# Patient Record
Sex: FEMALE | Race: WHITE | NOT HISPANIC OR LATINO | Employment: UNEMPLOYED | URBAN - METROPOLITAN AREA
[De-identification: names, ages, dates, MRNs, and addresses within clinical notes are randomized per-mention and may not be internally consistent; named-entity substitution may affect disease eponyms.]

---

## 2019-07-12 ENCOUNTER — OFFICE VISIT (OUTPATIENT)
Dept: ORTHOPEDIC SURGERY | Facility: CLINIC | Age: 54
End: 2019-07-12

## 2019-07-12 VITALS — WEIGHT: 187.83 LBS | HEART RATE: 86 BPM | BODY MASS INDEX: 34.57 KG/M2 | OXYGEN SATURATION: 96 % | HEIGHT: 62 IN

## 2019-07-12 DIAGNOSIS — M76.62 TENDONITIS, ACHILLES, LEFT: ICD-10-CM

## 2019-07-12 DIAGNOSIS — M76.61 RIGHT ACHILLES TENDINITIS: Primary | ICD-10-CM

## 2019-07-12 PROCEDURE — 99203 OFFICE O/P NEW LOW 30 MIN: CPT | Performed by: PHYSICIAN ASSISTANT

## 2019-07-12 NOTE — PROGRESS NOTES
[facility] Note    Subjective     Patient: Asmita Sousa  : 1965    Primary Care Provider: Opal Bowie MD    Requesting Provider: As above    Pain of the Left Foot and Pain of the Right Foot      History    Chief Complaint: biLateral Achilles pain    History of Present Illness: This is a very pleasant 53-year-old female presents today with bilateral Achilles tendon pain since May 2019.  The left is more painful than the right.  She denies any trauma or injury.  She rates the pain to be 4/10 in the left and 3/10 on the right.  She denies any radiating pain swelling warmth or erythema.  She is treated with shoe inserts, rest, icing without improve pain.  Pain is worse walking downstairs.  She is here for further evaluation and treatment recommendations.    No current outpatient medications on file prior to visit.     No current facility-administered medications on file prior to visit.       No Known Allergies   History reviewed. No pertinent past medical history.  Past Surgical History:   Procedure Laterality Date   • FOOT SURGERY      Bilateral Foot Spur Remove      Family History   Problem Relation Age of Onset   • Hypertension Mother    • Cancer Mother       Social History     Socioeconomic History   • Marital status:      Spouse name: Not on file   • Number of children: Not on file   • Years of education: Not on file   • Highest education level: Not on file   Tobacco Use   • Smoking status: Never Smoker   • Smokeless tobacco: Never Used   Substance and Sexual Activity   • Alcohol use: Yes     Comment: OCC   • Drug use: No   • Sexual activity: Defer        Review of Systems   Constitutional: Positive for activity change.   HENT: Negative.    Eyes: Negative.    Respiratory: Negative.    Cardiovascular: Negative.    Gastrointestinal: Negative.    Endocrine: Negative.    Genitourinary: Negative.    Musculoskeletal: Positive for back pain and gait problem.   Skin:  "Negative.    Allergic/Immunologic: Negative.    Hematological: Negative.    Psychiatric/Behavioral: Negative.        The following portions of the patient's history were reviewed and updated as appropriate: allergies, current medications, past family history, past medical history, past social history, past surgical history and problem list.      Objective      Physical Exam  Pulse 86   Ht 158 cm (62.21\")   Wt 85.2 kg (187 lb 13.3 oz)   SpO2 96%   Breastfeeding? No   BMI 34.13 kg/m²     Body mass index is 34.13 kg/m².    GENERAL: Body habitus: obese    Lower extremity edema: Left: trace; Right: trace    Varicose veins:  Left: none; Right: none    Gait: antalgic     Mental Status:  awake and alert; oriented to person, place, and time    Voice:  clear  SKIN:  Normal    Hair Growth:  Right:normal; Left:  normal  HEENT: Head: Normocephalic, atraumatic,  without obvious abnormality.  eye: normal external eye, no icterus   PULM:  Repiratory effort normal    Ortho Exam  V:  Dorsalis Pedis:  Right: 2+; Left:2+    Posterior Tibial: Right:2+; Left:2+    Capillary Refill:  Brisk  MSK:      Tibia:  Right:  non tender; Left:  non tender      Ankle:  Right: tender Over the insertion of the Achilles tendon, ROM  normal and motor function  normal; Left:  tender Over the insertion of the Achilles tendon, ROM  normal and motor function  normal      Foot:  Right:  non tender; Left:  non tender      NEURO: Heel Walking:  Right:  normal; Left:  normal    Toe Walking:  Right:  normal; Left:  normal     [place]-Wero 5.07 monofilament test: not evaluated    Lower extremity sensation: intact     Calf Atrophy:none    Motor Function: all 5/5          Medical Decision Making    Data Review:   none    Assessment:  1. Right Achilles tendinitis    2. Tendonitis, Achilles, left        Plan:  Insertional Achilles tendonitis, retrocalcaneal bursitis.  I have explained the problem to the patient in detail.  It is generally thought to be an " overuse syndrome or due to chronic aging change.  I explained that it is NOT due to a “spur”.  The osteophyte (“spur”) often visible on x-ray is not the source of the problem: Many, many people have the same x-ray finding and do not have Achilles pain.  The problem causing the pain is the chronic tendinitis, inflammation, wear and tear of the tendon where it goes into the bone.  I explained it is very common in overweight people over 40.  I explained that if you do an MRI on everyone with this problem we will see tears and inflammation in the tendon, and likely some edema in the bone, this does not change treatment.    The bump on the heel NOT go away, the goal of the treatment is to have pain resolve.    Literature shows it is best treated with a 12 week course of physical therapy and night splinting.  I have shown the patient the 2 stretches to do at home (in addition to what PT shows them), and recommend they do them 5 reps, 6-8 times per day.  I explained that injections and orthotics will not help.  I wrote a prescription for physical therapy, and we explained where to obtain a night splint..    Follow-up in 4 months if not improved.    If not improved, I explained the next step is a short leg walking cast for 6 weeks.    Surgery is a last resort as it is only helpful in about 60% of the time in improving the pain from this problem.    Patient is going to begin physical therapy and night splinting return for casting if needed        Mira Goode PA-C  07/16/19  10:56 AM

## 2019-07-30 ENCOUNTER — TREATMENT (OUTPATIENT)
Dept: PHYSICAL THERAPY | Facility: CLINIC | Age: 54
End: 2019-07-30

## 2019-07-30 DIAGNOSIS — M76.62 ACHILLES TENDINITIS OF BOTH LOWER EXTREMITIES: Primary | ICD-10-CM

## 2019-07-30 DIAGNOSIS — M76.61 ACHILLES TENDINITIS OF BOTH LOWER EXTREMITIES: Primary | ICD-10-CM

## 2019-07-30 PROCEDURE — 97110 THERAPEUTIC EXERCISES: CPT | Performed by: PHYSICAL THERAPIST

## 2019-07-30 PROCEDURE — 97161 PT EVAL LOW COMPLEX 20 MIN: CPT | Performed by: PHYSICAL THERAPIST

## 2019-07-30 NOTE — PROGRESS NOTES
Physical Therapy Initial Evaluation and Plan of Care      Patient: Asmita Sousa   : 1965  Diagnosis/ICD-10 Code:  Achilles tendinitis of both lower extremities [M76.61, M76.62]  Referring practitioner: EARL Higuera*    Subjective Evaluation    History of Present Illness  Mechanism of injury: Patient states on May 12th she started running and felt a ripping feeling on the back of left heel and had several days where she could hardly walk. Right one started hurting as well but left one is much worse.     Difficulty walking down steps and running. Bothers her when first getting up after sitting for a while. Feels like she is limping.     Has been wearing a night splint which has helped.     PMH: bone spurs removed >10 years ago bilaterally     Pain  Current pain ratin  At best pain ratin  At worst pain ratin  Location: bilateral posterior calcaneus   Quality: burning and throbbing  Aggravating factors: ambulation, standing and stairs    Patient Goals  Patient goals for therapy: decreased edema, decreased pain, improved balance, increased motion, return to sport/leisure activities, independence with ADLs/IADLs and increased strength  Patient goal: exercise including spin, running, step aerobics            Objective       Palpation   Left   No palpable tenderness to the soleus.   Tenderness of the lateral gastrocnemius.     Right   No palpable tenderness to the soleus. Tenderness of the lateral gastrocnemius.     Tenderness   Left Ankle/Foot   Tenderness in the Achilles insertion. No tenderness in the proximal Achilles.     Right Ankle/Foot   Tenderness in the Achilles insertion. No tenderness in the proximal Achilles.     Neurological Testing     Sensation     Ankle/Foot   Left Ankle/Foot   Intact: light touch  Diminished: proprioception    Right Ankle/Foot   Intact: light touch     Active Range of Motion   Left Ankle/Foot   Dorsiflexion (ke): 13 degrees   Plantar flexion:  WFL  Inversion: 42 degrees   Eversion: 20 degrees     Right Ankle/Foot   Dorsiflexion (ke): 11 degrees   Plantar flexion: WFL  Inversion: 30 degrees   Eversion: 15 degrees     Joint Play   Left Ankle/Foot  Joints within functional limits are the subtalar joint, midfoot and forefoot.     Right Ankle/Foot  Joints within functional limits are the subtalar joint, midfoot and forefoot.     Strength/Myotome Testing     Left Hip   Normal muscle strength    Right Hip   Normal muscle strength    Left Knee   Normal strength    Right Knee   Normal strength    Left Ankle/Foot   Dorsiflexion: 4  Plantar flexion: 4  Inversion: 4  Eversion: 4    Right Ankle/Foot   Dorsiflexion: 4  Plantar flexion: 4  Inversion: 4  Eversion: 4    Tests   Left Ankle/Foot   Positive for navicular drop.   Negative for calcaneal squeeze.     Right Ankle/Foot   Negative for calcaneal squeeze.     General Comments     Ankle/Foot Comments   No pain with toe walking, mild discomfort heel walking. No pain with repeated calf raise.          Assessment & Plan     Assessment  Impairments: abnormal coordination, abnormal gait, abnormal muscle firing, abnormal muscle tone, abnormal or restricted ROM, activity intolerance, impaired balance, impaired physical strength, lacks appropriate home exercise program and pain with function  Assessment details: Patient is a 53 year old female presenting with bilateral calcaneal pain beginning in May of this year. Signs and symptoms are consistent with chronic ankle instability, possible retrocalcaneal bursitis. Cannot rule out achilles tendonitis. Patient presents with achilles insertion pain without tendon pain, navicular drop notable on left, and general instability and ankle laxity. Patient is appropriate for physical therapy to address the above issues.   Prognosis: good  Prognosis details: Short Term Goals (3 weeks):  1. Patient will be independent with home exercise program.  2. Patient will demonstrate improved ankle  strength by 50%.    Long Term Goals (8 weeks):  1. Patient will be able to run/jog at least 20 minutes with ankle pain no greater than 2/10.  2. Patient will be bale to descend full flight of steps with ankle pain no greater than 2/10.   3. Patient will be able to return to full activity with ankle pain no greater than 2/10.    Functional Limitations: walking, uncomfortable because of pain and standing  Plan  Therapy options: will be seen for skilled physical therapy services  Planned modality interventions: ultrasound, traction, thermotherapy (hydrocollator packs), TENS, iontophoresis, high voltage pulsed current (spasm management), high voltage pulsed current (pain management), cryotherapy, contrast bath immersion and fluidotherapy  Planned therapy interventions: therapeutic activities, stretching, strengthening, spinal/joint mobilization, soft tissue mobilization, postural training, neuromuscular re-education, motor coordination training, manual therapy, abdominal trunk stabilization, ADL retraining, balance/weight-bearing training, body mechanics training, fine motor coordination training, flexibility, functional ROM exercises, gait training, home exercise program, IADL retraining and joint mobilization  Frequency: 2-3x/week.  Duration in visits: 16  Treatment plan discussed with: patient        Manual Therapy:         mins  51982;  Therapeutic Exercise:    41     mins  03345;     Neuromuscular Marcel:        mins  69188;    Therapeutic Activity:          mins  69493;     Gait Training:           mins  21195;     Ultrasound:          mins  56926;    Electrical Stimulation:         mins  51431 ( );  Dry Needling          mins self-pay    Timed Treatment:   41   mins   Total Treatment:     58   mins    PT SIGNATURE: Katt Alas, LOCO   DATE TREATMENT INITIATED: 7/31/2019    Initial Certification  Certification Period: 10/29/2019  I certify that the therapy services are furnished while this patient is under my  care.  The services outlined above are required by this patient, and will be reviewed every 90 days.     PHYSICIAN: Mira Goode PA-C      DATE:     Please sign and return via fax to [phone].. Thank you, [facility].

## 2019-08-01 ENCOUNTER — TREATMENT (OUTPATIENT)
Dept: PHYSICAL THERAPY | Facility: CLINIC | Age: 54
End: 2019-08-01

## 2019-08-01 DIAGNOSIS — M76.62 ACHILLES TENDINITIS OF BOTH LOWER EXTREMITIES: Primary | ICD-10-CM

## 2019-08-01 DIAGNOSIS — M76.61 ACHILLES TENDINITIS OF BOTH LOWER EXTREMITIES: Primary | ICD-10-CM

## 2019-08-01 PROCEDURE — 97035 APP MDLTY 1+ULTRASOUND EA 15: CPT | Performed by: PHYSICAL THERAPIST

## 2019-08-01 PROCEDURE — 97110 THERAPEUTIC EXERCISES: CPT | Performed by: PHYSICAL THERAPIST

## 2019-08-02 NOTE — PROGRESS NOTES
Subjective   Asmita Sousa reports: Soreness in lateral calcaneus following exercises which improved next day.     Objective   See Exercise, Manual, and Modality Logs for complete treatment.       Assessment/Plan  Patient tolerated exercises well. Trial with US over retrocalcaneal bursa area bilaterally.   Progress per Plan of Care           Manual Therapy:         mins  40414;  Therapeutic Exercise:    43     mins  50844;     Neuromuscular Marcel:        mins  03987;    Therapeutic Activity:          mins  57542;     Gait Training:           mins  05028;     Ultrasound:     15     mins  18264;   Iontophoresis          mins  98157   Electrical Stimulation:         mins  58547 ( );  Dry Needling          mins self-pay  Fluidotherapy          mins 15096    Timed Treatment:   58   mins   Total Treatment:     58   mins    Katt Alas PT  Physical Therapist

## 2019-08-05 ENCOUNTER — TREATMENT (OUTPATIENT)
Dept: PHYSICAL THERAPY | Facility: CLINIC | Age: 54
End: 2019-08-05

## 2019-08-05 PROCEDURE — 97530 THERAPEUTIC ACTIVITIES: CPT | Performed by: PHYSICAL THERAPIST

## 2019-08-05 PROCEDURE — 97110 THERAPEUTIC EXERCISES: CPT | Performed by: PHYSICAL THERAPIST

## 2019-08-05 PROCEDURE — 97035 APP MDLTY 1+ULTRASOUND EA 15: CPT | Performed by: PHYSICAL THERAPIST

## 2019-08-05 NOTE — PROGRESS NOTES
Subjective   Asmita Sousa reports: Hasn't been as stiff in the morning as usual. Has not been doing spin class. Yoga and water aerobics felt fine. Would like to get to a point where she can run 1 min: walk 2 min for 30 mins     Objective   Mild tenderness on left posterior calcaneus with lunges.     See Exercise, Manual, and Modality Logs for complete treatment.       Assessment/Plan  Will continue to work on stability and gastroc/solus strengthening as tolerated.   Progress per Plan of Care           Manual Therapy:         mins  55410;  Therapeutic Exercise:    35     mins  35710;     Neuromuscular Marcel:        mins  48331;    Therapeutic Activity:     15     mins  82000;     Gait Training:           mins  28059;     Ultrasound:     15     mins  17106;   Iontophoresis          mins  52155   Electrical Stimulation:         mins  23950 ( );  Dry Needling          mins self-pay  Fluidotherapy          mins 79995    Timed Treatment:   65   mins   Total Treatment:     65   mins    Katt Alas, PT  Physical Therapist

## 2019-08-07 ENCOUNTER — TREATMENT (OUTPATIENT)
Dept: PHYSICAL THERAPY | Facility: CLINIC | Age: 54
End: 2019-08-07

## 2019-08-07 DIAGNOSIS — M76.61 ACHILLES TENDINITIS OF BOTH LOWER EXTREMITIES: Primary | ICD-10-CM

## 2019-08-07 DIAGNOSIS — M76.62 ACHILLES TENDINITIS OF BOTH LOWER EXTREMITIES: Primary | ICD-10-CM

## 2019-08-07 PROCEDURE — 97530 THERAPEUTIC ACTIVITIES: CPT | Performed by: PHYSICAL THERAPIST

## 2019-08-07 PROCEDURE — 97110 THERAPEUTIC EXERCISES: CPT | Performed by: PHYSICAL THERAPIST

## 2019-08-07 PROCEDURE — 97035 APP MDLTY 1+ULTRASOUND EA 15: CPT | Performed by: PHYSICAL THERAPIST

## 2019-08-07 NOTE — PROGRESS NOTES
Subjective   Asmita Sousa reports: Soreness in posterior ankle and calf.    Objective   See Exercise, Manual, and Modality Logs for complete treatment.       Assessment/Plan  Patient tolerated exercises well. Will continue working on ankle stability and decreasing irritation of achilles tendon.   Progress per Plan of Care           Manual Therapy:         mins  89434;  Therapeutic Exercise:    34     mins  35052;     Neuromuscular Marcel:        mins  44441;    Therapeutic Activity:     15     mins  91128;     Gait Training:           mins  69808;     Ultrasound:     15     mins  88328;   Iontophoresis          mins  56462   Electrical Stimulation:         mins  28458 ( );  Dry Needling          mins self-pay  Fluidotherapy          mins 92339    Timed Treatment:   64   mins   Total Treatment:     64   mins    Katt Alas PT  Physical Therapist

## 2019-08-15 ENCOUNTER — TREATMENT (OUTPATIENT)
Dept: PHYSICAL THERAPY | Facility: CLINIC | Age: 54
End: 2019-08-15

## 2019-08-15 DIAGNOSIS — M76.61 ACHILLES TENDINITIS OF BOTH LOWER EXTREMITIES: Primary | ICD-10-CM

## 2019-08-15 DIAGNOSIS — M76.62 ACHILLES TENDINITIS OF BOTH LOWER EXTREMITIES: Primary | ICD-10-CM

## 2019-08-15 PROCEDURE — 97530 THERAPEUTIC ACTIVITIES: CPT | Performed by: PHYSICAL THERAPIST

## 2019-08-15 PROCEDURE — 97110 THERAPEUTIC EXERCISES: CPT | Performed by: PHYSICAL THERAPIST

## 2019-08-15 PROCEDURE — 97140 MANUAL THERAPY 1/> REGIONS: CPT | Performed by: PHYSICAL THERAPIST

## 2019-08-15 NOTE — PROGRESS NOTES
Subjective   Asmita Merry reports: One spot on lateral left heel. Has returned to spin with no increase in discomfort. Has not had any increase in discomfort in heel with yoga poses.     Objective   Tenderness in lateral calcaneus distal to achilles insertion     See Exercise, Manual, and Modality Logs for complete treatment.       Assessment/Plan  Trial with contrast bath. Will continue ankle and calf strengthening and conditioning.   Progress per Plan of Care           Manual Therapy:    8     mins  27864;  Therapeutic Exercise:    35     mins  08924;     Neuromuscular Marcel:        mins  14760;    Therapeutic Activity:     13     mins  67454;     Gait Training:           mins  87461;     Ultrasound:          mins  60310;   Iontophoresis          mins  13741   Electrical Stimulation:         mins  54562 ( );  Dry Needling          mins self-pay  Fluidotherapy          mins 56439  Contrast bath   10 mins     Timed Treatment:   66   mins   Total Treatment:     66   mins    Katt Alas, PT  Physical Therapist

## 2019-08-19 ENCOUNTER — TREATMENT (OUTPATIENT)
Dept: PHYSICAL THERAPY | Facility: CLINIC | Age: 54
End: 2019-08-19

## 2019-08-19 DIAGNOSIS — M76.62 ACHILLES TENDINITIS OF BOTH LOWER EXTREMITIES: Primary | ICD-10-CM

## 2019-08-19 DIAGNOSIS — M76.61 ACHILLES TENDINITIS OF BOTH LOWER EXTREMITIES: Primary | ICD-10-CM

## 2019-08-19 PROCEDURE — 97116 GAIT TRAINING THERAPY: CPT | Performed by: PHYSICAL THERAPIST

## 2019-08-19 PROCEDURE — 97110 THERAPEUTIC EXERCISES: CPT | Performed by: PHYSICAL THERAPIST

## 2019-08-19 PROCEDURE — 97530 THERAPEUTIC ACTIVITIES: CPT | Performed by: PHYSICAL THERAPIST

## 2019-08-19 PROCEDURE — 97034 APP MDLTY 1+CNTRST BTH EA 15: CPT | Performed by: PHYSICAL THERAPIST

## 2019-08-19 NOTE — PROGRESS NOTES
Subjective   Asmita Sousa reports: Has had no discomfort in heel with any activity over the weekend. Felt like the contrast bath was very helpful     Objective    Normal running gait pattern.     Able to hop SL on either foot with good control. Mildly decreased control on left foot vs right.     Able to tolerate walking lunges without discomfort in heels.     See Exercise, Manual, and Modality Logs for complete treatment.       Assessment/Plan  Patient had mild discomfort in left heel with high level activity with reduced with rest. Will assess later this week for response to increased activity. Patient responded well to contrast bath and advised to perform this modality as needed following increased activity.   Anticipate DC next Visit           Manual Therapy:         mins  63245;  Therapeutic Exercise:    23     mins  98190;     Neuromuscular Marcel:        mins  27388;    Therapeutic Activity:     15     mins  33512;     Gait Training:      15     mins  60405;     Ultrasound:          mins  75604;   Iontophoresis          mins  62845   Electrical Stimulation:         mins  23442 ( );  Dry Needling          mins self-pay  Fluidotherapy         mins 38074  Contrast bath   10 mins       Timed Treatment:  63  mins   Total Treatment:     63   mins    Katt Alas PT  Physical Therapist

## 2019-08-22 ENCOUNTER — TREATMENT (OUTPATIENT)
Dept: PHYSICAL THERAPY | Facility: CLINIC | Age: 54
End: 2019-08-22

## 2019-08-22 DIAGNOSIS — M76.62 ACHILLES TENDINITIS OF BOTH LOWER EXTREMITIES: Primary | ICD-10-CM

## 2019-08-22 DIAGNOSIS — M76.61 ACHILLES TENDINITIS OF BOTH LOWER EXTREMITIES: Primary | ICD-10-CM

## 2019-08-22 PROCEDURE — 97110 THERAPEUTIC EXERCISES: CPT | Performed by: PHYSICAL THERAPIST

## 2019-08-22 PROCEDURE — 97116 GAIT TRAINING THERAPY: CPT | Performed by: PHYSICAL THERAPIST

## 2019-08-22 PROCEDURE — 97530 THERAPEUTIC ACTIVITIES: CPT | Performed by: PHYSICAL THERAPIST

## 2019-08-22 NOTE — PROGRESS NOTES
Subjective   Asmita Sousa reports: Had increased pain evening and day after run/walk on treadmill. No pain today.     Objective   Gross bilateral ankle strength: WFL    Gross bilateral ankle mobility: WFL    SLS on unstable surface >30 sec bilateral    Mild discomfort in left lateral calcaneous with walking lunges.     See Exercise, Manual, and Modality Logs for complete treatment.       Assessment/Plan  Patient demonstrates improved ankle strength and able to perform most activities without increased pain. Did have increased pain with running. Patient is interested in continuing with HEP at this time. Discussed progressive walking program. Patient educated on contrast bath and taping techniques as needed.   Other  Hold therapy for 2-3 weeks. Patient will continue with HEP and return if progress not made or regression.          Manual Therapy:         mins  85235;  Therapeutic Exercise:    25     mins  47250;     Neuromuscular Marcel:        mins  50885;    Therapeutic Activity:     14     mins  55926;     Gait Trainin     mins  98174;     Ultrasound:          mins  22347;   Iontophoresis          mins  70104   Electrical Stimulation:         mins  51048 ( );  Dry Needling          mins self-pay  Fluidotherapy          mins 87616  Contrast bath   10 mins     Timed Treatment:   62   mins   Total Treatment:     62   mins    Katt Alas, LOCO  Physical Therapist

## 2019-09-09 ENCOUNTER — OFFICE VISIT (OUTPATIENT)
Dept: FAMILY MEDICINE CLINIC | Facility: CLINIC | Age: 54
End: 2019-09-09

## 2019-09-09 VITALS
TEMPERATURE: 98.2 F | WEIGHT: 192 LBS | RESPIRATION RATE: 18 BRPM | HEART RATE: 59 BPM | BODY MASS INDEX: 35.33 KG/M2 | HEIGHT: 62 IN | OXYGEN SATURATION: 97 % | SYSTOLIC BLOOD PRESSURE: 124 MMHG | DIASTOLIC BLOOD PRESSURE: 92 MMHG

## 2019-09-09 DIAGNOSIS — E66.9 OBESITY (BMI 30.0-34.9): ICD-10-CM

## 2019-09-09 DIAGNOSIS — M79.10 MYALGIA: ICD-10-CM

## 2019-09-09 DIAGNOSIS — Z00.00 HEALTH CARE MAINTENANCE: Primary | ICD-10-CM

## 2019-09-09 LAB
25(OH)D3 SERPL-MCNC: 27.1 NG/ML (ref 30–100)
ALBUMIN SERPL-MCNC: 5 G/DL (ref 3.5–5.2)
ALBUMIN/GLOB SERPL: 1.9 G/DL
ALP SERPL-CCNC: 66 U/L (ref 39–117)
ALT SERPL W P-5'-P-CCNC: 30 U/L (ref 1–33)
ANION GAP SERPL CALCULATED.3IONS-SCNC: 14.1 MMOL/L (ref 5–15)
AST SERPL-CCNC: 30 U/L (ref 1–32)
BASOPHILS # BLD AUTO: 0.04 10*3/MM3 (ref 0–0.2)
BASOPHILS NFR BLD AUTO: 0.8 % (ref 0–1.5)
BILIRUB SERPL-MCNC: 0.5 MG/DL (ref 0.2–1.2)
BUN BLD-MCNC: 10 MG/DL (ref 6–20)
BUN/CREAT SERPL: 17.2 (ref 7–25)
CALCIUM SPEC-SCNC: 9.7 MG/DL (ref 8.6–10.5)
CHLORIDE SERPL-SCNC: 102 MMOL/L (ref 98–107)
CHOLEST SERPL-MCNC: 231 MG/DL (ref 0–200)
CHROMATIN AB SERPL-ACNC: <10 IU/ML (ref 0–14)
CK SERPL-CCNC: 299 U/L (ref 20–180)
CO2 SERPL-SCNC: 22.9 MMOL/L (ref 22–29)
CREAT BLD-MCNC: 0.58 MG/DL (ref 0.57–1)
CRP SERPL-MCNC: 0.26 MG/DL (ref 0–0.5)
DEPRECATED RDW RBC AUTO: 45.6 FL (ref 37–54)
EOSINOPHIL # BLD AUTO: 0.12 10*3/MM3 (ref 0–0.4)
EOSINOPHIL NFR BLD AUTO: 2.4 % (ref 0.3–6.2)
ERYTHROCYTE [DISTWIDTH] IN BLOOD BY AUTOMATED COUNT: 14.1 % (ref 12.3–15.4)
ERYTHROCYTE [SEDIMENTATION RATE] IN BLOOD: 5 MM/HR (ref 0–30)
GFR SERPL CREATININE-BSD FRML MDRD: 109 ML/MIN/1.73
GLOBULIN UR ELPH-MCNC: 2.6 GM/DL
GLUCOSE BLD-MCNC: 88 MG/DL (ref 65–99)
HCT VFR BLD AUTO: 45.8 % (ref 34–46.6)
HDLC SERPL-MCNC: 58 MG/DL (ref 40–60)
HGB BLD-MCNC: 14.5 G/DL (ref 12–15.9)
IMM GRANULOCYTES # BLD AUTO: 0.03 10*3/MM3 (ref 0–0.05)
IMM GRANULOCYTES NFR BLD AUTO: 0.6 % (ref 0–0.5)
IRON 24H UR-MRATE: 83 MCG/DL (ref 37–145)
LDLC SERPL CALC-MCNC: 142 MG/DL (ref 0–100)
LDLC/HDLC SERPL: 2.46 {RATIO}
LYMPHOCYTES # BLD AUTO: 1.67 10*3/MM3 (ref 0.7–3.1)
LYMPHOCYTES NFR BLD AUTO: 34.1 % (ref 19.6–45.3)
MCH RBC QN AUTO: 28.3 PG (ref 26.6–33)
MCHC RBC AUTO-ENTMCNC: 31.7 G/DL (ref 31.5–35.7)
MCV RBC AUTO: 89.5 FL (ref 79–97)
MONOCYTES # BLD AUTO: 0.31 10*3/MM3 (ref 0.1–0.9)
MONOCYTES NFR BLD AUTO: 6.3 % (ref 5–12)
NEUTROPHILS # BLD AUTO: 2.73 10*3/MM3 (ref 1.7–7)
NEUTROPHILS NFR BLD AUTO: 55.8 % (ref 42.7–76)
NRBC BLD AUTO-RTO: 0 /100 WBC (ref 0–0.2)
PLATELET # BLD AUTO: 275 10*3/MM3 (ref 140–450)
PMV BLD AUTO: 10.8 FL (ref 6–12)
POTASSIUM BLD-SCNC: 3.9 MMOL/L (ref 3.5–5.2)
PROT SERPL-MCNC: 7.6 G/DL (ref 6–8.5)
RBC # BLD AUTO: 5.12 10*6/MM3 (ref 3.77–5.28)
SODIUM BLD-SCNC: 139 MMOL/L (ref 136–145)
TRIGL SERPL-MCNC: 153 MG/DL (ref 0–150)
TSH SERPL DL<=0.05 MIU/L-ACNC: 1.75 UIU/ML (ref 0.27–4.2)
VIT B12 BLD-MCNC: <150 PG/ML (ref 211–946)
VLDLC SERPL-MCNC: 30.6 MG/DL (ref 5–40)
WBC NRBC COR # BLD: 4.9 10*3/MM3 (ref 3.4–10.8)

## 2019-09-09 PROCEDURE — 80061 LIPID PANEL: CPT | Performed by: FAMILY MEDICINE

## 2019-09-09 PROCEDURE — 86038 ANTINUCLEAR ANTIBODIES: CPT | Performed by: FAMILY MEDICINE

## 2019-09-09 PROCEDURE — 86140 C-REACTIVE PROTEIN: CPT | Performed by: FAMILY MEDICINE

## 2019-09-09 PROCEDURE — 84443 ASSAY THYROID STIM HORMONE: CPT | Performed by: FAMILY MEDICINE

## 2019-09-09 PROCEDURE — 80053 COMPREHEN METABOLIC PANEL: CPT | Performed by: FAMILY MEDICINE

## 2019-09-09 PROCEDURE — 83525 ASSAY OF INSULIN: CPT | Performed by: FAMILY MEDICINE

## 2019-09-09 PROCEDURE — 99386 PREV VISIT NEW AGE 40-64: CPT | Performed by: FAMILY MEDICINE

## 2019-09-09 PROCEDURE — 99212 OFFICE O/P EST SF 10 MIN: CPT | Performed by: FAMILY MEDICINE

## 2019-09-09 PROCEDURE — 82607 VITAMIN B-12: CPT | Performed by: FAMILY MEDICINE

## 2019-09-09 PROCEDURE — 83540 ASSAY OF IRON: CPT | Performed by: FAMILY MEDICINE

## 2019-09-09 PROCEDURE — 86431 RHEUMATOID FACTOR QUANT: CPT | Performed by: FAMILY MEDICINE

## 2019-09-09 PROCEDURE — 85652 RBC SED RATE AUTOMATED: CPT | Performed by: FAMILY MEDICINE

## 2019-09-09 PROCEDURE — 82306 VITAMIN D 25 HYDROXY: CPT | Performed by: FAMILY MEDICINE

## 2019-09-09 PROCEDURE — 85025 COMPLETE CBC W/AUTO DIFF WBC: CPT | Performed by: FAMILY MEDICINE

## 2019-09-09 PROCEDURE — 82550 ASSAY OF CK (CPK): CPT | Performed by: FAMILY MEDICINE

## 2019-09-09 NOTE — PROGRESS NOTES
"Subjective   Asmita Sousa is a 53 y.o. female.     History of Present Illness     The following portions of the patient's history were reviewed and updated as appropriate: {history reviewed:20406::\"allergies\",\"current medications\",\"past family history\",\"past medical history\",\"past social history\",\"past surgical history\",\"problem list\"}.  Vitals:    09/09/19 1000   BP: 124/92   Pulse: 59   Resp: 18   Temp: 98.2 °F (36.8 °C)   SpO2: 97%     Body mass index is 35.12 kg/m².  Review of Systems    Objective   Physical Exam        Assessment/Plan   {Assess/PlanSmartLinks:25825}           "

## 2019-09-09 NOTE — PROGRESS NOTES
"Elian Sousa is a 53 y.o. female and is here for a comprehensive physical exam. The patient reports multiple concerns.   Establish care- used to be seen at [facility]; last labs 3-4 months ago  1. Pt concerned about weight. Exercises regularly.  2. Has sleep issues and not sleeping well.   3. Has had increased muscle pain worse with exercise.    Last health maintenance visit was 1 year . Overall they feel their health is good . Lives with spouse  Occupation is unemployed. Patient's diet is in general, a \"healthy\" diet  . Has been on a vegan diet. Exercises regularly regularly 5 times weekly cardio at [facility]. Tobacco use Never used. Alcohol use is social drinker . Illicit drug no history of illicit drug use    Screening Tests  Vision Impairment. Uses Glasses/Contacts   Hearingnormal  Dental: Brushes does teeth twice a day . Dental exam every six months?yes  Mammogram up to date yes  Pap smear yes  Colonoscopy no  Dexa Scan yes    Do you take any herbs or supplements that were not prescribed by a doctor? no  Are you taking calcium supplements? no  Are you taking aspirin daily? no  Family history of ovarian cancer? no  Family history of breast cancer? Yes mother and grandmother  FH of endometrial cancer? no  FH of cervical cancer? no  FH of colon cancer? no       History:  LMP: No LMP recorded. Patient is postmenopausal.  Menopause at 51 years  Last pap date: 2019  Abnormal pap? no  : 2  Para: 2    Immunization History  Tdap? no  HPV? not applicable  Pneumonia? not applicable  Shingles? not applicable    The following portions of the patient's history were reviewed and updated as appropriate: allergies, current medications, past family history, past medical history, past social history, past surgical history and problem list.    Past Medical History:   Diagnosis Date   • Irritable bowel syndrome        Family History   Problem Relation Age of Onset   • Hypertension Mother    • Cancer Mother  "       Past Surgical History:   Procedure Laterality Date   • FOOT SURGERY      Bilateral Foot Spur Remove        Social History     Socioeconomic History   • Marital status:      Spouse name: Not on file   • Number of children: Not on file   • Years of education: Not on file   • Highest education level: Not on file   Tobacco Use   • Smoking status: Never Smoker   • Smokeless tobacco: Never Used   Substance and Sexual Activity   • Alcohol use: Yes     Comment: OCC   • Drug use: No   • Sexual activity: Defer       Review of Systems  Do you have pain that bothers you in your daily life? yes  Review of Systems   Constitutional: Negative.  Negative for activity change, fatigue, fever and unexpected weight change.   HENT: Negative.  Negative for congestion, sneezing and sore throat.    Eyes: Negative.  Negative for visual disturbance.   Respiratory: Negative.  Negative for cough, chest tightness, shortness of breath and wheezing.    Cardiovascular: Negative.  Negative for chest pain, palpitations and leg swelling.   Gastrointestinal: Negative.  Negative for abdominal distention, abdominal pain, blood in stool, constipation, diarrhea and nausea.   Endocrine: Negative.  Negative for cold intolerance and heat intolerance.   Genitourinary: Negative.  Negative for dysuria, frequency and urgency.   Musculoskeletal: Positive for myalgias. Negative for arthralgias.   Skin: Negative.  Negative for rash.   Allergic/Immunologic: Negative.    Neurological: Negative.  Negative for dizziness, syncope and numbness.   Hematological: Negative.  Negative for adenopathy.   Psychiatric/Behavioral: Positive for sleep disturbance. Negative for suicidal ideas. The patient is not nervous/anxious.        Objective   Physical Exam   Constitutional: She is oriented to person, place, and time. She appears well-developed and well-nourished. No distress.   HENT:   Right Ear: External ear normal.   Left Ear: External ear normal.   Nose: Nose  normal.   Mouth/Throat: Oropharynx is clear and moist.   Eyes: Conjunctivae and EOM are normal. Pupils are equal, round, and reactive to light.   Neck: Normal range of motion. Neck supple. No thyromegaly present.   Cardiovascular: Normal rate, regular rhythm and normal heart sounds.   No murmur heard.  Pulmonary/Chest: Effort normal and breath sounds normal. No respiratory distress. She has no wheezes.   Abdominal: Soft. Bowel sounds are normal. She exhibits no distension and no mass. There is no tenderness. There is no rebound and no guarding. No hernia.   Musculoskeletal: Normal range of motion. She exhibits no edema or tenderness.   Lymphadenopathy:     She has no cervical adenopathy.   Neurological: She is alert and oriented to person, place, and time. She has normal reflexes.   Skin: Skin is warm and dry. No rash noted. She is not diaphoretic. No erythema. No pallor.   Psychiatric: She has a normal mood and affect. Her behavior is normal. Judgment and thought content normal.   Nursing note and vitals reviewed.       Diagnoses and all orders for this visit:    Health care maintenance  -     Ambulatory Referral For Screening Colonoscopy  -     CBC & Differential  -     Comprehensive Metabolic Panel  -     Lipid Panel  -     Iron  -     TSH  -     C-reactive Protein  -     Vitamin D 25 Hydroxy  -     Vitamin B12  -     Insulin, Total  -     CBC Auto Differential    Obesity (BMI 30.0-34.9)    Myalgia  -     CK  -     SIRENA  -     Rheumatoid Factor  -     Sedimentation Rate    Other orders  -     Prasterone (INTRAROSA) 6.5 MG insert; Intrarosa 6.5 mg vaginal insert   Insert 1 vaginal insert every day by vaginal route for 28 days.         Patient Counseling:   --BMI: Discussed that it is not acceptable and appropriate                 Plan.  --Nutrition: Stressed importance of moderation in sodium/caffeine intake, saturated fat and cholesterol, caloric balance, sufficient intake of fresh fruits, vegetables, fiber,  calcium, iron, and 1 mg of folate supplement per day (for females capable of pregnancy).  ---Exercise: Stressed the importance of regular exercise.   --Substance Abuse: Discussed cessation/primary prevention of tobacco, alcohol, or other drug use; driving or other dangerous activities under the influence; availability of treatment for abuse.    --Sexuality: Discussed sexually transmitted diseases, partner selection, use of condoms, avoidance of unintended pregnancy  and contraceptive alternatives.   --Injury prevention: Discussed safety belts, safety helmets, smoke detector, smoking near bedding or upholstery.   --Dental health: Discussed importance of regular tooth brushing, flossing, and dental visits.  --Immunizations reviewed.  --Discussed benefits of mammogram  --Discussed benefits of screening colonoscopy.  --After hours service discussed with patient    Discussed the nature of the disease including, risks, complications, implications, management, safe and proper use of medications. Encouraged therapeutic lifestyle changes including healthy diet with plenty of fruits and vegetables, daily exercise, medication compliance, and keeping scheduled follow up appointments with me and any other providers. Encouraged patient to have appointment for complete physical, fasting labs, appropriate screenings, and immunizations on an annual basis.  Discussed with patient counseling on nutrition. Discussed in detail recommendations regarding decreasing animal fat and dairy and increasing fruits and vegetables. Discussed cutting refined sugars and white breads. Recommend beans, legumes, whole grains, nuts and seeds. Recommend cutting all processed foods. Given handouts from physicians committee on responsible medicine.        Follow up as needed for acute illness

## 2019-09-10 ENCOUNTER — TELEPHONE (OUTPATIENT)
Dept: FAMILY MEDICINE CLINIC | Facility: CLINIC | Age: 54
End: 2019-09-10

## 2019-09-10 LAB
ANA SER QL: NEGATIVE
INSULIN SERPL-ACNC: 17.9 UIU/ML (ref 2.6–24.9)

## 2019-09-10 NOTE — TELEPHONE ENCOUNTER
Contacted pt and advised per richar pt needs to start b12 injections monthly. Pt voiced understanding.

## 2019-09-12 ENCOUNTER — CLINICAL SUPPORT (OUTPATIENT)
Dept: FAMILY MEDICINE CLINIC | Facility: CLINIC | Age: 54
End: 2019-09-12

## 2019-09-12 DIAGNOSIS — E53.8 B12 DEFICIENCY: Primary | ICD-10-CM

## 2019-09-12 PROCEDURE — 96372 THER/PROPH/DIAG INJ SC/IM: CPT | Performed by: FAMILY MEDICINE

## 2019-09-12 RX ORDER — CYANOCOBALAMIN 1000 UG/ML
1000 INJECTION, SOLUTION INTRAMUSCULAR; SUBCUTANEOUS
Status: SHIPPED | OUTPATIENT
Start: 2019-09-12

## 2019-09-12 RX ADMIN — CYANOCOBALAMIN 1000 MCG: 1000 INJECTION, SOLUTION INTRAMUSCULAR; SUBCUTANEOUS at 11:09

## 2019-10-14 ENCOUNTER — CLINICAL SUPPORT (OUTPATIENT)
Dept: FAMILY MEDICINE CLINIC | Facility: CLINIC | Age: 54
End: 2019-10-14

## 2019-10-14 DIAGNOSIS — E53.8 VITAMIN B 12 DEFICIENCY: ICD-10-CM

## 2019-10-14 PROCEDURE — 96372 THER/PROPH/DIAG INJ SC/IM: CPT | Performed by: FAMILY MEDICINE

## 2019-10-14 RX ADMIN — CYANOCOBALAMIN 1000 MCG: 1000 INJECTION, SOLUTION INTRAMUSCULAR; SUBCUTANEOUS at 11:28

## 2019-10-16 ENCOUNTER — OFFICE VISIT (OUTPATIENT)
Dept: FAMILY MEDICINE CLINIC | Facility: CLINIC | Age: 54
End: 2019-10-16

## 2019-10-16 VITALS
WEIGHT: 187.8 LBS | DIASTOLIC BLOOD PRESSURE: 80 MMHG | SYSTOLIC BLOOD PRESSURE: 116 MMHG | TEMPERATURE: 96.8 F | HEART RATE: 94 BPM | BODY MASS INDEX: 34.56 KG/M2 | RESPIRATION RATE: 20 BRPM | HEIGHT: 62 IN | OXYGEN SATURATION: 97 %

## 2019-10-16 DIAGNOSIS — J01.00 ACUTE NON-RECURRENT MAXILLARY SINUSITIS: Primary | ICD-10-CM

## 2019-10-16 DIAGNOSIS — H65.193 ACUTE EFFUSION OF BOTH MIDDLE EARS: ICD-10-CM

## 2019-10-16 PROCEDURE — 99213 OFFICE O/P EST LOW 20 MIN: CPT | Performed by: NURSE PRACTITIONER

## 2019-10-16 PROCEDURE — 96372 THER/PROPH/DIAG INJ SC/IM: CPT | Performed by: NURSE PRACTITIONER

## 2019-10-16 RX ORDER — CEFDINIR 300 MG/1
300 CAPSULE ORAL 2 TIMES DAILY
Qty: 20 CAPSULE | Refills: 0 | Status: SHIPPED | OUTPATIENT
Start: 2019-10-16 | End: 2019-10-26

## 2019-10-16 RX ORDER — METHYLPREDNISOLONE 4 MG/1
TABLET ORAL
Qty: 1 EACH | Refills: 0 | Status: SHIPPED | OUTPATIENT
Start: 2019-10-16 | End: 2019-11-13

## 2019-10-16 RX ORDER — CEFDINIR 300 MG/1
300 CAPSULE ORAL 2 TIMES DAILY
Qty: 20 CAPSULE | Refills: 0 | Status: SHIPPED | OUTPATIENT
Start: 2019-10-16 | End: 2019-10-16 | Stop reason: SDUPTHER

## 2019-10-16 RX ORDER — METHYLPREDNISOLONE 4 MG/1
TABLET ORAL
Qty: 1 EACH | Refills: 0 | Status: SHIPPED | OUTPATIENT
Start: 2019-10-16 | End: 2019-10-16 | Stop reason: SDUPTHER

## 2019-10-16 RX ORDER — DEXAMETHASONE SODIUM PHOSPHATE 4 MG/ML
4 INJECTION, SOLUTION INTRA-ARTICULAR; INTRALESIONAL; INTRAMUSCULAR; INTRAVENOUS; SOFT TISSUE ONCE
Status: COMPLETED | OUTPATIENT
Start: 2019-10-16 | End: 2019-10-16

## 2019-10-16 RX ADMIN — DEXAMETHASONE SODIUM PHOSPHATE 4 MG: 4 INJECTION, SOLUTION INTRA-ARTICULAR; INTRALESIONAL; INTRAMUSCULAR; INTRAVENOUS; SOFT TISSUE at 16:01

## 2019-11-13 ENCOUNTER — OFFICE VISIT (OUTPATIENT)
Dept: FAMILY MEDICINE CLINIC | Facility: CLINIC | Age: 54
End: 2019-11-13

## 2019-11-13 VITALS
DIASTOLIC BLOOD PRESSURE: 78 MMHG | TEMPERATURE: 98.5 F | BODY MASS INDEX: 34.37 KG/M2 | HEART RATE: 65 BPM | RESPIRATION RATE: 18 BRPM | SYSTOLIC BLOOD PRESSURE: 118 MMHG | OXYGEN SATURATION: 98 % | HEIGHT: 62 IN | WEIGHT: 186.8 LBS

## 2019-11-13 DIAGNOSIS — E66.9 OBESITY (BMI 30-39.9): Primary | ICD-10-CM

## 2019-11-13 DIAGNOSIS — E53.8 B12 DEFICIENCY: ICD-10-CM

## 2019-11-13 PROCEDURE — 99213 OFFICE O/P EST LOW 20 MIN: CPT | Performed by: FAMILY MEDICINE

## 2019-11-13 PROCEDURE — 90471 IMMUNIZATION ADMIN: CPT | Performed by: FAMILY MEDICINE

## 2019-11-13 PROCEDURE — 96372 THER/PROPH/DIAG INJ SC/IM: CPT | Performed by: FAMILY MEDICINE

## 2019-11-13 PROCEDURE — 90674 CCIIV4 VAC NO PRSV 0.5 ML IM: CPT | Performed by: FAMILY MEDICINE

## 2019-11-13 RX ORDER — CYANOCOBALAMIN 1000 UG/ML
1000 INJECTION, SOLUTION INTRAMUSCULAR; SUBCUTANEOUS
Status: SHIPPED | OUTPATIENT
Start: 2019-11-13

## 2019-11-13 RX ADMIN — CYANOCOBALAMIN 1000 MCG: 1000 INJECTION, SOLUTION INTRAMUSCULAR; SUBCUTANEOUS at 12:27

## 2019-11-13 NOTE — PROGRESS NOTES
Subjective   Asmita Sousa is a 53 y.o. female.     History of Present Illness   Follow up labs. Continues to lose weight and exercise. Has been plant based for 1 year. Concerned that her weight loss is slow.  Due for a B12 injection today.  The following portions of the patient's history were reviewed and updated as appropriate: allergies, current medications, past family history, past medical history, past social history, past surgical history and problem list.  Vitals:    11/13/19 1140   BP: 118/78   Pulse: 65   Resp: 18   Temp: 98.5 °F (36.9 °C)   SpO2: 98%     Body mass index is 34.17 kg/m².  Review of Systems   Constitutional: Negative.  Negative for activity change, fatigue, fever, unexpected weight gain and unexpected weight loss.   HENT: Negative.  Negative for congestion, sneezing and sore throat.    Eyes: Negative.  Negative for blurred vision, double vision and visual disturbance.   Respiratory: Negative.  Negative for cough, chest tightness, shortness of breath and wheezing.    Cardiovascular: Negative.  Negative for chest pain, palpitations and leg swelling.   Gastrointestinal: Negative.  Negative for abdominal distention, abdominal pain, blood in stool, constipation, diarrhea and nausea.   Endocrine: Negative.  Negative for cold intolerance and heat intolerance.   Genitourinary: Negative.  Negative for dysuria, frequency, urgency and urinary incontinence.   Musculoskeletal: Negative.  Negative for arthralgias and myalgias.   Skin: Negative.  Negative for rash.   Allergic/Immunologic: Negative.    Neurological: Negative.  Negative for dizziness, syncope, numbness and memory problem.   Hematological: Negative.  Negative for adenopathy.   Psychiatric/Behavioral: Negative.  Negative for suicidal ideas and depressed mood. The patient is not nervous/anxious.    All other systems reviewed and are negative.      Objective   Physical Exam   Constitutional: She is oriented to person, place, and time. She  appears well-developed and well-nourished. No distress.   HENT:   Right Ear: External ear normal.   Left Ear: External ear normal.   Nose: Nose normal.   Mouth/Throat: Oropharynx is clear and moist.   Eyes: Conjunctivae and EOM are normal. Pupils are equal, round, and reactive to light.   Neck: Normal range of motion. Neck supple. No thyromegaly present.   Cardiovascular: Normal rate, regular rhythm and normal heart sounds.   No murmur heard.  Pulmonary/Chest: Effort normal and breath sounds normal. No respiratory distress. She has no wheezes.   Abdominal: Soft. Bowel sounds are normal. She exhibits no distension and no mass. There is no tenderness. There is no rebound and no guarding. No hernia.   Musculoskeletal: Normal range of motion. She exhibits no edema or tenderness.   Lymphadenopathy:     She has no cervical adenopathy.   Neurological: She is alert and oriented to person, place, and time. She has normal reflexes.   Skin: Skin is warm and dry. No rash noted. She is not diaphoretic. No erythema. No pallor.   Psychiatric: She has a normal mood and affect. Her behavior is normal. Judgment and thought content normal.   Nursing note and vitals reviewed.          Assessment/Plan   Asmita was seen today for weight check and ear fullness.    Diagnoses and all orders for this visit:    Obesity (BMI 30-39.9)    B12 deficiency  -     cyanocobalamin injection 1,000 mcg    Other orders  -     Flucelvax Quad=>4Years ([id number])

## 2019-12-13 ENCOUNTER — CLINICAL SUPPORT (OUTPATIENT)
Dept: FAMILY MEDICINE CLINIC | Facility: CLINIC | Age: 54
End: 2019-12-13

## 2019-12-13 PROCEDURE — 96372 THER/PROPH/DIAG INJ SC/IM: CPT | Performed by: FAMILY MEDICINE

## 2019-12-13 RX ADMIN — CYANOCOBALAMIN 1000 MCG: 1000 INJECTION, SOLUTION INTRAMUSCULAR; SUBCUTANEOUS at 14:21

## 2020-01-07 ENCOUNTER — TELEPHONE (OUTPATIENT)
Dept: FAMILY MEDICINE CLINIC | Facility: CLINIC | Age: 55
End: 2020-01-07

## 2020-01-07 NOTE — TELEPHONE ENCOUNTER
Patient is calling wanting to figure out when she last had her B-12 shot, so she doesn't come in to early. Please advise and call back  Patient can be reached

## 2020-01-07 NOTE — TELEPHONE ENCOUNTER
Contacted pt and advised she had her last b12 injection on 12/13/19 and is due again 28 days after this date so that would be 01/10/20. Pt voiced understanding.

## 2020-01-10 ENCOUNTER — CLINICAL SUPPORT (OUTPATIENT)
Dept: FAMILY MEDICINE CLINIC | Facility: CLINIC | Age: 55
End: 2020-01-10

## 2020-01-10 PROCEDURE — 96372 THER/PROPH/DIAG INJ SC/IM: CPT | Performed by: FAMILY MEDICINE

## 2020-01-10 RX ADMIN — CYANOCOBALAMIN 1000 MCG: 1000 INJECTION, SOLUTION INTRAMUSCULAR; SUBCUTANEOUS at 12:42

## 2020-01-19 ENCOUNTER — OFFICE VISIT (OUTPATIENT)
Dept: FAMILY MEDICINE CLINIC | Facility: CLINIC | Age: 55
End: 2020-01-19

## 2020-01-19 VITALS
SYSTOLIC BLOOD PRESSURE: 128 MMHG | RESPIRATION RATE: 18 BRPM | HEIGHT: 62 IN | TEMPERATURE: 97.2 F | HEART RATE: 82 BPM | OXYGEN SATURATION: 98 % | BODY MASS INDEX: 36.44 KG/M2 | DIASTOLIC BLOOD PRESSURE: 70 MMHG | WEIGHT: 198 LBS

## 2020-01-19 DIAGNOSIS — K57.92 DIVERTICULITIS: ICD-10-CM

## 2020-01-19 DIAGNOSIS — R10.32 LEFT LOWER QUADRANT PAIN: Primary | ICD-10-CM

## 2020-01-19 LAB
ALBUMIN SERPL-MCNC: 4.4 G/DL (ref 3.5–5.2)
ALBUMIN/GLOB SERPL: 1.8 G/DL
ALP SERPL-CCNC: 57 U/L (ref 39–117)
ALT SERPL W P-5'-P-CCNC: 21 U/L (ref 1–33)
AMYLASE SERPL-CCNC: 52 U/L (ref 28–100)
ANION GAP SERPL CALCULATED.3IONS-SCNC: 13.9 MMOL/L (ref 5–15)
AST SERPL-CCNC: 20 U/L (ref 1–32)
BILIRUB SERPL-MCNC: 0.2 MG/DL (ref 0.2–1.2)
BUN BLD-MCNC: 11 MG/DL (ref 6–20)
BUN/CREAT SERPL: 19.6 (ref 7–25)
CALCIUM SPEC-SCNC: 9.2 MG/DL (ref 8.6–10.5)
CHLORIDE SERPL-SCNC: 104 MMOL/L (ref 98–107)
CO2 SERPL-SCNC: 24.1 MMOL/L (ref 22–29)
CREAT BLD-MCNC: 0.56 MG/DL (ref 0.57–1)
GFR SERPL CREATININE-BSD FRML MDRD: 113 ML/MIN/1.73
GLOBULIN UR ELPH-MCNC: 2.4 GM/DL
GLUCOSE BLD-MCNC: 119 MG/DL (ref 65–99)
HCT VFR BLDA CALC: 42.4 % (ref 38–51)
HGB BLDA-MCNC: 13.6 G/DL (ref 12–17)
LIPASE SERPL-CCNC: 42 U/L (ref 13–60)
MCH, POC: 28.7
MCHC, POC: 32.1
MCV, POC: 89.4
PLATELET # BLD AUTO: 271 10*3/MM3
PMV BLD: 7.7 FL
POTASSIUM BLD-SCNC: 4.2 MMOL/L (ref 3.5–5.2)
PROT SERPL-MCNC: 6.8 G/DL (ref 6–8.5)
RBC, POC: 4.74
RDW, POC: 13.2
SODIUM BLD-SCNC: 142 MMOL/L (ref 136–145)
WBC # BLD: 6 10*3/UL

## 2020-01-19 PROCEDURE — 36415 COLL VENOUS BLD VENIPUNCTURE: CPT | Performed by: NURSE PRACTITIONER

## 2020-01-19 PROCEDURE — 82150 ASSAY OF AMYLASE: CPT | Performed by: NURSE PRACTITIONER

## 2020-01-19 PROCEDURE — 83690 ASSAY OF LIPASE: CPT | Performed by: NURSE PRACTITIONER

## 2020-01-19 PROCEDURE — 99213 OFFICE O/P EST LOW 20 MIN: CPT | Performed by: NURSE PRACTITIONER

## 2020-01-19 PROCEDURE — 80053 COMPREHEN METABOLIC PANEL: CPT | Performed by: NURSE PRACTITIONER

## 2020-01-19 PROCEDURE — 85027 COMPLETE CBC AUTOMATED: CPT | Performed by: NURSE PRACTITIONER

## 2020-01-19 RX ORDER — METRONIDAZOLE 500 MG/1
500 TABLET ORAL 3 TIMES DAILY
Qty: 21 TABLET | Refills: 0 | Status: SHIPPED | OUTPATIENT
Start: 2020-01-19 | End: 2020-01-23

## 2020-01-19 RX ORDER — SULFAMETHOXAZOLE AND TRIMETHOPRIM 800; 160 MG/1; MG/1
1 TABLET ORAL 2 TIMES DAILY
Qty: 14 TABLET | Refills: 0 | Status: SHIPPED | OUTPATIENT
Start: 2020-01-19 | End: 2020-01-23

## 2020-01-19 NOTE — PATIENT INSTRUCTIONS
Diverticulitis    Diverticulitis is infection or inflammation of small pouches (diverticula) in the colon that form due to a condition called diverticulosis. Diverticula can trap stool (feces) and bacteria, causing infection and inflammation.  Diverticulitis may cause severe stomach pain and diarrhea. It may lead to tissue damage in the colon that causes bleeding. The diverticula may also burst (rupture) and cause infected stool to enter other areas of the abdomen.  Complications of diverticulitis can include:  · Bleeding.  · Severe infection.  · Severe pain.  · Rupture (perforation) of the colon.  · Blockage (obstruction) of the colon.  What are the causes?  This condition is caused by stool becoming trapped in the diverticula, which allows bacteria to grow in the diverticula. This leads to inflammation and infection.  What increases the risk?  You are more likely to develop this condition if:  · You have diverticulosis. The risk for diverticulosis increases if:  ? You are overweight or obese.  ? You use tobacco products.  ? You do not get enough exercise.  · You eat a diet that does not include enough fiber. High-fiber foods include fruits, vegetables, beans, nuts, and whole grains.  What are the signs or symptoms?  Symptoms of this condition may include:  · Pain and tenderness in the abdomen. The pain is normally located on the left side of the abdomen, but it may occur in other areas.  · Fever and chills.  · Bloating.  · Cramping.  · Nausea.  · Vomiting.  · Changes in bowel routines.  · Blood in your stool.  How is this diagnosed?  This condition is diagnosed based on:  · Your medical history.  · A physical exam.  · Tests to make sure there is nothing else causing your condition. These tests may include:  ? Blood tests.  ? Urine tests.  ? Imaging tests of the abdomen, including X-rays, ultrasounds, MRIs, or CT scans.  How is this treated?  Most cases of this condition are mild and can be treated at home.  Treatment may include:  · Taking over-the-counter pain medicines.  · Following a clear liquid diet.  · Taking antibiotic medicines by mouth.  · Rest.  More severe cases may need to be treated at a hospital. Treatment may include:  · Not eating or drinking.  · Taking prescription pain medicine.  · Receiving antibiotic medicines through an IV tube.  · Receiving fluids and nutrition through an IV tube.  · Surgery.  When your condition is under control, your health care provider may recommend that you have a colonoscopy. This is an exam to look at the entire large intestine. During the exam, a lubricated, bendable tube is inserted into the anus and then passed into the rectum, colon, and other parts of the large intestine. A colonoscopy can show how severe your diverticula are and whether something else may be causing your symptoms.  Follow these instructions at home:  Medicines  · Take over-the-counter and prescription medicines only as told by your health care provider. These include fiber supplements, probiotics, and stool softeners.  · If you were prescribed an antibiotic medicine, take it as told by your health care provider. Do not stop taking the antibiotic even if you start to feel better.  · Do not drive or use heavy machinery while taking prescription pain medicine.  General instructions    · Follow a full liquid diet or another diet as directed by your health care provider. After your symptoms improve, your health care provider may tell you to change your diet. He or she may recommend that you eat a diet that contains at least 25 g (25 grams) of fiber daily. Fiber makes it easier to pass stool. Healthy sources of fiber include:  ? Berries. One cup contains 4-8 grams of fiber.  ? Beans or lentils. One half cup contains 5-8 grams of fiber.  ? Green vegetables. One cup contains 4 grams of fiber.  · Exercise for at least 30 minutes, 3 times each week. You should exercise hard enough to raise your heart rate and  break a sweat.  · Keep all follow-up visits as told by your health care provider. This is important. You may need a colonoscopy.  Contact a health care provider if:  · Your pain does not improve.  · You have a hard time drinking or eating food.  · Your bowel movements do not return to normal.  Get help right away if:  · Your pain gets worse.  · Your symptoms do not get better with treatment.  · Your symptoms suddenly get worse.  · You have a fever.  · You vomit more than one time.  · You have stools that are bloody, black, or tarry.  Summary  · Diverticulitis is infection or inflammation of small pouches (diverticula) in the colon that form due to a condition called diverticulosis. Diverticula can trap stool (feces) and bacteria, causing infection and inflammation.  · You are at higher risk for this condition if you have diverticulosis and you eat a diet that does not include enough fiber.  · Most cases of this condition are mild and can be treated at home. More severe cases may need to be treated at a hospital.  · When your condition is under control, your health care provider may recommend that you have an exam called a colonoscopy. This exam can show how severe your diverticula are and whether something else may be causing your symptoms.  This information is not intended to replace advice given to you by your health care provider. Make sure you discuss any questions you have with your health care provider.  Document Released: 09/27/2006 Document Revised: 01/20/2018 Document Reviewed: 01/20/2018  [employer] Patient Education © 2019 [employer]

## 2020-01-19 NOTE — PROGRESS NOTES
"Subjective   Jessika Sousa is a 54 y.o. female.   Chief Complaint   Patient presents with   • Abdominal Pain     Pt states that it is shifting to the hips. Pt states that this has been going on for 11 days. Pt states that she had UA done at her GYN office and was good.      Abdominal Pain   This is a new problem. The current episode started 1 to 4 weeks ago. The onset quality is gradual. The problem occurs intermittently. The most recent episode lasted 11 days. The problem has been gradually worsening. The pain is located in the LLQ. The pain is at a severity of 4/10. The pain is mild. The quality of the pain is sharp. The abdominal pain does not radiate. Associated symptoms include diarrhea. Pertinent negatives include no fever, flatus, frequency, headaches, myalgias, nausea or vomiting. Associated symptoms comments: Has IBS   . The pain is aggravated by movement. The pain is relieved by certain positions. Treatments tried: ibuprofen  Her past medical history is significant for irritable bowel syndrome. There is no history of abdominal surgery, colon cancer, Crohn's disease, gallstones, GERD, pancreatitis, PUD or ulcerative colitis.    has been eating a vegan diet for 1 year. Has almonds/nuts daily.Working out at the [employer] 5 days a week   She was seen at her GYN office [facility] Tuesday reports her urine was negative for a UTI.       The following portions of the patient's history were reviewed and updated as appropriate: allergies, current medications, past family history, past medical history, past social history, past surgical history and problem list.    Review of Systems   Constitutional: Negative for fever.   Gastrointestinal: Positive for abdominal pain and diarrhea. Negative for flatus, nausea and vomiting.        \" I have IBS so diarrhea is not unusually\"  Brown color   No vomiting    Genitourinary: Negative for frequency.   Musculoskeletal: Negative for myalgias.   Neurological: Negative for " "headaches.     Past Surgical History:   Procedure Laterality Date   • FOOT SURGERY      Bilateral Foot Spur Remove      Past Medical History:   Diagnosis Date   • Irritable bowel syndrome        Objective   No Known Allergies  Visit Vitals  /70   Pulse 82   Temp 97.2 °F (36.2 °C)   Resp 18   Ht 157.5 cm (62\")   Wt 89.8 kg (198 lb)   LMP  (LMP Unknown)   SpO2 98%   BMI 36.21 kg/m²       Physical Exam   Constitutional: She is oriented to person, place, and time. Vital signs are normal. She appears well-developed and well-nourished. She is cooperative. She does not appear ill.   HENT:   Head: Normocephalic.   Eyes: Pupils are equal, round, and reactive to light.   Neck: Normal range of motion.   Cardiovascular: Normal rate, regular rhythm, S1 normal, S2 normal and normal heart sounds.   Pulmonary/Chest: Effort normal and breath sounds normal.   Abdominal: Soft. Bowel sounds are normal. She exhibits no distension and no mass. There is no hepatosplenomegaly. There is tenderness in the left lower quadrant. There is no rigidity, no rebound, no guarding and no CVA tenderness. No hernia.   Neurological: She is alert and oriented to person, place, and time.   Skin: Skin is warm, dry and intact. Capillary refill takes less than 2 seconds.   Psychiatric: She has a normal mood and affect. Her behavior is normal.   Vitals reviewed.      Assessment/Plan   Jessika was seen today for abdominal pain.    Diagnoses and all orders for this visit:    Left lower quadrant pain  -     POCT CBC  -     Lipase  -     Amylase  -     Comprehensive Metabolic Panel  -     CT Abdomen Pelvis With & Without Contrast; Future  -     XR abdomen 3 vw; Future    Diverticulitis  -     sulfamethoxazole-trimethoprim (BACTRIM DS,SEPTRA DS) 800-160 MG per tablet; Take 1 tablet by mouth 2 (Two) Times a Day for 7 days.  -     metroNIDAZOLE (FLAGYL) 500 MG tablet; Take 1 tablet by mouth 3 (Three) Times a Day for 7 days.    POCT CBC: WBC normal limits "   Will start treatment for possible diverticulitis today.  XR of abdomen  CT of abdomen and pelvis once approved.  Labs    She does not want to go to the ER today.   She is agreeable to go if she worsens or develops a fever.     Follow up with Dr. Fairbanks next week.              Patient Instructions   Diverticulitis    Diverticulitis is infection or inflammation of small pouches (diverticula) in the colon that form due to a condition called diverticulosis. Diverticula can trap stool (feces) and bacteria, causing infection and inflammation.  Diverticulitis may cause severe stomach pain and diarrhea. It may lead to tissue damage in the colon that causes bleeding. The diverticula may also burst (rupture) and cause infected stool to enter other areas of the abdomen.  Complications of diverticulitis can include:  · Bleeding.  · Severe infection.  · Severe pain.  · Rupture (perforation) of the colon.  · Blockage (obstruction) of the colon.  What are the causes?  This condition is caused by stool becoming trapped in the diverticula, which allows bacteria to grow in the diverticula. This leads to inflammation and infection.  What increases the risk?  You are more likely to develop this condition if:  · You have diverticulosis. The risk for diverticulosis increases if:  ? You are overweight or obese.  ? You use tobacco products.  ? You do not get enough exercise.  · You eat a diet that does not include enough fiber. High-fiber foods include fruits, vegetables, beans, nuts, and whole grains.  What are the signs or symptoms?  Symptoms of this condition may include:  · Pain and tenderness in the abdomen. The pain is normally located on the left side of the abdomen, but it may occur in other areas.  · Fever and chills.  · Bloating.  · Cramping.  · Nausea.  · Vomiting.  · Changes in bowel routines.  · Blood in your stool.  How is this diagnosed?  This condition is diagnosed based on:  · Your medical history.  · A physical  exam.  · Tests to make sure there is nothing else causing your condition. These tests may include:  ? Blood tests.  ? Urine tests.  ? Imaging tests of the abdomen, including X-rays, ultrasounds, MRIs, or CT scans.  How is this treated?  Most cases of this condition are mild and can be treated at home. Treatment may include:  · Taking over-the-counter pain medicines.  · Following a clear liquid diet.  · Taking antibiotic medicines by mouth.  · Rest.  More severe cases may need to be treated at a hospital. Treatment may include:  · Not eating or drinking.  · Taking prescription pain medicine.  · Receiving antibiotic medicines through an IV tube.  · Receiving fluids and nutrition through an IV tube.  · Surgery.  When your condition is under control, your health care provider may recommend that you have a colonoscopy. This is an exam to look at the entire large intestine. During the exam, a lubricated, bendable tube is inserted into the anus and then passed into the rectum, colon, and other parts of the large intestine. A colonoscopy can show how severe your diverticula are and whether something else may be causing your symptoms.  Follow these instructions at home:  Medicines  · Take over-the-counter and prescription medicines only as told by your health care provider. These include fiber supplements, probiotics, and stool softeners.  · If you were prescribed an antibiotic medicine, take it as told by your health care provider. Do not stop taking the antibiotic even if you start to feel better.  · Do not drive or use heavy machinery while taking prescription pain medicine.  General instructions    · Follow a full liquid diet or another diet as directed by your health care provider. After your symptoms improve, your health care provider may tell you to change your diet. He or she may recommend that you eat a diet that contains at least 25 g (25 grams) of fiber daily. Fiber makes it easier to pass stool. Healthy sources  of fiber include:  ? Berries. One cup contains 4-8 grams of fiber.  ? Beans or lentils. One half cup contains 5-8 grams of fiber.  ? Green vegetables. One cup contains 4 grams of fiber.  · Exercise for at least 30 minutes, 3 times each week. You should exercise hard enough to raise your heart rate and break a sweat.  · Keep all follow-up visits as told by your health care provider. This is important. You may need a colonoscopy.  Contact a health care provider if:  · Your pain does not improve.  · You have a hard time drinking or eating food.  · Your bowel movements do not return to normal.  Get help right away if:  · Your pain gets worse.  · Your symptoms do not get better with treatment.  · Your symptoms suddenly get worse.  · You have a fever.  · You vomit more than one time.  · You have stools that are bloody, black, or tarry.  Summary  · Diverticulitis is infection or inflammation of small pouches (diverticula) in the colon that form due to a condition called diverticulosis. Diverticula can trap stool (feces) and bacteria, causing infection and inflammation.  · You are at higher risk for this condition if you have diverticulosis and you eat a diet that does not include enough fiber.  · Most cases of this condition are mild and can be treated at home. More severe cases may need to be treated at a hospital.  · When your condition is under control, your health care provider may recommend that you have an exam called a colonoscopy. This exam can show how severe your diverticula are and whether something else may be causing your symptoms.  This information is not intended to replace advice given to you by your health care provider. Make sure you discuss any questions you have with your health care provider.  Document Released: 09/27/2006 Document Revised: 01/20/2018 Document Reviewed: 01/20/2018  [employer] Patient Education © 2019 [employer]        PREETI Krishnan

## 2020-01-20 ENCOUNTER — HOSPITAL ENCOUNTER (EMERGENCY)
Facility: HOSPITAL | Age: 55
Discharge: HOME | End: 2020-01-20
Attending: EMERGENCY MEDICINE | Admitting: EMERGENCY MEDICINE

## 2020-01-20 ENCOUNTER — APPOINTMENT (OUTPATIENT)
Dept: CT IMAGING | Facility: HOSPITAL | Age: 55
End: 2020-01-20

## 2020-01-20 VITALS
HEART RATE: 80 BPM | RESPIRATION RATE: 17 BRPM | TEMPERATURE: 98.8 F | DIASTOLIC BLOOD PRESSURE: 84 MMHG | OXYGEN SATURATION: 92 % | BODY MASS INDEX: 36.44 KG/M2 | HEIGHT: 62 IN | WEIGHT: 198 LBS | SYSTOLIC BLOOD PRESSURE: 129 MMHG

## 2020-01-20 DIAGNOSIS — R10.32 LEFT LOWER QUADRANT ABDOMINAL PAIN: Primary | ICD-10-CM

## 2020-01-20 LAB
ALBUMIN SERPL-MCNC: 4.5 G/DL (ref 3.5–5.2)
ALBUMIN/GLOB SERPL: 1.5 G/DL
ALP SERPL-CCNC: 62 U/L (ref 39–117)
ALT SERPL W P-5'-P-CCNC: 29 U/L (ref 1–33)
ANION GAP SERPL CALCULATED.3IONS-SCNC: 14 MMOL/L (ref 5–15)
AST SERPL-CCNC: 29 U/L (ref 1–32)
BACTERIA UR QL AUTO: ABNORMAL /HPF
BASOPHILS # BLD AUTO: 0.03 10*3/MM3 (ref 0–0.2)
BASOPHILS NFR BLD AUTO: 0.5 % (ref 0–1.5)
BILIRUB SERPL-MCNC: 0.5 MG/DL (ref 0.2–1.2)
BILIRUB UR QL STRIP: NEGATIVE
BUN BLD-MCNC: 6 MG/DL (ref 6–20)
BUN/CREAT SERPL: 9.7 (ref 7–25)
CALCIUM SPEC-SCNC: 9.4 MG/DL (ref 8.6–10.5)
CHLORIDE SERPL-SCNC: 107 MMOL/L (ref 98–107)
CLARITY UR: CLEAR
CO2 SERPL-SCNC: 22 MMOL/L (ref 22–29)
COLOR UR: YELLOW
CREAT BLD-MCNC: 0.62 MG/DL (ref 0.57–1)
D-LACTATE SERPL-SCNC: 1.6 MMOL/L (ref 0.5–2)
DEPRECATED RDW RBC AUTO: 42.6 FL (ref 37–54)
EOSINOPHIL # BLD AUTO: 0.1 10*3/MM3 (ref 0–0.4)
EOSINOPHIL NFR BLD AUTO: 1.8 % (ref 0.3–6.2)
ERYTHROCYTE [DISTWIDTH] IN BLOOD BY AUTOMATED COUNT: 13.6 % (ref 12.3–15.4)
GFR SERPL CREATININE-BSD FRML MDRD: 100 ML/MIN/1.73
GLOBULIN UR ELPH-MCNC: 3 GM/DL
GLUCOSE BLD-MCNC: 89 MG/DL (ref 65–99)
GLUCOSE UR STRIP-MCNC: NEGATIVE MG/DL
HCT VFR BLD AUTO: 43.8 % (ref 34–46.6)
HGB BLD-MCNC: 14.4 G/DL (ref 12–15.9)
HGB UR QL STRIP.AUTO: NEGATIVE
HOLD SPECIMEN: NORMAL
HOLD SPECIMEN: NORMAL
HYALINE CASTS UR QL AUTO: ABNORMAL /LPF
IMM GRANULOCYTES # BLD AUTO: 0.02 10*3/MM3 (ref 0–0.05)
IMM GRANULOCYTES NFR BLD AUTO: 0.4 % (ref 0–0.5)
KETONES UR QL STRIP: NEGATIVE
LEUKOCYTE ESTERASE UR QL STRIP.AUTO: ABNORMAL
LIPASE SERPL-CCNC: 30 U/L (ref 13–60)
LYMPHOCYTES # BLD AUTO: 1.46 10*3/MM3 (ref 0.7–3.1)
LYMPHOCYTES NFR BLD AUTO: 25.7 % (ref 19.6–45.3)
MCH RBC QN AUTO: 28.5 PG (ref 26.6–33)
MCHC RBC AUTO-ENTMCNC: 32.9 G/DL (ref 31.5–35.7)
MCV RBC AUTO: 86.6 FL (ref 79–97)
MONOCYTES # BLD AUTO: 0.38 10*3/MM3 (ref 0.1–0.9)
MONOCYTES NFR BLD AUTO: 6.7 % (ref 5–12)
NEUTROPHILS # BLD AUTO: 3.69 10*3/MM3 (ref 1.7–7)
NEUTROPHILS NFR BLD AUTO: 64.9 % (ref 42.7–76)
NITRITE UR QL STRIP: NEGATIVE
NRBC BLD AUTO-RTO: 0 /100 WBC (ref 0–0.2)
PH UR STRIP.AUTO: 5.5 [PH] (ref 5–8)
PLATELET # BLD AUTO: 275 10*3/MM3 (ref 140–450)
PMV BLD AUTO: 9.7 FL (ref 6–12)
POTASSIUM BLD-SCNC: 3.9 MMOL/L (ref 3.5–5.2)
PROT SERPL-MCNC: 7.5 G/DL (ref 6–8.5)
PROT UR QL STRIP: NEGATIVE
RBC # BLD AUTO: 5.06 10*6/MM3 (ref 3.77–5.28)
RBC # UR: ABNORMAL /HPF
REF LAB TEST METHOD: ABNORMAL
SODIUM BLD-SCNC: 143 MMOL/L (ref 136–145)
SP GR UR STRIP: 1.01 (ref 1–1.03)
SQUAMOUS #/AREA URNS HPF: ABNORMAL /HPF
UROBILINOGEN UR QL STRIP: ABNORMAL
WBC NRBC COR # BLD: 5.68 10*3/MM3 (ref 3.4–10.8)
WBC UR QL AUTO: ABNORMAL /HPF
WHOLE BLOOD HOLD SPECIMEN: NORMAL
WHOLE BLOOD HOLD SPECIMEN: NORMAL

## 2020-01-20 PROCEDURE — 83605 ASSAY OF LACTIC ACID: CPT | Performed by: EMERGENCY MEDICINE

## 2020-01-20 PROCEDURE — 25010000002 HYDROMORPHONE PER 4 MG: Performed by: EMERGENCY MEDICINE

## 2020-01-20 PROCEDURE — 83690 ASSAY OF LIPASE: CPT | Performed by: EMERGENCY MEDICINE

## 2020-01-20 PROCEDURE — 81001 URINALYSIS AUTO W/SCOPE: CPT | Performed by: EMERGENCY MEDICINE

## 2020-01-20 PROCEDURE — 85025 COMPLETE CBC W/AUTO DIFF WBC: CPT | Performed by: EMERGENCY MEDICINE

## 2020-01-20 PROCEDURE — 25010000002 ONDANSETRON PER 1 MG: Performed by: EMERGENCY MEDICINE

## 2020-01-20 PROCEDURE — 99283 EMERGENCY DEPT VISIT LOW MDM: CPT

## 2020-01-20 PROCEDURE — 96375 TX/PRO/DX INJ NEW DRUG ADDON: CPT

## 2020-01-20 PROCEDURE — 96374 THER/PROPH/DIAG INJ IV PUSH: CPT

## 2020-01-20 PROCEDURE — 74177 CT ABD & PELVIS W/CONTRAST: CPT

## 2020-01-20 PROCEDURE — 25010000002 IOPAMIDOL 61 % SOLUTION: Performed by: EMERGENCY MEDICINE

## 2020-01-20 PROCEDURE — 80053 COMPREHEN METABOLIC PANEL: CPT | Performed by: EMERGENCY MEDICINE

## 2020-01-20 RX ORDER — ONDANSETRON 4 MG/1
4 TABLET, FILM COATED ORAL EVERY 8 HOURS PRN
Qty: 15 TABLET | Refills: 0 | Status: SHIPPED | OUTPATIENT
Start: 2020-01-20 | End: 2020-03-10

## 2020-01-20 RX ORDER — DICYCLOMINE HCL 20 MG
20 TABLET ORAL EVERY 8 HOURS PRN
Qty: 20 TABLET | Refills: 0 | Status: SHIPPED | OUTPATIENT
Start: 2020-01-20 | End: 2020-03-10

## 2020-01-20 RX ORDER — ONDANSETRON 2 MG/ML
4 INJECTION INTRAMUSCULAR; INTRAVENOUS ONCE
Status: COMPLETED | OUTPATIENT
Start: 2020-01-20 | End: 2020-01-20

## 2020-01-20 RX ORDER — SODIUM CHLORIDE 0.9 % (FLUSH) 0.9 %
10 SYRINGE (ML) INJECTION AS NEEDED
Status: DISCONTINUED | OUTPATIENT
Start: 2020-01-20 | End: 2020-01-20 | Stop reason: HOSPADM

## 2020-01-20 RX ORDER — HYDROMORPHONE HYDROCHLORIDE 1 MG/ML
0.5 INJECTION, SOLUTION INTRAMUSCULAR; INTRAVENOUS; SUBCUTANEOUS ONCE
Status: COMPLETED | OUTPATIENT
Start: 2020-01-20 | End: 2020-01-20

## 2020-01-20 RX ADMIN — HYDROMORPHONE HYDROCHLORIDE 0.5 MG: 1 INJECTION, SOLUTION INTRAMUSCULAR; INTRAVENOUS; SUBCUTANEOUS at 10:50

## 2020-01-20 RX ADMIN — ONDANSETRON 4 MG: 2 INJECTION INTRAMUSCULAR; INTRAVENOUS at 10:51

## 2020-01-20 RX ADMIN — SODIUM CHLORIDE 1000 ML: 9 INJECTION, SOLUTION INTRAVENOUS at 10:49

## 2020-01-20 RX ADMIN — IOPAMIDOL 80 ML: 612 INJECTION, SOLUTION INTRAVENOUS at 11:24

## 2020-01-20 NOTE — ED PROVIDER NOTES
Subjective   54-year-old female presents for evaluation of lower abdominal pain.  She states that for the past 12 days or so she has been experiencing pain in her lower abdomen.  The pain seems to be the worst in her left lower quadrant and is gradually worsening.  She denies any urinary symptoms.  No nausea or vomiting.  No fevers.  She is having normal bowel movements.  She went to urgent care regarding her symptoms and was advised to come to the emergency department if her symptoms did not improve.  Pain is currently 7 out of 10 in severity.  The pain is worse with palpation and movement.      History provided by:  Patient  Abdominal Pain   Pain location:  LLQ  Pain radiates to:  RLQ  Pain severity:  Moderate  Onset quality:  Sudden  Duration:  12 days  Timing:  Intermittent  Progression:  Worsening  Chronicity:  New  Associated symptoms: no dysuria, no nausea and no vomiting        Review of Systems   Gastrointestinal: Positive for abdominal pain. Negative for nausea and vomiting.   Genitourinary: Negative for dysuria, frequency and urgency.   Skin: Negative for rash.   All other systems reviewed and are negative.      Past Medical History:   Diagnosis Date   • Irritable bowel syndrome        No Known Allergies    Past Surgical History:   Procedure Laterality Date   • FOOT SURGERY      Bilateral Foot Spur Remove        Family History   Problem Relation Age of Onset   • Hypertension Mother    • Cancer Mother        Social History     Socioeconomic History   • Marital status:      Spouse name: Not on file   • Number of children: Not on file   • Years of education: Not on file   • Highest education level: Not on file   Tobacco Use   • Smoking status: Never Smoker   • Smokeless tobacco: Never Used   Substance and Sexual Activity   • Alcohol use: Yes     Comment: OCC   • Drug use: No   • Sexual activity: Defer         Objective   Physical Exam   Constitutional: She is oriented to person, place, and time. She  appears well-developed and well-nourished. No distress.   Nontoxic-appearing female   HENT:   Head: Normocephalic and atraumatic.   Mouth/Throat: Oropharynx is clear and moist.   Eyes: Pupils are equal, round, and reactive to light. EOM are normal.   Neck: Normal range of motion. Neck supple.   Cardiovascular: Normal rate and normal heart sounds. Exam reveals no gallop and no friction rub.   No murmur heard.  Pulmonary/Chest: Effort normal and breath sounds normal. No respiratory distress. She has no wheezes. She has no rales.   Abdominal: Soft. Normal appearance and bowel sounds are normal. She exhibits no distension and no mass. There is tenderness. There is guarding. There is no rebound.   Focal tenderness to left lower quadrant with voluntary guarding present, no pain out of proportion to exam   Genitourinary:   Genitourinary Comments: No CVA tenderness noted   Musculoskeletal: Normal range of motion.   Neurological: She is alert and oriented to person, place, and time.   Skin: Skin is warm and dry. No rash noted. She is not diaphoretic. No erythema.   Psychiatric: She has a normal mood and affect. Judgment and thought content normal.   Nursing note and vitals reviewed.      Procedures         ED Course  ED Course as of Jan 20 1255   Mon Jan 20, 2020   1142 54-year-old female presents complaining of gradually worsening lower abdominal pain over the past 12 days, most prominently in her left lower quadrant.  On arrival to the ED, patient nontoxic-appearing.  Exam remarkable for focal left lower quadrant tenderness.  No pain out of proportion to exam.  Labs unrevealing.  We will obtain imaging and will reassess following initial interventions.    [DD]   1245 Upon reevaluation, patient improved.  Reassured and counseled regarding symptomatic management.  Given the duration of her symptoms, she was referred to gastroenterology and will follow-up within the next week.  Scripts for Bentyl and Zofran as needed.  She  will follow-up as directed.  Agreeable with plan and given appropriate strict return precautions.    [DD]   1246 Dr. Leal is at bedside updating the patient.    [SK]   1250 We discussed the potential benign etiologies of her symptoms including but not limited to her IBS.    [DD]      ED Course User Index  [DD] Mac Leal MD  [SK] Anna Marie Ba     Recent Results (from the past 24 hour(s))   Lipase    Collection Time: 01/19/20  3:26 PM   Result Value Ref Range    Lipase 42 13 - 60 U/L   Amylase    Collection Time: 01/19/20  3:26 PM   Result Value Ref Range    Amylase 52 28 - 100 U/L   Comprehensive Metabolic Panel    Collection Time: 01/19/20  3:26 PM   Result Value Ref Range    Glucose 119 (H) 65 - 99 mg/dL    BUN 11 6 - 20 mg/dL    Creatinine 0.56 (L) 0.57 - 1.00 mg/dL    Sodium 142 136 - 145 mmol/L    Potassium 4.2 3.5 - 5.2 mmol/L    Chloride 104 98 - 107 mmol/L    CO2 24.1 22.0 - 29.0 mmol/L    Calcium 9.2 8.6 - 10.5 mg/dL    Total Protein 6.8 6.0 - 8.5 g/dL    Albumin 4.40 3.50 - 5.20 g/dL    ALT (SGPT) 21 1 - 33 U/L    AST (SGOT) 20 1 - 32 U/L    Alkaline Phosphatase 57 39 - 117 U/L    Total Bilirubin 0.2 0.2 - 1.2 mg/dL    eGFR Non African Amer 113 >60 mL/min/1.73    Globulin 2.4 gm/dL    A/G Ratio 1.8 g/dL    BUN/Creatinine Ratio 19.6 7.0 - 25.0    Anion Gap 13.9 5.0 - 15.0 mmol/L   POCT CBC    Collection Time: 01/19/20  3:48 PM   Result Value Ref Range    Hematocrit 42.4 38 - 51 %    Hemoglobin 13.6 12.0 - 17.0 g/dL    RBC 4.74     WBC 6.0     MCV 89.4     MCH 28.7     MCHC 32.1     RDW-CV 13.2     Platelets 271 10*3/mm3    MPV 7.7    Urinalysis With Microscopic If Indicated (No Culture) - Urine, Clean Catch    Collection Time: 01/20/20 10:20 AM   Result Value Ref Range    Color, UA Yellow Yellow, Straw    Appearance, UA Clear Clear    pH, UA 5.5 5.0 - 8.0    Specific Gravity, UA 1.008 1.001 - 1.030    Glucose, UA Negative Negative    Ketones, UA Negative Negative    Bilirubin, UA Negative  Negative    Blood, UA Negative Negative    Protein, UA Negative Negative    Leuk Esterase, UA Small (1+) (A) Negative    Nitrite, UA Negative Negative    Urobilinogen, UA 0.2 E.U./dL 0.2 - 1.0 E.U./dL   Urinalysis, Microscopic Only - Urine, Clean Catch    Collection Time: 01/20/20 10:20 AM   Result Value Ref Range    RBC, UA 0-2 None Seen, 0-2 /HPF    WBC, UA 3-5 (A) None Seen, 0-2 /HPF    Bacteria, UA None Seen None Seen, Trace /HPF    Squamous Epithelial Cells, UA 0-2 None Seen, 0-2 /HPF    Hyaline Casts, UA None Seen 0 - 6 /LPF    Methodology Automated Microscopy    Comprehensive Metabolic Panel    Collection Time: 01/20/20 10:41 AM   Result Value Ref Range    Glucose 89 65 - 99 mg/dL    BUN 6 6 - 20 mg/dL    Creatinine 0.62 0.57 - 1.00 mg/dL    Sodium 143 136 - 145 mmol/L    Potassium 3.9 3.5 - 5.2 mmol/L    Chloride 107 98 - 107 mmol/L    CO2 22.0 22.0 - 29.0 mmol/L    Calcium 9.4 8.6 - 10.5 mg/dL    Total Protein 7.5 6.0 - 8.5 g/dL    Albumin 4.50 3.50 - 5.20 g/dL    ALT (SGPT) 29 1 - 33 U/L    AST (SGOT) 29 1 - 32 U/L    Alkaline Phosphatase 62 39 - 117 U/L    Total Bilirubin 0.5 0.2 - 1.2 mg/dL    eGFR Non African Amer 100 >60 mL/min/1.73    Globulin 3.0 gm/dL    A/G Ratio 1.5 g/dL    BUN/Creatinine Ratio 9.7 7.0 - 25.0    Anion Gap 14.0 5.0 - 15.0 mmol/L   Lipase    Collection Time: 01/20/20 10:41 AM   Result Value Ref Range    Lipase 30 13 - 60 U/L   Light Blue Top    Collection Time: 01/20/20 10:41 AM   Result Value Ref Range    Extra Tube hold for add-on    Green Top (Gel)    Collection Time: 01/20/20 10:41 AM   Result Value Ref Range    Extra Tube Hold for add-ons.    Lavender Top    Collection Time: 01/20/20 10:41 AM   Result Value Ref Range    Extra Tube hold for add-on    Gold Top - SST    Collection Time: 01/20/20 10:41 AM   Result Value Ref Range    Extra Tube Hold for add-ons.    CBC Auto Differential    Collection Time: 01/20/20 10:41 AM   Result Value Ref Range    WBC 5.68 3.40 - 10.80 10*3/mm3     RBC 5.06 3.77 - 5.28 10*6/mm3    Hemoglobin 14.4 12.0 - 15.9 g/dL    Hematocrit 43.8 34.0 - 46.6 %    MCV 86.6 79.0 - 97.0 fL    MCH 28.5 26.6 - 33.0 pg    MCHC 32.9 31.5 - 35.7 g/dL    RDW 13.6 12.3 - 15.4 %    RDW-SD 42.6 37.0 - 54.0 fl    MPV 9.7 6.0 - 12.0 fL    Platelets 275 140 - 450 10*3/mm3    Neutrophil % 64.9 42.7 - 76.0 %    Lymphocyte % 25.7 19.6 - 45.3 %    Monocyte % 6.7 5.0 - 12.0 %    Eosinophil % 1.8 0.3 - 6.2 %    Basophil % 0.5 0.0 - 1.5 %    Immature Grans % 0.4 0.0 - 0.5 %    Neutrophils, Absolute 3.69 1.70 - 7.00 10*3/mm3    Lymphocytes, Absolute 1.46 0.70 - 3.10 10*3/mm3    Monocytes, Absolute 0.38 0.10 - 0.90 10*3/mm3    Eosinophils, Absolute 0.10 0.00 - 0.40 10*3/mm3    Basophils, Absolute 0.03 0.00 - 0.20 10*3/mm3    Immature Grans, Absolute 0.02 0.00 - 0.05 10*3/mm3    nRBC 0.0 0.0 - 0.2 /100 WBC   Lactic Acid, Plasma    Collection Time: 01/20/20 10:41 AM   Result Value Ref Range    Lactate 1.6 0.5 - 2.0 mmol/L     Note: In addition to lab results from this visit, the labs listed above may include labs taken at another facility or during a different encounter within the last 24 hours. Please correlate lab times with ED admission and discharge times for further clarification of the services performed during this visit.    CT Abdomen Pelvis With Contrast   Preliminary Result   No evidence of acute inflammatory focus or other clearly   acute disease. Relatively decompressed appearance of the colon,   nonspecific, possibly reflecting history of diarrhea.                Vitals:    01/20/20 1200 01/20/20 1215 01/20/20 1230 01/20/20 1245   BP: 125/77  124/85    BP Location:       Patient Position:       Pulse:       Resp:       Temp:       TempSrc:       SpO2: 95% 92% 94% 93%   Weight:       Height:         Medications   sodium chloride 0.9 % flush 10 mL (has no administration in time range)   sodium chloride 0.9 % bolus 1,000 mL (0 mL Intravenous Stopped 1/20/20 1248)   HYDROmorphone  (DILAUDID) injection 0.5 mg (0.5 mg Intravenous Given 1/20/20 1050)   ondansetron (ZOFRAN) injection 4 mg (4 mg Intravenous Given 1/20/20 1051)   iopamidol (ISOVUE-300) 61 % injection 100 mL (80 mL Intravenous Given 1/20/20 1124)     ECG/EMG Results (last 24 hours)     ** No results found for the last 24 hours. **        No orders to display                                            Recent Results (from the past 24 hour(s))   Lipase    Collection Time: 01/19/20  3:26 PM   Result Value Ref Range    Lipase 42 13 - 60 U/L   Amylase    Collection Time: 01/19/20  3:26 PM   Result Value Ref Range    Amylase 52 28 - 100 U/L   Comprehensive Metabolic Panel    Collection Time: 01/19/20  3:26 PM   Result Value Ref Range    Glucose 119 (H) 65 - 99 mg/dL    BUN 11 6 - 20 mg/dL    Creatinine 0.56 (L) 0.57 - 1.00 mg/dL    Sodium 142 136 - 145 mmol/L    Potassium 4.2 3.5 - 5.2 mmol/L    Chloride 104 98 - 107 mmol/L    CO2 24.1 22.0 - 29.0 mmol/L    Calcium 9.2 8.6 - 10.5 mg/dL    Total Protein 6.8 6.0 - 8.5 g/dL    Albumin 4.40 3.50 - 5.20 g/dL    ALT (SGPT) 21 1 - 33 U/L    AST (SGOT) 20 1 - 32 U/L    Alkaline Phosphatase 57 39 - 117 U/L    Total Bilirubin 0.2 0.2 - 1.2 mg/dL    eGFR Non African Amer 113 >60 mL/min/1.73    Globulin 2.4 gm/dL    A/G Ratio 1.8 g/dL    BUN/Creatinine Ratio 19.6 7.0 - 25.0    Anion Gap 13.9 5.0 - 15.0 mmol/L   POCT CBC    Collection Time: 01/19/20  3:48 PM   Result Value Ref Range    Hematocrit 42.4 38 - 51 %    Hemoglobin 13.6 12.0 - 17.0 g/dL    RBC 4.74     WBC 6.0     MCV 89.4     MCH 28.7     MCHC 32.1     RDW-CV 13.2     Platelets 271 10*3/mm3    MPV 7.7    Urinalysis With Microscopic If Indicated (No Culture) - Urine, Clean Catch    Collection Time: 01/20/20 10:20 AM   Result Value Ref Range    Color, UA Yellow Yellow, Straw    Appearance, UA Clear Clear    pH, UA 5.5 5.0 - 8.0    Specific Gravity, UA 1.008 1.001 - 1.030    Glucose, UA Negative Negative    Ketones, UA Negative Negative     Bilirubin, UA Negative Negative    Blood, UA Negative Negative    Protein, UA Negative Negative    Leuk Esterase, UA Small (1+) (A) Negative    Nitrite, UA Negative Negative    Urobilinogen, UA 0.2 E.U./dL 0.2 - 1.0 E.U./dL   Urinalysis, Microscopic Only - Urine, Clean Catch    Collection Time: 01/20/20 10:20 AM   Result Value Ref Range    RBC, UA 0-2 None Seen, 0-2 /HPF    WBC, UA 3-5 (A) None Seen, 0-2 /HPF    Bacteria, UA None Seen None Seen, Trace /HPF    Squamous Epithelial Cells, UA 0-2 None Seen, 0-2 /HPF    Hyaline Casts, UA None Seen 0 - 6 /LPF    Methodology Automated Microscopy    Comprehensive Metabolic Panel    Collection Time: 01/20/20 10:41 AM   Result Value Ref Range    Glucose 89 65 - 99 mg/dL    BUN 6 6 - 20 mg/dL    Creatinine 0.62 0.57 - 1.00 mg/dL    Sodium 143 136 - 145 mmol/L    Potassium 3.9 3.5 - 5.2 mmol/L    Chloride 107 98 - 107 mmol/L    CO2 22.0 22.0 - 29.0 mmol/L    Calcium 9.4 8.6 - 10.5 mg/dL    Total Protein 7.5 6.0 - 8.5 g/dL    Albumin 4.50 3.50 - 5.20 g/dL    ALT (SGPT) 29 1 - 33 U/L    AST (SGOT) 29 1 - 32 U/L    Alkaline Phosphatase 62 39 - 117 U/L    Total Bilirubin 0.5 0.2 - 1.2 mg/dL    eGFR Non African Amer 100 >60 mL/min/1.73    Globulin 3.0 gm/dL    A/G Ratio 1.5 g/dL    BUN/Creatinine Ratio 9.7 7.0 - 25.0    Anion Gap 14.0 5.0 - 15.0 mmol/L   Lipase    Collection Time: 01/20/20 10:41 AM   Result Value Ref Range    Lipase 30 13 - 60 U/L   Light Blue Top    Collection Time: 01/20/20 10:41 AM   Result Value Ref Range    Extra Tube hold for add-on    Green Top (Gel)    Collection Time: 01/20/20 10:41 AM   Result Value Ref Range    Extra Tube Hold for add-ons.    Lavender Top    Collection Time: 01/20/20 10:41 AM   Result Value Ref Range    Extra Tube hold for add-on    Gold Top - SST    Collection Time: 01/20/20 10:41 AM   Result Value Ref Range    Extra Tube Hold for add-ons.    CBC Auto Differential    Collection Time: 01/20/20 10:41 AM   Result Value Ref Range    WBC 5.68  3.40 - 10.80 10*3/mm3    RBC 5.06 3.77 - 5.28 10*6/mm3    Hemoglobin 14.4 12.0 - 15.9 g/dL    Hematocrit 43.8 34.0 - 46.6 %    MCV 86.6 79.0 - 97.0 fL    MCH 28.5 26.6 - 33.0 pg    MCHC 32.9 31.5 - 35.7 g/dL    RDW 13.6 12.3 - 15.4 %    RDW-SD 42.6 37.0 - 54.0 fl    MPV 9.7 6.0 - 12.0 fL    Platelets 275 140 - 450 10*3/mm3    Neutrophil % 64.9 42.7 - 76.0 %    Lymphocyte % 25.7 19.6 - 45.3 %    Monocyte % 6.7 5.0 - 12.0 %    Eosinophil % 1.8 0.3 - 6.2 %    Basophil % 0.5 0.0 - 1.5 %    Immature Grans % 0.4 0.0 - 0.5 %    Neutrophils, Absolute 3.69 1.70 - 7.00 10*3/mm3    Lymphocytes, Absolute 1.46 0.70 - 3.10 10*3/mm3    Monocytes, Absolute 0.38 0.10 - 0.90 10*3/mm3    Eosinophils, Absolute 0.10 0.00 - 0.40 10*3/mm3    Basophils, Absolute 0.03 0.00 - 0.20 10*3/mm3    Immature Grans, Absolute 0.02 0.00 - 0.05 10*3/mm3    nRBC 0.0 0.0 - 0.2 /100 WBC   Lactic Acid, Plasma    Collection Time: 01/20/20 10:41 AM   Result Value Ref Range    Lactate 1.6 0.5 - 2.0 mmol/L     Note: In addition to lab results from this visit, the labs listed above may include labs taken at another facility or during a different encounter within the last 24 hours. Please correlate lab times with ED admission and discharge times for further clarification of the services performed during this visit.    CT Abdomen Pelvis With Contrast   Preliminary Result   No evidence of acute inflammatory focus or other clearly   acute disease. Relatively decompressed appearance of the colon,   nonspecific, possibly reflecting history of diarrhea.                Vitals:    01/20/20 1255 01/20/20 1259 01/20/20 1300 01/20/20 1325   BP:   129/84    BP Location:       Patient Position:       Pulse:    80   Resp:    17   Temp:       TempSrc:       SpO2: 94% 97% 92%    Weight:       Height:         Medications   sodium chloride 0.9 % flush 10 mL (has no administration in time range)   sodium chloride 0.9 % bolus 1,000 mL (0 mL Intravenous Stopped 1/20/20 1248)    HYDROmorphone (DILAUDID) injection 0.5 mg (0.5 mg Intravenous Given 1/20/20 1050)   ondansetron (ZOFRAN) injection 4 mg (4 mg Intravenous Given 1/20/20 1051)   iopamidol (ISOVUE-300) 61 % injection 100 mL (80 mL Intravenous Given 1/20/20 1124)     ECG/EMG Results (last 24 hours)     ** No results found for the last 24 hours. **        No orders to display             MDM    Final diagnoses:   Left lower quadrant abdominal pain       Documentation assistance provided by suni Ba.  Information recorded by the scrdev was done at my direction and has been verified and validated by me.     Anna Marie Ba  01/20/20 1020       Anna Marie Ba  01/20/20 1255       Mac Leal MD  01/20/20 0661

## 2020-01-20 NOTE — DISCHARGE INSTRUCTIONS
Follow up with your primary care provider as instructed.    Return immediately for new or changing concerns.    Please review the medications you are supposed to be taking according to prior physician recommendations. I have not changed your home medications during this visit. If your discharge instructions indicate that I have changed your home medications, this is not the case, and you should disregard. If you have any questions about the medication you should be taking at home, please call your physician.

## 2020-01-23 ENCOUNTER — OFFICE VISIT (OUTPATIENT)
Dept: GASTROENTEROLOGY | Facility: CLINIC | Age: 55
End: 2020-01-23

## 2020-01-23 VITALS
DIASTOLIC BLOOD PRESSURE: 90 MMHG | HEIGHT: 62 IN | OXYGEN SATURATION: 98 % | SYSTOLIC BLOOD PRESSURE: 134 MMHG | HEART RATE: 84 BPM | TEMPERATURE: 99.5 F | WEIGHT: 194 LBS | BODY MASS INDEX: 35.7 KG/M2

## 2020-01-23 DIAGNOSIS — Z83.71 FAMILY HISTORY OF POLYPS IN THE COLON: ICD-10-CM

## 2020-01-23 DIAGNOSIS — R10.32 LEFT LOWER QUADRANT ABDOMINAL PAIN: Primary | ICD-10-CM

## 2020-01-23 DIAGNOSIS — R14.0 BLOATING: ICD-10-CM

## 2020-01-23 PROCEDURE — 99244 OFF/OP CNSLTJ NEW/EST MOD 40: CPT | Performed by: INTERNAL MEDICINE

## 2020-01-23 NOTE — PROGRESS NOTES
PCP: Kajal Fairbanks DO    Chief Complaint   Patient presents with   • Abdominal Pain       History of Present Illness:   HPI  I appreciate the consult for abdominal pain.  Mrs. Sousa is a 54-year-old with a history of irritable bowel syndrome diagnosed years ago.  She states that about 2 weeks ago there was development of sharp lower abdominal pain.  The pain was intermittent in time.  She states there was an increase if she would shift positions particularly around her hips.  There is also some increase of the discomfort with sitting.  The patient denies any association of the pain with bowel movements.  There is no history of blood in the stool.  Mrs. Sousa states that she generally has 3 bowel movements daily.  The [place] score can be from 4-7.  There is no history of nocturnal diarrhea.  She has undergone previous colonoscopy about 6 years ago and no abnormalities were found.  There is no family history of colon cancer.  Her brother apparently did have a colon polyp that required surgery.  The patient denies any difficult or painful swallowing.  There is no history of breakthrough heartburn.  She denies any bloating with meals.  There is no history of unexplained weight loss.  She denies night sweats, fever or chills.  There is no family history of Crohn's disease or ulcerative colitis.  Past Medical History:   Diagnosis Date   • Irritable bowel syndrome        Past Surgical History:   Procedure Laterality Date   • FOOT SURGERY      Bilateral Foot Spur Remove          Current Outpatient Medications:   •  dicyclomine (BENTYL) 20 MG tablet, Take 1 tablet by mouth Every 8 (Eight) Hours As Needed (pain, cramps)., Disp: 20 tablet, Rfl: 0  •  ondansetron (ZOFRAN) 4 MG tablet, Take 1 tablet by mouth Every 8 (Eight) Hours As Needed for Nausea., Disp: 15 tablet, Rfl: 0  •  Prasterone (INTRAROSA) 6.5 MG insert, Intrarosa 6.5 mg vaginal insert  Insert 1 vaginal insert every day by vaginal route for 28 days., Disp: ,  Rfl:     Current Facility-Administered Medications:   •  cyanocobalamin injection 1,000 mcg, 1,000 mcg, Intramuscular, Q28 Days, Fairbanks, Kajal A, DO, 1,000 mcg at 10/14/19 1128  •  cyanocobalamin injection 1,000 mcg, 1,000 mcg, Intramuscular, Q28 Days, Fairbanks, Kajal A, DO, 1,000 mcg at 01/10/20 1242    No Known Allergies    Family History   Problem Relation Age of Onset   • Hypertension Mother    • Cancer Mother        Social History     Socioeconomic History   • Marital status:      Spouse name: Not on file   • Number of children: Not on file   • Years of education: Not on file   • Highest education level: Not on file   Tobacco Use   • Smoking status: Never Smoker   • Smokeless tobacco: Never Used   Substance and Sexual Activity   • Alcohol use: Yes     Comment: OCC   • Drug use: No   • Sexual activity: Defer       Review of Systems   Constitutional: Positive for unexpected weight change. Negative for activity change, appetite change, fatigue and fever.   HENT: Negative for dental problem, hearing loss, mouth sores, postnasal drip, sneezing, trouble swallowing and voice change.    Eyes: Negative for pain, redness, itching and visual disturbance.   Respiratory: Negative for cough, choking, chest tightness, shortness of breath and wheezing.    Cardiovascular: Negative for chest pain, palpitations and leg swelling.   Gastrointestinal: Positive for abdominal distention (bloating), abdominal pain and diarrhea. Negative for anal bleeding, blood in stool, constipation, nausea, rectal pain and vomiting.   Endocrine: Negative for cold intolerance, heat intolerance, polydipsia, polyphagia and polyuria.   Genitourinary: Negative.  Negative for dysuria, enuresis, flank pain, hematuria and urgency.   Musculoskeletal: Negative for arthralgias, back pain, gait problem, joint swelling and myalgias.   Skin: Negative for color change, pallor and rash.   Allergic/Immunologic: Negative for environmental allergies, food allergies  and immunocompromised state.   Neurological: Negative for dizziness, tremors, seizures, facial asymmetry, speech difficulty, numbness and headaches.   Hematological: Negative for adenopathy.   Psychiatric/Behavioral: Negative for behavioral problems, confusion, dysphoric mood, hallucinations and self-injury.       Vitals:    01/23/20 1017   BP: 134/90   Pulse: 84   Temp: 99.5 °F (37.5 °C)   SpO2: 98%       Physical Exam   Constitutional: She is oriented to person, place, and time. She appears well-nourished. No distress.   HENT:   Head: Atraumatic.   Mouth/Throat: Oropharynx is clear and moist. No oropharyngeal exudate.   Eyes: EOM are normal. No scleral icterus.   Neck: Neck supple. No thyromegaly present.   Cardiovascular: Normal rate, regular rhythm and normal heart sounds. Exam reveals no gallop.   No murmur heard.  Pulmonary/Chest: Effort normal and breath sounds normal. She has no wheezes. She has no rales.   Abdominal: Soft. Bowel sounds are normal. There is tenderness (left lower, suprapubic ). There is no rebound and no guarding.   Musculoskeletal: Normal range of motion. She exhibits no edema.   Lymphadenopathy:     She has no cervical adenopathy.   Neurological: She is alert and oriented to person, place, and time. She exhibits normal muscle tone.   Skin: Skin is dry. No erythema.   Psychiatric: She has a normal mood and affect. Her behavior is normal. Thought content normal.   Vitals reviewed.      Jessika was seen today for abdominal pain.    Diagnoses and all orders for this visit:    Left lower quadrant abdominal pain  -     Colonoscopy; Future    Bloating  -     Colonoscopy; Future    Family history of polyps in the colon    The patient has been diagnosed with irritable bowel syndrome in the past.  She states there is a family history of colon polyps.  Certainly, cannot exclude any mucosal abnormality and further evaluation is warranted to rule out colorectal polyps, colorectal cancer, microscopic  colitis and IBD.  There is a component that suggest possible musculoskeletal etiology.  There is no known lumbar disc disease.  She had a CT scan of the pelvis that did not demonstrate any obvious abnormality of the ovaries.      Plan: Will schedule colonoscopy for further evaluation.

## 2020-01-25 ENCOUNTER — PATIENT MESSAGE (OUTPATIENT)
Dept: GASTROENTEROLOGY | Facility: CLINIC | Age: 55
End: 2020-01-25

## 2020-01-27 RX ORDER — SODIUM, POTASSIUM,MAG SULFATES 17.5-3.13G
1 SOLUTION, RECONSTITUTED, ORAL ORAL TAKE AS DIRECTED
Qty: 2 BOTTLE | Refills: 0 | Status: SHIPPED | OUTPATIENT
Start: 2020-01-27 | End: 2020-02-05

## 2020-01-27 NOTE — TELEPHONE ENCOUNTER
"From: Jessika Sousa  To: Tay Khalil MD  Sent: 1/25/2020 10:58 AM EST  Subject: Prescription Question    Just signed into [facility]. Saw that there's nothing showing up under \"future appointments\" for this Wednesday's (Jan 29th) Colonoscopy appointment. I've also tried to find out where my prescription for SUPREP was sent. There's some confusion about where the [facility] (due to [facility]'s acquisition of [facility]). I called the office on Friday and told them to send the prescription to the [facility] site in [place], [place]. The office told me I would receive a call from a nurse Friday but I never received that callback. I called [facility] in [place] and they have no record of any prescription for me. Please advise. Asmita Sousa [phone]  "

## 2020-01-29 ENCOUNTER — LAB REQUISITION (OUTPATIENT)
Dept: LAB | Facility: HOSPITAL | Age: 55
End: 2020-01-29

## 2020-01-29 ENCOUNTER — OUTSIDE FACILITY SERVICE (OUTPATIENT)
Dept: GASTROENTEROLOGY | Facility: CLINIC | Age: 55
End: 2020-01-29

## 2020-01-29 DIAGNOSIS — R19.7 DIARRHEA, UNSPECIFIED: ICD-10-CM

## 2020-01-29 DIAGNOSIS — R10.30 LOWER ABDOMINAL PAIN, UNSPECIFIED: ICD-10-CM

## 2020-01-29 DIAGNOSIS — R10.32 LEFT LOWER QUADRANT PAIN: ICD-10-CM

## 2020-01-29 DIAGNOSIS — R14.0 ABDOMINAL DISTENSION (GASEOUS): ICD-10-CM

## 2020-01-29 PROCEDURE — 45380 COLONOSCOPY AND BIOPSY: CPT | Performed by: INTERNAL MEDICINE

## 2020-01-29 PROCEDURE — 88305 TISSUE EXAM BY PATHOLOGIST: CPT | Performed by: INTERNAL MEDICINE

## 2020-01-29 PROCEDURE — 45385 COLONOSCOPY W/LESION REMOVAL: CPT | Performed by: INTERNAL MEDICINE

## 2020-01-30 LAB
CYTO UR: NORMAL
LAB AP CASE REPORT: NORMAL
LAB AP CLINICAL INFORMATION: NORMAL
PATH REPORT.FINAL DX SPEC: NORMAL
PATH REPORT.GROSS SPEC: NORMAL

## 2020-02-03 ENCOUNTER — TELEPHONE (OUTPATIENT)
Dept: GASTROENTEROLOGY | Facility: CLINIC | Age: 55
End: 2020-02-03

## 2020-02-03 NOTE — TELEPHONE ENCOUNTER
Message   Received: 3 days ago   Message Contents   Tay Khalil MD  P M[facility]             Let Ms. Sousa know there were 3 adenoma-type polyps.  The patient will need a repeat examination in 3 years.  The biopsies were negative for microscopic colitis.  I would recommend a trial of Xifaxan for irritable bowel syndrome with diarrhea if she still is experiencing loose stool.   Thank you,   ANGELA

## 2020-02-05 ENCOUNTER — OFFICE VISIT (OUTPATIENT)
Dept: FAMILY MEDICINE CLINIC | Facility: CLINIC | Age: 55
End: 2020-02-05

## 2020-02-05 VITALS
WEIGHT: 192 LBS | OXYGEN SATURATION: 96 % | HEART RATE: 86 BPM | TEMPERATURE: 97.6 F | SYSTOLIC BLOOD PRESSURE: 118 MMHG | BODY MASS INDEX: 35.33 KG/M2 | HEIGHT: 62 IN | RESPIRATION RATE: 18 BRPM | DIASTOLIC BLOOD PRESSURE: 86 MMHG

## 2020-02-05 DIAGNOSIS — E53.8 B12 DEFICIENCY: ICD-10-CM

## 2020-02-05 DIAGNOSIS — M25.552 LEFT HIP PAIN: ICD-10-CM

## 2020-02-05 DIAGNOSIS — R10.2 PELVIC PAIN: Primary | ICD-10-CM

## 2020-02-05 PROCEDURE — 96372 THER/PROPH/DIAG INJ SC/IM: CPT | Performed by: FAMILY MEDICINE

## 2020-02-05 PROCEDURE — 99214 OFFICE O/P EST MOD 30 MIN: CPT | Performed by: FAMILY MEDICINE

## 2020-02-05 RX ORDER — CYANOCOBALAMIN 1000 UG/ML
1000 INJECTION, SOLUTION INTRAMUSCULAR; SUBCUTANEOUS
Status: SHIPPED | OUTPATIENT
Start: 2020-02-05

## 2020-02-05 RX ADMIN — CYANOCOBALAMIN 1000 MCG: 1000 INJECTION, SOLUTION INTRAMUSCULAR; SUBCUTANEOUS at 11:52

## 2020-02-05 NOTE — PROGRESS NOTES
Subjective   Jessika Sousa is a 54 y.o. female.   Has had recent lower abd pain. Was seen here and placed on Bactrim and Flagyl . Subsequently went to ER and was thought to have diverticulitis. Has had a colonoscopy since and it showed benign polyps. Pain is getting some better. Has some left hip pain. No relief with Ibuprofen.  Has not seen GYN yet.  Abdominal Pain   This is a recurrent problem. Episode onset: 3 weeks ago. The onset quality is gradual. The problem occurs intermittently. The problem has been gradually improving. The pain is located in the LLQ and RLQ. The pain is at a severity of 1/10. The pain is mild. The quality of the pain is colicky and dull. The abdominal pain does not radiate. Pertinent negatives include no arthralgias, constipation, diarrhea, dysuria, fever, flatus, frequency, myalgias, nausea or weight loss. Nothing aggravates the pain. The pain is relieved by nothing. She has tried nothing for the symptoms. Prior diagnostic workup includes lower endoscopy and CT scan.        The following portions of the patient's history were reviewed and updated as appropriate: allergies, current medications, past family history, past medical history, past social history, past surgical history and problem list.  Vitals:    02/05/20 1119   BP: 118/86   Pulse: 86   Resp: 18   Temp: 97.6 °F (36.4 °C)   SpO2: 96%     Body mass index is 35.12 kg/m².  Review of Systems   Constitutional: Negative.  Negative for activity change, fatigue, fever, unexpected weight gain and unexpected weight loss.   HENT: Negative.  Negative for congestion, sneezing and sore throat.    Eyes: Negative.  Negative for blurred vision, double vision and visual disturbance.   Respiratory: Negative.  Negative for cough, chest tightness, shortness of breath and wheezing.    Cardiovascular: Negative.  Negative for chest pain, palpitations and leg swelling.   Gastrointestinal: Positive for abdominal pain. Negative for abdominal  distention, blood in stool, constipation, diarrhea, flatus and nausea.   Endocrine: Negative.  Negative for cold intolerance and heat intolerance.   Genitourinary: Negative.  Negative for dysuria, frequency, urgency and urinary incontinence.   Musculoskeletal: Negative.  Negative for arthralgias and myalgias.   Skin: Negative.  Negative for rash.   Allergic/Immunologic: Negative.    Neurological: Negative.  Negative for dizziness, syncope, numbness and memory problem.   Hematological: Negative.  Negative for adenopathy.   Psychiatric/Behavioral: Negative.  Negative for suicidal ideas and depressed mood. The patient is not nervous/anxious.    All other systems reviewed and are negative.      Objective   Physical Exam   Constitutional: She is oriented to person, place, and time. She appears well-developed and well-nourished. No distress.   HENT:   Right Ear: External ear normal.   Left Ear: External ear normal.   Nose: Nose normal.   Mouth/Throat: Oropharynx is clear and moist.   Eyes: Pupils are equal, round, and reactive to light. Conjunctivae and EOM are normal.   Neck: Normal range of motion. Neck supple. No thyromegaly present.   Cardiovascular: Normal rate, regular rhythm and normal heart sounds.   No murmur heard.  Pulmonary/Chest: Effort normal and breath sounds normal. No respiratory distress. She has no wheezes.   Abdominal: Soft. Bowel sounds are normal. She exhibits no distension and no mass. There is no tenderness. There is no rebound and no guarding. No hernia.   Musculoskeletal: Normal range of motion. She exhibits no edema or tenderness.   Lymphadenopathy:     She has no cervical adenopathy.   Neurological: She is alert and oriented to person, place, and time. She has normal reflexes.   Skin: Skin is warm and dry. No rash noted. She is not diaphoretic. No erythema. No pallor.   Psychiatric: She has a normal mood and affect. Her behavior is normal. Judgment and thought content normal.   Nursing note and  vitals reviewed.          Assessment/Plan   Jessika was seen today for abdominal pain.    Diagnoses and all orders for this visit:    Pelvic pain  -     US Pelvis Complete; Future    Left hip pain  -     XR Hip With or Without Pelvis 2 - 3 View Left; Future    B12 deficiency  -     cyanocobalamin injection 1,000 mcg      Rec f/u with GYN.

## 2020-02-07 ENCOUNTER — APPOINTMENT (OUTPATIENT)
Dept: ULTRASOUND IMAGING | Facility: HOSPITAL | Age: 55
End: 2020-02-07

## 2020-02-11 ENCOUNTER — HOSPITAL ENCOUNTER (OUTPATIENT)
Dept: ULTRASOUND IMAGING | Facility: HOSPITAL | Age: 55
Discharge: HOME | End: 2020-02-11
Admitting: FAMILY MEDICINE

## 2020-02-11 ENCOUNTER — HOSPITAL ENCOUNTER (OUTPATIENT)
Dept: GENERAL RADIOLOGY | Facility: HOSPITAL | Age: 55
Discharge: HOME | End: 2020-02-11

## 2020-02-11 DIAGNOSIS — R10.2 PELVIC PAIN: ICD-10-CM

## 2020-02-11 DIAGNOSIS — M25.552 LEFT HIP PAIN: ICD-10-CM

## 2020-02-11 PROCEDURE — 76856 US EXAM PELVIC COMPLETE: CPT

## 2020-02-11 PROCEDURE — 73502 X-RAY EXAM HIP UNI 2-3 VIEWS: CPT

## 2020-03-05 ENCOUNTER — CLINICAL SUPPORT (OUTPATIENT)
Dept: FAMILY MEDICINE CLINIC | Facility: CLINIC | Age: 55
End: 2020-03-05

## 2020-03-05 PROCEDURE — 96372 THER/PROPH/DIAG INJ SC/IM: CPT | Performed by: FAMILY MEDICINE

## 2020-03-05 RX ADMIN — CYANOCOBALAMIN 1000 MCG: 1000 INJECTION, SOLUTION INTRAMUSCULAR; SUBCUTANEOUS at 15:10

## 2020-03-10 ENCOUNTER — OFFICE VISIT (OUTPATIENT)
Dept: FAMILY MEDICINE CLINIC | Facility: CLINIC | Age: 55
End: 2020-03-10

## 2020-03-10 VITALS
BODY MASS INDEX: 35.33 KG/M2 | HEART RATE: 78 BPM | SYSTOLIC BLOOD PRESSURE: 122 MMHG | HEIGHT: 62 IN | OXYGEN SATURATION: 98 % | DIASTOLIC BLOOD PRESSURE: 86 MMHG | RESPIRATION RATE: 18 BRPM | WEIGHT: 192 LBS | TEMPERATURE: 97.7 F

## 2020-03-10 DIAGNOSIS — D25.0 SUBMUCOUS LEIOMYOMA OF UTERUS: Primary | ICD-10-CM

## 2020-03-10 DIAGNOSIS — K63.5 POLYP OF ASCENDING COLON, UNSPECIFIED TYPE: ICD-10-CM

## 2020-03-10 PROCEDURE — 99213 OFFICE O/P EST LOW 20 MIN: CPT | Performed by: FAMILY MEDICINE

## 2020-03-10 NOTE — PROGRESS NOTES
Subjective   Jessika Sousa is a 54 y.o. female.     History of Present Illness   Patients abdominal pain has resolved. Had a pelvic US. Has small fibroid and small right ovarian cyst.   Also had a hip xray and colonoscopy. Hips showed some spurring and colonoscopy showed a stable polyp.  The following portions of the patient's history were reviewed and updated as appropriate: allergies, current medications, past family history, past medical history, past social history, past surgical history and problem list.  Vitals:    03/10/20 1125   BP: 122/86   Pulse: 78   Resp: 18   Temp: 97.7 °F (36.5 °C)   SpO2: 98%     Body mass index is 35.12 kg/m².  Review of Systems   Constitutional: Negative.  Negative for activity change, fatigue, fever, unexpected weight gain and unexpected weight loss.   HENT: Negative.  Negative for congestion, sneezing and sore throat.    Eyes: Negative.  Negative for blurred vision, double vision and visual disturbance.   Respiratory: Negative.  Negative for cough, chest tightness, shortness of breath and wheezing.    Cardiovascular: Negative.  Negative for chest pain, palpitations and leg swelling.   Gastrointestinal: Negative.  Negative for abdominal distention, abdominal pain, blood in stool, constipation, diarrhea and nausea.   Endocrine: Negative.  Negative for cold intolerance and heat intolerance.   Genitourinary: Negative.  Negative for dysuria, frequency, urgency and urinary incontinence.   Musculoskeletal: Negative.  Negative for arthralgias and myalgias.   Skin: Negative.  Negative for rash.   Allergic/Immunologic: Negative.    Neurological: Negative.  Negative for dizziness, syncope, numbness and memory problem.   Hematological: Negative.  Negative for adenopathy.   Psychiatric/Behavioral: Negative.  Negative for suicidal ideas and depressed mood. The patient is not nervous/anxious.    All other systems reviewed and are negative.      Objective   Physical Exam   Constitutional: She  is oriented to person, place, and time. She appears well-developed and well-nourished. No distress.   HENT:   Right Ear: External ear normal.   Left Ear: External ear normal.   Nose: Nose normal.   Mouth/Throat: Oropharynx is clear and moist.   Eyes: Pupils are equal, round, and reactive to light. Conjunctivae and EOM are normal.   Neck: Normal range of motion. Neck supple. No thyromegaly present.   Cardiovascular: Normal rate, regular rhythm and normal heart sounds.   No murmur heard.  Pulmonary/Chest: Effort normal and breath sounds normal. No respiratory distress. She has no wheezes.   Abdominal: Soft. Bowel sounds are normal. She exhibits no distension and no mass. There is no tenderness. There is no rebound and no guarding. No hernia.   Musculoskeletal: Normal range of motion. She exhibits no edema or tenderness.   Lymphadenopathy:     She has no cervical adenopathy.   Neurological: She is alert and oriented to person, place, and time. She has normal reflexes.   Skin: Skin is warm and dry. No rash noted. She is not diaphoretic. No erythema. No pallor.   Psychiatric: She has a normal mood and affect. Her behavior is normal. Judgment and thought content normal.   Nursing note and vitals reviewed.          Assessment/Plan   Jessika was seen today for abdominal pain.    Diagnoses and all orders for this visit:    Submucous leiomyoma of uterus    Polyp of ascending colon, unspecified type    Rec f/u with GYN if symptoms worsen.  Rec. Colon cancer surveillance.

## 2020-07-22 ENCOUNTER — TELEPHONE (OUTPATIENT)
Dept: FAMILY MEDICINE CLINIC | Facility: CLINIC | Age: 55
End: 2020-07-22

## 2020-07-22 NOTE — TELEPHONE ENCOUNTER
----- Message from Kajal Fairbanks DO sent at 7/22/2020 10:59 AM EDT -----  Regarding: FW: CALL PATIENT  Contact: [phone]  She can resume getting her B12 monthly and walk in  ----- Message -----  From: Perlita Álvarez MA  Sent: 7/22/2020  10:54 AM EDT  To: Kajal Fairbanks DO  Subject: FW: CALL PATIENT                                     ----- Message -----  From: Amisha Valdez RegSched Rep  Sent: 7/22/2020  10:44 AM EDT  To: Perlita Álvarez MA  Subject: CALL PATIENT                                     PATIENT STATES SHE WAS GETTING MONTHLY B12 SHOTS BEFORE COVID. HER LAST B12 SHOT WAS 3/5. CAN SHE WALK IN AND RESUME INJECTIONS OR DO WE NEED TO SET UP AN APT?

## 2020-09-10 ENCOUNTER — CLINICAL SUPPORT (OUTPATIENT)
Dept: FAMILY MEDICINE CLINIC | Facility: CLINIC | Age: 55
End: 2020-09-10

## 2020-09-10 PROCEDURE — 96372 THER/PROPH/DIAG INJ SC/IM: CPT | Performed by: FAMILY MEDICINE

## 2020-09-10 RX ADMIN — CYANOCOBALAMIN 1000 MCG: 1000 INJECTION, SOLUTION INTRAMUSCULAR; SUBCUTANEOUS at 10:41

## 2020-11-05 ENCOUNTER — OFFICE VISIT (OUTPATIENT)
Dept: FAMILY MEDICINE CLINIC | Facility: CLINIC | Age: 55
End: 2020-11-05

## 2020-11-05 VITALS
HEIGHT: 62 IN | DIASTOLIC BLOOD PRESSURE: 70 MMHG | SYSTOLIC BLOOD PRESSURE: 110 MMHG | OXYGEN SATURATION: 99 % | HEART RATE: 73 BPM | WEIGHT: 202 LBS | TEMPERATURE: 98.6 F | BODY MASS INDEX: 37.17 KG/M2

## 2020-11-05 DIAGNOSIS — J01.00 ACUTE MAXILLARY SINUSITIS, RECURRENCE NOT SPECIFIED: Primary | ICD-10-CM

## 2020-11-05 DIAGNOSIS — E53.8 B12 DEFICIENCY: ICD-10-CM

## 2020-11-05 PROCEDURE — 99213 OFFICE O/P EST LOW 20 MIN: CPT | Performed by: NURSE PRACTITIONER

## 2020-11-05 RX ORDER — AMOXICILLIN AND CLAVULANATE POTASSIUM 875; 125 MG/1; MG/1
1 TABLET, FILM COATED ORAL EVERY 12 HOURS SCHEDULED
Qty: 20 TABLET | Refills: 0 | Status: SHIPPED | OUTPATIENT
Start: 2020-11-05 | End: 2021-05-18

## 2020-11-05 NOTE — PROGRESS NOTES
Subjective   Jessika Sousa is a 54 y.o. female.     History of Present Illness Complains of frontal and max sinus pain and pressure for one month that was intermittent and now constant. No discharge. Pain can be severe. No fever or change in vision. Treated with Allegra. No real allergy symptoms. No sore throat or PND. No otalgia. No cough.     Also need vit B12 inj.    Outpatient Encounter Medications as of 11/5/2020   Medication Sig Dispense Refill   • Prasterone (INTRAROSA) 6.5 MG insert Intrarosa 6.5 mg vaginal insert   Insert 1 vaginal insert every day by vaginal route for 28 days.     • amoxicillin-clavulanate (Augmentin) 875-125 MG per tablet Take 1 tablet by mouth Every 12 (Twelve) Hours. 20 tablet 0     Facility-Administered Encounter Medications as of 11/5/2020   Medication Dose Route Frequency Provider Last Rate Last Dose   • cyanocobalamin injection 1,000 mcg  1,000 mcg Intramuscular Q28 Days Fairbanks, Kajal A, DO   1,000 mcg at 09/10/20 1041   • cyanocobalamin injection 1,000 mcg  1,000 mcg Intramuscular Q28 Days Fairbanks, Kajal A, DO   1,000 mcg at 01/10/20 1242   • cyanocobalamin injection 1,000 mcg  1,000 mcg Intramuscular Q28 Days Fairbanks, Kajal A, DO   1,000 mcg at 03/05/20 1510       The following portions of the patient's history were reviewed and updated as appropriate: allergies, current medications, past family history, past medical history, past social history, past surgical history and problem list.    Review of Systems   Constitutional: Negative for appetite change, fever, unexpected weight gain and unexpected weight loss.   HENT: Positive for sinus pressure. Negative for congestion, nosebleeds, sore throat and trouble swallowing.    Eyes: Negative for visual disturbance.   Respiratory: Negative for cough, shortness of breath and wheezing.    Cardiovascular: Negative for chest pain, palpitations and leg swelling.   Gastrointestinal: Negative for abdominal pain, blood in stool, constipation,  "diarrhea, nausea and vomiting.   Endocrine: Negative for polydipsia, polyphagia and polyuria.   Genitourinary: Negative for dysuria, frequency and hematuria.   Musculoskeletal: Negative for arthralgias, joint swelling and myalgias.   Skin: Negative for rash.   Neurological: Negative for dizziness, seizures, syncope and numbness.   Hematological: Negative for adenopathy. Does not bruise/bleed easily.   Psychiatric/Behavioral: Negative for behavioral problems, sleep disturbance and depressed mood. The patient is not nervous/anxious.        Objective     Visit Vitals  /70   Pulse 73   Temp 98.6 °F (37 °C)   Ht 157.5 cm (62\")   Wt 91.6 kg (202 lb)   LMP  (LMP Unknown)   SpO2 99%   BMI 36.95 kg/m²       Physical Exam  Vitals signs and nursing note reviewed.   Constitutional:       General: She is not in acute distress.     Appearance: She is well-developed.   HENT:      Head: Normocephalic and atraumatic.      Right Ear: External ear normal. No middle ear effusion.      Left Ear: External ear normal.  No middle ear effusion.      Nose: Nose normal.   Eyes:      General: No scleral icterus.        Right eye: No discharge.         Left eye: No discharge.      Conjunctiva/sclera: Conjunctivae normal.      Pupils: Pupils are equal, round, and reactive to light.   Neck:      Musculoskeletal: Neck supple.   Cardiovascular:      Rate and Rhythm: Normal rate and regular rhythm.   Pulmonary:      Effort: Pulmonary effort is normal.   Musculoskeletal:         General: No deformity.   Skin:     General: Skin is warm and dry.      Findings: No rash.   Neurological:      Mental Status: She is alert and oriented to person, place, and time.      Motor: No abnormal muscle tone.      Coordination: Coordination normal.   Psychiatric:         Behavior: Behavior normal.         Thought Content: Thought content normal.         Judgment: Judgment normal.           Assessment/Plan   Diagnoses and all orders for this visit:    1. Acute " maxillary sinusitis, recurrence not specified (Primary)  -     amoxicillin-clavulanate (Augmentin) 875-125 MG per tablet; Take 1 tablet by mouth Every 12 (Twelve) Hours.  Dispense: 20 tablet; Refill: 0    2. B12 deficiency      Take OTC sudaphed, allegra, and Mucinex. Follow up 4 weeks symptoms persist. Drink plenty of fluids.  May need steroids.  Dicussed possible sinus polyp.  Vit B12 inj today.  Follow up wit PCP.

## 2021-03-12 ENCOUNTER — CLINICAL SUPPORT (OUTPATIENT)
Dept: FAMILY MEDICINE CLINIC | Facility: CLINIC | Age: 56
End: 2021-03-12

## 2021-03-12 PROCEDURE — 96372 THER/PROPH/DIAG INJ SC/IM: CPT | Performed by: FAMILY MEDICINE

## 2021-03-12 RX ADMIN — CYANOCOBALAMIN 1000 MCG: 1000 INJECTION, SOLUTION INTRAMUSCULAR; SUBCUTANEOUS at 11:24

## 2021-03-26 ENCOUNTER — IMMUNIZATION (OUTPATIENT)
Dept: VACCINE CLINIC | Facility: HOSPITAL | Age: 56
End: 2021-03-26

## 2021-03-26 PROCEDURE — 91300 HC SARSCOV02 VAC 30MCG/0.3ML IM: CPT | Performed by: INTERNAL MEDICINE

## 2021-03-26 PROCEDURE — 0001A: CPT | Performed by: INTERNAL MEDICINE

## 2021-04-16 ENCOUNTER — IMMUNIZATION (OUTPATIENT)
Dept: VACCINE CLINIC | Facility: HOSPITAL | Age: 56
End: 2021-04-16

## 2021-04-16 PROCEDURE — 91300 HC SARSCOV02 VAC 30MCG/0.3ML IM: CPT | Performed by: INTERNAL MEDICINE

## 2021-04-16 PROCEDURE — 0002A: CPT | Performed by: INTERNAL MEDICINE

## 2021-04-21 ENCOUNTER — CLINICAL SUPPORT (OUTPATIENT)
Dept: FAMILY MEDICINE CLINIC | Facility: CLINIC | Age: 56
End: 2021-04-21

## 2021-04-21 DIAGNOSIS — E53.8 VITAMIN B 12 DEFICIENCY: Primary | ICD-10-CM

## 2021-04-21 PROCEDURE — 96372 THER/PROPH/DIAG INJ SC/IM: CPT | Performed by: FAMILY MEDICINE

## 2021-04-21 RX ADMIN — CYANOCOBALAMIN 1000 MCG: 1000 INJECTION, SOLUTION INTRAMUSCULAR; SUBCUTANEOUS at 11:52

## 2021-05-18 ENCOUNTER — OFFICE VISIT (OUTPATIENT)
Dept: FAMILY MEDICINE CLINIC | Facility: CLINIC | Age: 56
End: 2021-05-18

## 2021-05-18 VITALS
HEIGHT: 62 IN | BODY MASS INDEX: 36.99 KG/M2 | TEMPERATURE: 98.6 F | HEART RATE: 72 BPM | RESPIRATION RATE: 16 BRPM | SYSTOLIC BLOOD PRESSURE: 120 MMHG | OXYGEN SATURATION: 98 % | WEIGHT: 201 LBS | DIASTOLIC BLOOD PRESSURE: 80 MMHG

## 2021-05-18 DIAGNOSIS — Z00.00 HEALTH CARE MAINTENANCE: Primary | ICD-10-CM

## 2021-05-18 DIAGNOSIS — M54.6 ACUTE RIGHT-SIDED THORACIC BACK PAIN: ICD-10-CM

## 2021-05-18 DIAGNOSIS — M25.542 JOINT PAIN IN BOTH HANDS: ICD-10-CM

## 2021-05-18 DIAGNOSIS — M25.541 JOINT PAIN IN BOTH HANDS: ICD-10-CM

## 2021-05-18 LAB
ALBUMIN SERPL-MCNC: 4.5 G/DL (ref 3.5–5.2)
ALBUMIN/GLOB SERPL: 1.8 G/DL
ALP SERPL-CCNC: 53 U/L (ref 39–117)
ALT SERPL W P-5'-P-CCNC: 25 U/L (ref 1–33)
AMYLASE SERPL-CCNC: 45 U/L (ref 28–100)
ANION GAP SERPL CALCULATED.3IONS-SCNC: 9.9 MMOL/L (ref 5–15)
AST SERPL-CCNC: 24 U/L (ref 1–32)
BASOPHILS # BLD AUTO: 0.05 10*3/MM3 (ref 0–0.2)
BASOPHILS NFR BLD AUTO: 0.8 % (ref 0–1.5)
BILIRUB SERPL-MCNC: 0.4 MG/DL (ref 0–1.2)
BUN SERPL-MCNC: 8 MG/DL (ref 6–20)
BUN/CREAT SERPL: 12.9 (ref 7–25)
CALCIUM SPEC-SCNC: 9.1 MG/DL (ref 8.6–10.5)
CHLORIDE SERPL-SCNC: 107 MMOL/L (ref 98–107)
CHOLEST SERPL-MCNC: 213 MG/DL (ref 0–200)
CHROMATIN AB SERPL-ACNC: <10 IU/ML (ref 0–14)
CO2 SERPL-SCNC: 23.1 MMOL/L (ref 22–29)
CREAT SERPL-MCNC: 0.62 MG/DL (ref 0.57–1)
DEPRECATED RDW RBC AUTO: 43.6 FL (ref 37–54)
EOSINOPHIL # BLD AUTO: 0.13 10*3/MM3 (ref 0–0.4)
EOSINOPHIL NFR BLD AUTO: 2 % (ref 0.3–6.2)
ERYTHROCYTE [DISTWIDTH] IN BLOOD BY AUTOMATED COUNT: 13.6 % (ref 12.3–15.4)
ERYTHROCYTE [SEDIMENTATION RATE] IN BLOOD: 12 MM/HR (ref 0–30)
GFR SERPL CREATININE-BSD FRML MDRD: 100 ML/MIN/1.73
GLOBULIN UR ELPH-MCNC: 2.5 GM/DL
GLUCOSE SERPL-MCNC: 75 MG/DL (ref 65–99)
HCT VFR BLD AUTO: 46 % (ref 34–46.6)
HDLC SERPL-MCNC: 48 MG/DL (ref 40–60)
HGB BLD-MCNC: 15.3 G/DL (ref 12–15.9)
IMM GRANULOCYTES # BLD AUTO: 0.02 10*3/MM3 (ref 0–0.05)
IMM GRANULOCYTES NFR BLD AUTO: 0.3 % (ref 0–0.5)
LDLC SERPL CALC-MCNC: 141 MG/DL (ref 0–100)
LDLC/HDLC SERPL: 2.88 {RATIO}
LIPASE SERPL-CCNC: 35 U/L (ref 13–60)
LYMPHOCYTES # BLD AUTO: 2.01 10*3/MM3 (ref 0.7–3.1)
LYMPHOCYTES NFR BLD AUTO: 30.5 % (ref 19.6–45.3)
MCH RBC QN AUTO: 29.3 PG (ref 26.6–33)
MCHC RBC AUTO-ENTMCNC: 33.3 G/DL (ref 31.5–35.7)
MCV RBC AUTO: 88.1 FL (ref 79–97)
MONOCYTES # BLD AUTO: 0.48 10*3/MM3 (ref 0.1–0.9)
MONOCYTES NFR BLD AUTO: 7.3 % (ref 5–12)
NEUTROPHILS NFR BLD AUTO: 3.9 10*3/MM3 (ref 1.7–7)
NEUTROPHILS NFR BLD AUTO: 59.1 % (ref 42.7–76)
NRBC BLD AUTO-RTO: 0 /100 WBC (ref 0–0.2)
PLATELET # BLD AUTO: 277 10*3/MM3 (ref 140–450)
PMV BLD AUTO: 10.4 FL (ref 6–12)
POTASSIUM SERPL-SCNC: 3.9 MMOL/L (ref 3.5–5.2)
PROT SERPL-MCNC: 7 G/DL (ref 6–8.5)
RBC # BLD AUTO: 5.22 10*6/MM3 (ref 3.77–5.28)
SODIUM SERPL-SCNC: 140 MMOL/L (ref 136–145)
TRIGL SERPL-MCNC: 134 MG/DL (ref 0–150)
TSH SERPL DL<=0.05 MIU/L-ACNC: 1.32 UIU/ML (ref 0.27–4.2)
VLDLC SERPL-MCNC: 24 MG/DL (ref 5–40)
WBC # BLD AUTO: 6.59 10*3/MM3 (ref 3.4–10.8)

## 2021-05-18 PROCEDURE — 82150 ASSAY OF AMYLASE: CPT | Performed by: FAMILY MEDICINE

## 2021-05-18 PROCEDURE — 82306 VITAMIN D 25 HYDROXY: CPT | Performed by: FAMILY MEDICINE

## 2021-05-18 PROCEDURE — 80053 COMPREHEN METABOLIC PANEL: CPT | Performed by: FAMILY MEDICINE

## 2021-05-18 PROCEDURE — 99396 PREV VISIT EST AGE 40-64: CPT | Performed by: FAMILY MEDICINE

## 2021-05-18 PROCEDURE — 85652 RBC SED RATE AUTOMATED: CPT | Performed by: FAMILY MEDICINE

## 2021-05-18 PROCEDURE — 85025 COMPLETE CBC W/AUTO DIFF WBC: CPT | Performed by: FAMILY MEDICINE

## 2021-05-18 PROCEDURE — 84443 ASSAY THYROID STIM HORMONE: CPT | Performed by: FAMILY MEDICINE

## 2021-05-18 PROCEDURE — 80061 LIPID PANEL: CPT | Performed by: FAMILY MEDICINE

## 2021-05-18 PROCEDURE — 82607 VITAMIN B-12: CPT | Performed by: FAMILY MEDICINE

## 2021-05-18 PROCEDURE — 86431 RHEUMATOID FACTOR QUANT: CPT | Performed by: FAMILY MEDICINE

## 2021-05-18 PROCEDURE — 83690 ASSAY OF LIPASE: CPT | Performed by: FAMILY MEDICINE

## 2021-05-18 PROCEDURE — 86038 ANTINUCLEAR ANTIBODIES: CPT | Performed by: FAMILY MEDICINE

## 2021-05-18 NOTE — PROGRESS NOTES
"Subjective   Jessika Sousa is a 55 y.o. female and is here for a comprehensive physical exam. The patient reports thoracic pain. Pain comes and goes, radiates from mid back to abdomen. Not related to eating. Has had for > 6 months. Denies injury. Denies pain or nausea with eating.     C/O joint pain in thumbs and fingers. No OTC treatment.    Last health maintenance visit was 1 year . Overall they feel their health is good . Lives with spouse  Occupation is unemployed. Patient's diet is in general, a \"healthy\" diet  . Exercises regularly regularly 4 times weekly yoga and cardio . Tobacco use Never used. Alcohol use is none . Illicit drug no history of illicit drug use    Patient Care Team:  Kajal Fairbanks DO as PCP - General (Family Medicine)  Keya Urbano APRN as Nurse Practitioner (Obstetrics and Gynecology)     Screening Tests  Vision Impairment. Uses Glasses/Contacts   Hearingnormal  Dental: Brushes does teeth twice a day . Dental exam every six months?yes  Mammogram up to date yes  Pap smear yes  Colonoscopy yes  Dexa Scan no    Do you take any herbs or supplements that were not prescribed by a doctor? yes Vit D  Are you taking calcium supplements? no  Are you taking aspirin daily? no  Family history of ovarian cancer? no  Family history of breast cancer? Yes mother  FH of endometrial cancer? no  FH of cervical cancer? no  FH of colon cancer? no       History:  LMP: No LMP recorded (lmp unknown). Patient is postmenopausal.  Menopause at 50 years  Last pap date: 2020  Abnormal pap? no  : 2  Para: 2    Immunization History  Tdap? no  HPV? not applicable  Pneumonia? not applicable  Shingles? no    The following portions of the patient's history were reviewed and updated as appropriate: allergies, current medications, past family history, past medical history, past social history, past surgical history and problem list.      Current Outpatient Medications:   •  Prasterone (INTRAROSA) 6.5 MG " insert, Intrarosa 6.5 mg vaginal insert  Insert 1 vaginal insert every day by vaginal route for 28 days., Disp: , Rfl:     Current Facility-Administered Medications:   •  cyanocobalamin injection 1,000 mcg, 1,000 mcg, Intramuscular, Q28 Days, Fairbanks, Kajal A, DO, 1,000 mcg at 09/10/20 1041  •  cyanocobalamin injection 1,000 mcg, 1,000 mcg, Intramuscular, Q28 Days, Fairbanks, Kajal A, DO, 1,000 mcg at 01/10/20 1242  •  cyanocobalamin injection 1,000 mcg, 1,000 mcg, Intramuscular, Q28 Days, Fairbanks, Kajal A, DO, 1,000 mcg at 04/21/21 1152    Past Medical History:   Diagnosis Date   • Irritable bowel syndrome        Family History   Problem Relation Age of Onset   • Hypertension Mother    • Cancer Mother    • Ulcers Father    • Stroke Sister    • No Known Problems Maternal Grandmother    • No Known Problems Paternal Grandmother    • Pancreatitis Sister        Past Surgical History:   Procedure Laterality Date   • FOOT SURGERY      Bilateral Foot Spur Remove        Social History     Socioeconomic History   • Marital status:      Spouse name: Not on file   • Number of children: Not on file   • Years of education: Not on file   • Highest education level: Not on file   Tobacco Use   • Smoking status: Never Smoker   • Smokeless tobacco: Never Used   Vaping Use   • Vaping Use: Never used   Substance and Sexual Activity   • Alcohol use: Yes     Comment: OCC   • Drug use: No   • Sexual activity: Defer       Review of Systems  Do you have pain that bothers you in your daily life? no  Review of Systems   Constitutional: Negative.  Negative for activity change, fatigue, fever and unexpected weight change.   HENT: Negative.  Negative for congestion, sneezing and sore throat.    Eyes: Negative.  Negative for visual disturbance.   Respiratory: Negative.  Negative for cough, chest tightness, shortness of breath and wheezing.    Cardiovascular: Negative.  Negative for chest pain, palpitations and leg swelling.   Gastrointestinal:  Negative.  Negative for abdominal distention, abdominal pain, blood in stool, constipation, diarrhea and nausea.   Endocrine: Negative.  Negative for cold intolerance and heat intolerance.   Genitourinary: Negative.  Negative for dysuria, frequency and urgency.   Musculoskeletal: Negative.  Negative for arthralgias and myalgias.   Skin: Negative.  Negative for rash.   Allergic/Immunologic: Negative.    Neurological: Negative.  Negative for dizziness, syncope and numbness.   Hematological: Negative.  Negative for adenopathy.   Psychiatric/Behavioral: Negative.  Negative for suicidal ideas. The patient is not nervous/anxious.        Objective   Physical Exam  Vitals and nursing note reviewed.   Constitutional:       General: She is not in acute distress.     Appearance: She is well-developed. She is not diaphoretic.   HENT:      Right Ear: External ear normal.      Left Ear: External ear normal.      Nose: Nose normal.   Eyes:      Conjunctiva/sclera: Conjunctivae normal.      Pupils: Pupils are equal, round, and reactive to light.   Neck:      Thyroid: No thyromegaly.   Cardiovascular:      Rate and Rhythm: Normal rate and regular rhythm.      Heart sounds: Normal heart sounds. No murmur heard.     Pulmonary:      Effort: Pulmonary effort is normal. No respiratory distress.      Breath sounds: Normal breath sounds. No wheezing.   Abdominal:      General: Bowel sounds are normal. There is no distension.      Palpations: Abdomen is soft. There is no mass.      Tenderness: There is no abdominal tenderness. There is no guarding or rebound.      Hernia: No hernia is present.   Musculoskeletal:         General: No tenderness. Normal range of motion.      Cervical back: Normal range of motion and neck supple.   Lymphadenopathy:      Cervical: No cervical adenopathy.   Skin:     General: Skin is warm and dry.      Coloration: Skin is not pale.      Findings: No erythema or rash.   Neurological:      Mental Status: She is  "alert and oriented to person, place, and time.      Deep Tendon Reflexes: Reflexes are normal and symmetric.   Psychiatric:         Behavior: Behavior normal.         Thought Content: Thought content normal.         Judgment: Judgment normal.       /80 (BP Location: Left arm, Patient Position: Sitting, Cuff Size: Adult)   Pulse 72   Temp 98.6 °F (37 °C) (Temporal)   Resp 16   Ht 157.5 cm (62\")   Wt 91.2 kg (201 lb)   LMP  (LMP Unknown)   SpO2 98%   BMI 36.76 kg/m²    Body mass index is 36.76 kg/m².      Diagnoses and all orders for this visit:    1. Health care maintenance (Primary)  -     CBC & Differential  -     Comprehensive Metabolic Panel  -     Lipid Panel  -     TSH  -     Vitamin B12  -     Vitamin D 25 Hydroxy    2. Acute right-sided thoracic back pain  -     Amylase  -     Lipase  -     US Abdomen Complete; Future  -     XR Chest 2 View; Future    3. Joint pain in both hands  -     SIRENA  -     Rheumatoid Factor  -     Sedimentation Rate          Patient Counseling:   --BMI: Discussed that it is not acceptable and appropriate                 Plan.  --Nutrition: Stressed importance of moderation in sodium/caffeine intake, saturated fat and cholesterol, caloric balance, sufficient intake of fresh fruits, vegetables, fiber, calcium, iron, and 1 mg of folate supplement per day (for females capable of pregnancy).  ---Exercise: Stressed the importance of regular exercise.   --Substance Abuse: Discussed cessation/primary prevention of tobacco, alcohol, or other drug use; driving or other dangerous activities under the influence; availability of treatment for abuse.    --Sexuality: Discussed sexually transmitted diseases, partner selection, use of condoms, avoidance of unintended pregnancy  and contraceptive alternatives.   --Injury prevention: Discussed safety belts, safety helmets, smoke detector, smoking near bedding or upholstery.   --Dental health: Discussed importance of regular tooth brushing, " flossing, and dental visits.  --Immunizations reviewed.  --Discussed benefits of mammogram  --Discussed benefits of screening colonoscopy.  --After hours service discussed with patient    Discussed the nature of the disease including, risks, complications, implications, management, safe and proper use of medications. Encouraged therapeutic lifestyle changes including healthy diet with plenty of fruits and vegetables, daily exercise, medication compliance, and keeping scheduled follow up appointments with me and any other providers. Encouraged patient to have appointment for complete physical, fasting labs, appropriate screenings, and immunizations on an annual basis.       Follow up as needed for acute illness

## 2021-05-19 LAB
25(OH)D3 SERPL-MCNC: 21.9 NG/ML
ANA SER QL: NEGATIVE
VIT B12 BLD-MCNC: <150 PG/ML (ref 211–946)

## 2021-06-04 ENCOUNTER — CLINICAL SUPPORT (OUTPATIENT)
Dept: FAMILY MEDICINE CLINIC | Facility: CLINIC | Age: 56
End: 2021-06-04

## 2021-06-04 DIAGNOSIS — E53.8 B12 DEFICIENCY: Primary | ICD-10-CM

## 2021-06-04 PROCEDURE — 96372 THER/PROPH/DIAG INJ SC/IM: CPT | Performed by: FAMILY MEDICINE

## 2021-06-04 RX ORDER — CYANOCOBALAMIN 1000 UG/ML
1000 INJECTION, SOLUTION INTRAMUSCULAR; SUBCUTANEOUS
Status: SHIPPED | OUTPATIENT
Start: 2021-06-04

## 2021-06-04 RX ADMIN — CYANOCOBALAMIN 1000 MCG: 1000 INJECTION, SOLUTION INTRAMUSCULAR; SUBCUTANEOUS at 12:03

## 2021-07-09 ENCOUNTER — LAB (OUTPATIENT)
Dept: FAMILY MEDICINE CLINIC | Facility: CLINIC | Age: 56
End: 2021-07-09

## 2021-07-09 PROCEDURE — 96372 THER/PROPH/DIAG INJ SC/IM: CPT | Performed by: FAMILY MEDICINE

## 2021-07-09 RX ADMIN — CYANOCOBALAMIN 1000 MCG: 1000 INJECTION, SOLUTION INTRAMUSCULAR; SUBCUTANEOUS at 12:08

## 2021-08-10 ENCOUNTER — CLINICAL SUPPORT (OUTPATIENT)
Dept: FAMILY MEDICINE CLINIC | Facility: CLINIC | Age: 56
End: 2021-08-10

## 2021-08-10 DIAGNOSIS — D50.9 IRON DEFICIENCY ANEMIA, UNSPECIFIED IRON DEFICIENCY ANEMIA TYPE: ICD-10-CM

## 2021-08-10 PROCEDURE — 96372 THER/PROPH/DIAG INJ SC/IM: CPT | Performed by: FAMILY MEDICINE

## 2021-08-10 RX ADMIN — CYANOCOBALAMIN 1000 MCG: 1000 INJECTION, SOLUTION INTRAMUSCULAR; SUBCUTANEOUS at 10:53

## 2021-09-17 ENCOUNTER — CLINICAL SUPPORT (OUTPATIENT)
Dept: FAMILY MEDICINE CLINIC | Facility: CLINIC | Age: 56
End: 2021-09-17

## 2021-09-17 PROCEDURE — 96372 THER/PROPH/DIAG INJ SC/IM: CPT | Performed by: FAMILY MEDICINE

## 2021-09-17 RX ADMIN — CYANOCOBALAMIN 1000 MCG: 1000 INJECTION, SOLUTION INTRAMUSCULAR; SUBCUTANEOUS at 11:27

## 2021-11-01 ENCOUNTER — CLINICAL SUPPORT (OUTPATIENT)
Dept: FAMILY MEDICINE CLINIC | Facility: CLINIC | Age: 56
End: 2021-11-01

## 2021-11-01 DIAGNOSIS — E53.8 VITAMIN B 12 DEFICIENCY: Primary | ICD-10-CM

## 2021-11-01 PROCEDURE — 96372 THER/PROPH/DIAG INJ SC/IM: CPT | Performed by: FAMILY MEDICINE

## 2021-11-01 RX ADMIN — CYANOCOBALAMIN 1000 MCG: 1000 INJECTION, SOLUTION INTRAMUSCULAR; SUBCUTANEOUS at 11:20

## 2021-11-10 ENCOUNTER — OFFICE VISIT (OUTPATIENT)
Dept: FAMILY MEDICINE CLINIC | Facility: CLINIC | Age: 56
End: 2021-11-10

## 2021-11-10 VITALS
HEIGHT: 62 IN | DIASTOLIC BLOOD PRESSURE: 78 MMHG | SYSTOLIC BLOOD PRESSURE: 122 MMHG | OXYGEN SATURATION: 98 % | HEART RATE: 90 BPM | TEMPERATURE: 98.6 F | WEIGHT: 202 LBS | BODY MASS INDEX: 37.17 KG/M2 | RESPIRATION RATE: 18 BRPM

## 2021-11-10 DIAGNOSIS — F33.0 MILD EPISODE OF RECURRENT DEPRESSIVE DISORDER (HCC): Primary | ICD-10-CM

## 2021-11-10 PROCEDURE — 90686 IIV4 VACC NO PRSV 0.5 ML IM: CPT | Performed by: FAMILY MEDICINE

## 2021-11-10 PROCEDURE — 99213 OFFICE O/P EST LOW 20 MIN: CPT | Performed by: FAMILY MEDICINE

## 2021-11-10 PROCEDURE — 90471 IMMUNIZATION ADMIN: CPT | Performed by: FAMILY MEDICINE

## 2021-11-10 RX ORDER — CITALOPRAM 20 MG/1
TABLET ORAL
Qty: 30 TABLET | Refills: 0 | Status: SHIPPED | OUTPATIENT
Start: 2021-11-10 | End: 2021-12-06 | Stop reason: SDUPTHER

## 2021-11-10 NOTE — PROGRESS NOTES
"Chief Complaint  Menopause    Subjective          Jessika Sousa presents to [facility] for   Has had increased irritability, emotional changes, unhappy, jaw clinching, sleep sporadic. No SI.     Depression  Visit Type: initial  Onset of symptoms: 1 to 6 months ago  Progression since onset: gradually worsening  Patient presents with the following symptoms: anhedonia, depressed mood, excessive worry, fatigue, insomnia, irritability, malaise, nervousness/anxiety, panic and weight gain.  Patient is not experiencing: palpitations, shortness of breath, suicidal ideas, suicidal planning, thoughts of death and weight loss.  Frequency of symptoms: occasionally   Severity: moderate   Exacerbated by: menopause.  Sleep quality: fair  Nighttime awakenings: occasional        Objective   Vital Signs:   /78   Pulse 90   Temp 98.6 °F (37 °C)   Resp 18   Ht 157.5 cm (62\")   Wt 91.6 kg (202 lb)   SpO2 98%   BMI 36.95 kg/m²       Review of Systems   Constitutional: Positive for irritability and weight gain. Negative for activity change, fatigue, fever, unexpected weight change and weight loss.   HENT: Negative.  Negative for congestion, sneezing and sore throat.    Eyes: Negative.  Negative for visual disturbance.   Respiratory: Negative.  Negative for cough, chest tightness, shortness of breath and wheezing.    Cardiovascular: Negative.  Negative for chest pain, palpitations and leg swelling.   Gastrointestinal: Negative.  Negative for abdominal distention, abdominal pain, blood in stool, constipation, diarrhea and nausea.   Endocrine: Negative.  Negative for cold intolerance and heat intolerance.   Genitourinary: Negative.  Negative for dysuria, frequency and urgency.   Musculoskeletal: Negative.  Negative for arthralgias and myalgias.   Skin: Negative.  Negative for rash.   Allergic/Immunologic: Negative.    Neurological: Negative.  Negative for dizziness, syncope and numbness. "   Hematological: Negative.  Negative for adenopathy.   Psychiatric/Behavioral: Negative for suicidal ideas. The patient is nervous/anxious and has insomnia.      Physical Exam  Vitals and nursing note reviewed.   Constitutional:       General: She is not in acute distress.     Appearance: She is well-developed. She is not diaphoretic.   HENT:      Right Ear: External ear normal.      Left Ear: External ear normal.      Nose: Nose normal.   Eyes:      Conjunctiva/sclera: Conjunctivae normal.      Pupils: Pupils are equal, round, and reactive to light.   Neck:      Thyroid: No thyromegaly.   Cardiovascular:      Rate and Rhythm: Normal rate and regular rhythm.      Heart sounds: Normal heart sounds. No murmur heard.      Pulmonary:      Effort: Pulmonary effort is normal. No respiratory distress.      Breath sounds: Normal breath sounds. No wheezing.   Abdominal:      General: Bowel sounds are normal. There is no distension.      Palpations: Abdomen is soft. There is no mass.      Tenderness: There is no abdominal tenderness. There is no guarding or rebound.      Hernia: No hernia is present.   Musculoskeletal:         General: No tenderness. Normal range of motion.      Cervical back: Normal range of motion and neck supple.   Lymphadenopathy:      Cervical: No cervical adenopathy.   Skin:     General: Skin is warm and dry.      Coloration: Skin is not pale.      Findings: No erythema or rash.   Neurological:      Mental Status: She is alert and oriented to person, place, and time.      Deep Tendon Reflexes: Reflexes are normal and symmetric.   Psychiatric:         Behavior: Behavior normal.         Thought Content: Thought content normal.         Judgment: Judgment normal.        Result Review :                 Assessment and Plan    Problem List Items Addressed This Visit     None      Visit Diagnoses     Mild episode of recurrent depressive disorder (HCC)    -  Primary    Relevant Medications    citalopram (CeleXA)  20 MG tablet          Follow Up   Return in about 26 days (around 12/6/2021).  Patient was given instructions and counseling regarding her condition or for health maintenance advice. Please see specific information pulled into the AVS if appropriate.

## 2021-12-06 ENCOUNTER — OFFICE VISIT (OUTPATIENT)
Dept: FAMILY MEDICINE CLINIC | Facility: CLINIC | Age: 56
End: 2021-12-06

## 2021-12-06 VITALS
HEIGHT: 62 IN | OXYGEN SATURATION: 98 % | WEIGHT: 201.8 LBS | SYSTOLIC BLOOD PRESSURE: 122 MMHG | HEART RATE: 93 BPM | TEMPERATURE: 97.3 F | DIASTOLIC BLOOD PRESSURE: 80 MMHG | BODY MASS INDEX: 37.13 KG/M2

## 2021-12-06 DIAGNOSIS — E53.8 B12 DEFICIENCY: Chronic | ICD-10-CM

## 2021-12-06 DIAGNOSIS — F33.0 MILD EPISODE OF RECURRENT DEPRESSIVE DISORDER (HCC): Primary | Chronic | ICD-10-CM

## 2021-12-06 PROCEDURE — 99214 OFFICE O/P EST MOD 30 MIN: CPT | Performed by: FAMILY MEDICINE

## 2021-12-06 PROCEDURE — 82607 VITAMIN B-12: CPT | Performed by: FAMILY MEDICINE

## 2021-12-06 PROCEDURE — 0003A COVID-19 (PFIZER): CPT | Performed by: FAMILY MEDICINE

## 2021-12-06 PROCEDURE — 91300 COVID-19 (PFIZER): CPT | Performed by: FAMILY MEDICINE

## 2021-12-06 RX ORDER — CITALOPRAM 20 MG/1
20 TABLET ORAL DAILY
Qty: 90 TABLET | Refills: 1 | Status: SHIPPED | OUTPATIENT
Start: 2021-12-06 | End: 2022-04-29 | Stop reason: SDUPTHER

## 2021-12-06 NOTE — PROGRESS NOTES
"Chief Complaint  f/u on mild episode of recurrent depressive disorder.    Subjective          Jessika Sousa presents to [facility] for     Needs B12 level rechecked    Depression  Visit Type: follow-up (on Celexa 20 and stable)  Patient is not experiencing: anhedonia, depressed mood, excessive worry, fatigue, insomnia, irritability, malaise, nervousness/anxiety, palpitations, panic, shortness of breath, suicidal ideas, suicidal planning, thoughts of death, weight gain and weight loss.  Frequency of symptoms: occasionally   Severity: moderate   Sleep per night: 5 hours  Sleep quality: fair  Nighttime awakenings: occasional        Objective   Vital Signs:   /80   Pulse 93   Temp 97.3 °F (36.3 °C)   Ht 157.5 cm (62\")   Wt 91.5 kg (201 lb 12.8 oz)   SpO2 98%   BMI 36.91 kg/m²       Review of Systems   Constitutional: Negative for fatigue, fever, irritability, unexpected weight change, weight gain and weight loss.   Respiratory: Negative for chest tightness, shortness of breath and wheezing.    Cardiovascular: Negative for chest pain, palpitations and leg swelling.   Psychiatric/Behavioral: Negative for suicidal ideas. The patient is not nervous/anxious and does not have insomnia.      Physical Exam  Vitals and nursing note reviewed.   Constitutional:       General: She is not in acute distress.     Appearance: She is well-developed. She is not diaphoretic.   HENT:      Right Ear: External ear normal.      Left Ear: External ear normal.      Nose: Nose normal.   Eyes:      Conjunctiva/sclera: Conjunctivae normal.      Pupils: Pupils are equal, round, and reactive to light.   Neck:      Thyroid: No thyromegaly.   Cardiovascular:      Rate and Rhythm: Normal rate and regular rhythm.      Heart sounds: Normal heart sounds. No murmur heard.      Pulmonary:      Effort: Pulmonary effort is normal. No respiratory distress.      Breath sounds: Normal breath sounds. No wheezing. "   Abdominal:      General: Bowel sounds are normal. There is no distension.      Palpations: Abdomen is soft. There is no mass.      Tenderness: There is no abdominal tenderness. There is no guarding or rebound.      Hernia: No hernia is present.   Musculoskeletal:         General: No tenderness. Normal range of motion.      Cervical back: Normal range of motion and neck supple.   Lymphadenopathy:      Cervical: No cervical adenopathy.   Skin:     General: Skin is warm and dry.      Coloration: Skin is not pale.      Findings: No erythema or rash.   Neurological:      Mental Status: She is alert and oriented to person, place, and time.      Deep Tendon Reflexes: Reflexes are normal and symmetric.   Psychiatric:         Behavior: Behavior normal.         Thought Content: Thought content normal.         Judgment: Judgment normal.        Result Review :                 Assessment and Plan    Problem List Items Addressed This Visit     None      Visit Diagnoses     Mild episode of recurrent depressive disorder (HCC)  (Chronic)   -  Primary    Relevant Medications    citalopram (CeleXA) 20 MG tablet    B12 deficiency  (Chronic)       Relevant Orders    Vitamin B12          Follow Up   No follow-ups on file.  Patient was given instructions and counseling regarding her condition or for health maintenance advice. Please see specific information pulled into the AVS if appropriate.

## 2021-12-07 LAB — VIT B12 BLD-MCNC: 632 PG/ML (ref 211–946)

## 2022-03-29 ENCOUNTER — OFFICE VISIT (OUTPATIENT)
Dept: FAMILY MEDICINE CLINIC | Facility: CLINIC | Age: 57
End: 2022-03-29

## 2022-03-29 VITALS
OXYGEN SATURATION: 99 % | HEART RATE: 87 BPM | WEIGHT: 204 LBS | RESPIRATION RATE: 16 BRPM | SYSTOLIC BLOOD PRESSURE: 122 MMHG | HEIGHT: 62 IN | DIASTOLIC BLOOD PRESSURE: 78 MMHG | TEMPERATURE: 98.4 F | BODY MASS INDEX: 37.54 KG/M2

## 2022-03-29 DIAGNOSIS — J32.8 OTHER CHRONIC SINUSITIS: Primary | ICD-10-CM

## 2022-03-29 DIAGNOSIS — J30.89 SEASONAL ALLERGIC RHINITIS DUE TO OTHER ALLERGIC TRIGGER: ICD-10-CM

## 2022-03-29 PROCEDURE — 99213 OFFICE O/P EST LOW 20 MIN: CPT | Performed by: PHYSICIAN ASSISTANT

## 2022-03-29 RX ORDER — CETIRIZINE HYDROCHLORIDE 10 MG/1
10 TABLET ORAL DAILY
Qty: 30 TABLET | Refills: 3 | Status: SHIPPED | OUTPATIENT
Start: 2022-03-29 | End: 2022-03-29

## 2022-03-29 RX ORDER — PREDNISONE 20 MG/1
TABLET ORAL
Qty: 9 TABLET | Refills: 0 | Status: SHIPPED | OUTPATIENT
Start: 2022-03-29 | End: 2022-04-29

## 2022-03-29 RX ORDER — AMOXICILLIN 875 MG/1
875 TABLET, COATED ORAL EVERY 12 HOURS SCHEDULED
Qty: 20 TABLET | Refills: 0 | Status: SHIPPED | OUTPATIENT
Start: 2022-03-29 | End: 2022-04-08

## 2022-03-29 RX ORDER — IPRATROPIUM BROMIDE 21 UG/1
2 SPRAY, METERED NASAL EVERY 12 HOURS
Qty: 1 EACH | Refills: 3 | Status: SHIPPED | OUTPATIENT
Start: 2022-03-29

## 2022-03-29 RX ORDER — CETIRIZINE HYDROCHLORIDE 10 MG/1
10 TABLET ORAL DAILY
Qty: 30 TABLET | Refills: 3 | Status: SHIPPED | OUTPATIENT
Start: 2022-03-29 | End: 2023-03-15 | Stop reason: ALTCHOICE

## 2022-03-29 NOTE — PROGRESS NOTES
"Chief Complaint  Sinusitis (Ongoing for the last month)    Subjective          Jessika Sousa presents to [facility]  History of Present Illness     Patient states that two months ago was tested for covid and was negative. At that time she was told that she had  fluid on her ears. She was not put on antibiotics. She was given allegra D and nasal spray and didn't help much. Ears are better but she still has pain in maxillary sinuses that are worse at night. She gets a heardache with pain behind the eyes as well. She has nasal congestion as well. She is not sneezing or having runny nose. She keeps PND.  She has always had sinus infections and mainly issues with allergies in the fall. She has never been teseted for allergies. She is not blowing aout discolored drainage but does get a funny smell in nose. She has not been treated with anitbiotic for sinusitis for at least six months. She has been taking allegra D but no nasal sprays. She has not had any fever or cough    Objective   Vital Signs:   /78   Pulse 87   Temp 98.4 °F (36.9 °C)   Resp 16   Ht 157.5 cm (62\")   Wt 92.5 kg (204 lb)   SpO2 99%   BMI 37.31 kg/m²            Physical Exam  Constitutional:       Appearance: Normal appearance.   HENT:      Head: Normocephalic.      Right Ear: Tympanic membrane normal.      Left Ear: Tympanic membrane normal.      Nose: Congestion and rhinorrhea present.      Mouth/Throat:      Mouth: Mucous membranes are moist.      Comments: Discolored post nasal drainage  Neurological:      Mental Status: She is alert.        Result Review :                 Assessment and Plan    Diagnoses and all orders for this visit:    1. Other chronic sinusitis (Primary)  -     predniSONE (DELTASONE) 20 MG tablet; 2 tablets po qd x 3 days then 1 po qd x 3 days  Dispense: 9 tablet; Refill: 0  -     amoxicillin (AMOXIL) 875 MG tablet; Take 1 tablet by mouth Every 12 (Twelve) Hours for 10 days.  " Dispense: 20 tablet; Refill: 0    2. Seasonal allergic rhinitis due to other allergic trigger  -     ipratropium (ATROVENT) 0.03 % nasal spray; 2 sprays into the nostril(s) as directed by provider Every 12 (Twelve) Hours.  Dispense: 1 each; Refill: 3  -     cetirizine (zyrTEC) 10 MG tablet; Take 1 tablet by mouth Daily.  Dispense: 30 tablet; Refill: 3    If she finds that her symptoms do not improve over the next week she is to return to clinic    Follow Up   Return in about 1 month (around 4/29/2022), or sinusitis and allergies.  Patient was given instructions and counseling regarding her condition or for health maintenance advice. Please see specific information pulled into the AVS if appropriate.

## 2022-03-30 NOTE — PROGRESS NOTES
I have reviewed the notes, assessments, and/or procedures performed by EARL Gomez.  I concur with her/his documentation of Jessika Sousa.

## 2022-04-29 ENCOUNTER — LAB (OUTPATIENT)
Dept: LAB | Facility: HOSPITAL | Age: 57
End: 2022-04-29

## 2022-04-29 ENCOUNTER — OFFICE VISIT (OUTPATIENT)
Dept: FAMILY MEDICINE CLINIC | Facility: CLINIC | Age: 57
End: 2022-04-29

## 2022-04-29 VITALS
HEART RATE: 84 BPM | HEIGHT: 62 IN | OXYGEN SATURATION: 98 % | BODY MASS INDEX: 38.16 KG/M2 | RESPIRATION RATE: 17 BRPM | TEMPERATURE: 98.5 F | DIASTOLIC BLOOD PRESSURE: 72 MMHG | SYSTOLIC BLOOD PRESSURE: 116 MMHG | WEIGHT: 207.4 LBS

## 2022-04-29 DIAGNOSIS — N28.1 KIDNEY CYST, ACQUIRED: Primary | ICD-10-CM

## 2022-04-29 DIAGNOSIS — E66.9 OBESITY, UNSPECIFIED CLASSIFICATION, UNSPECIFIED OBESITY TYPE, UNSPECIFIED WHETHER SERIOUS COMORBIDITY PRESENT: ICD-10-CM

## 2022-04-29 DIAGNOSIS — F33.0 MILD EPISODE OF RECURRENT DEPRESSIVE DISORDER: Chronic | ICD-10-CM

## 2022-04-29 DIAGNOSIS — K76.0 FATTY LIVER: ICD-10-CM

## 2022-04-29 LAB
25(OH)D3 SERPL-MCNC: 17.7 NG/ML (ref 30–100)
ALBUMIN SERPL-MCNC: 4.5 G/DL (ref 3.5–5.2)
ALBUMIN/GLOB SERPL: 1.5 G/DL
ALP SERPL-CCNC: 53 U/L (ref 39–117)
ALT SERPL W P-5'-P-CCNC: 27 U/L (ref 1–33)
ANION GAP SERPL CALCULATED.3IONS-SCNC: 16.1 MMOL/L (ref 5–15)
AST SERPL-CCNC: 23 U/L (ref 1–32)
BILIRUB SERPL-MCNC: 0.5 MG/DL (ref 0–1.2)
BUN SERPL-MCNC: 12 MG/DL (ref 6–20)
BUN/CREAT SERPL: 20 (ref 7–25)
CALCIUM SPEC-SCNC: 9.6 MG/DL (ref 8.6–10.5)
CHLORIDE SERPL-SCNC: 104 MMOL/L (ref 98–107)
CO2 SERPL-SCNC: 18.9 MMOL/L (ref 22–29)
CREAT SERPL-MCNC: 0.6 MG/DL (ref 0.57–1)
DEPRECATED RDW RBC AUTO: 42.3 FL (ref 37–54)
EGFRCR SERPLBLD CKD-EPI 2021: 105.5 ML/MIN/1.73
ERYTHROCYTE [DISTWIDTH] IN BLOOD BY AUTOMATED COUNT: 13.6 % (ref 12.3–15.4)
GLOBULIN UR ELPH-MCNC: 3.1 GM/DL
GLUCOSE SERPL-MCNC: 72 MG/DL (ref 65–99)
HBA1C MFR BLD: 5.5 % (ref 4.8–5.6)
HCT VFR BLD AUTO: 43.8 % (ref 34–46.6)
HCV AB SER DONR QL: NORMAL
HGB BLD-MCNC: 15.1 G/DL (ref 12–15.9)
MCH RBC QN AUTO: 29.4 PG (ref 26.6–33)
MCHC RBC AUTO-ENTMCNC: 34.5 G/DL (ref 31.5–35.7)
MCV RBC AUTO: 85.2 FL (ref 79–97)
PLATELET # BLD AUTO: 325 10*3/MM3 (ref 140–450)
PMV BLD AUTO: 9.9 FL (ref 6–12)
POTASSIUM SERPL-SCNC: 3.9 MMOL/L (ref 3.5–5.2)
PROT SERPL-MCNC: 7.6 G/DL (ref 6–8.5)
RBC # BLD AUTO: 5.14 10*6/MM3 (ref 3.77–5.28)
SODIUM SERPL-SCNC: 139 MMOL/L (ref 136–145)
TSH SERPL DL<=0.05 MIU/L-ACNC: 1.7 UIU/ML (ref 0.27–4.2)
VIT B12 BLD-MCNC: 425 PG/ML (ref 211–946)
WBC NRBC COR # BLD: 5.74 10*3/MM3 (ref 3.4–10.8)

## 2022-04-29 PROCEDURE — 86707 HEPATITIS BE ANTIBODY: CPT | Performed by: STUDENT IN AN ORGANIZED HEALTH CARE EDUCATION/TRAINING PROGRAM

## 2022-04-29 PROCEDURE — 86706 HEP B SURFACE ANTIBODY: CPT | Performed by: STUDENT IN AN ORGANIZED HEALTH CARE EDUCATION/TRAINING PROGRAM

## 2022-04-29 PROCEDURE — 80050 GENERAL HEALTH PANEL: CPT | Performed by: STUDENT IN AN ORGANIZED HEALTH CARE EDUCATION/TRAINING PROGRAM

## 2022-04-29 PROCEDURE — 80074 ACUTE HEPATITIS PANEL: CPT | Performed by: STUDENT IN AN ORGANIZED HEALTH CARE EDUCATION/TRAINING PROGRAM

## 2022-04-29 PROCEDURE — 99214 OFFICE O/P EST MOD 30 MIN: CPT | Performed by: STUDENT IN AN ORGANIZED HEALTH CARE EDUCATION/TRAINING PROGRAM

## 2022-04-29 PROCEDURE — 82306 VITAMIN D 25 HYDROXY: CPT | Performed by: STUDENT IN AN ORGANIZED HEALTH CARE EDUCATION/TRAINING PROGRAM

## 2022-04-29 PROCEDURE — 83036 HEMOGLOBIN GLYCOSYLATED A1C: CPT | Performed by: STUDENT IN AN ORGANIZED HEALTH CARE EDUCATION/TRAINING PROGRAM

## 2022-04-29 PROCEDURE — 82607 VITAMIN B-12: CPT | Performed by: STUDENT IN AN ORGANIZED HEALTH CARE EDUCATION/TRAINING PROGRAM

## 2022-04-29 PROCEDURE — 87350 HEPATITIS BE AG IA: CPT | Performed by: STUDENT IN AN ORGANIZED HEALTH CARE EDUCATION/TRAINING PROGRAM

## 2022-04-29 PROCEDURE — 86704 HEP B CORE ANTIBODY TOTAL: CPT | Performed by: STUDENT IN AN ORGANIZED HEALTH CARE EDUCATION/TRAINING PROGRAM

## 2022-04-29 RX ORDER — CITALOPRAM 20 MG/1
20 TABLET ORAL DAILY
Qty: 90 TABLET | Refills: 1 | Status: SHIPPED | OUTPATIENT
Start: 2022-04-29 | End: 2023-02-06

## 2022-04-29 NOTE — PROGRESS NOTES
New Patient Office Visit      Patient Name: Jessika Sousa  : 1965   MRN: [id number]     Chief Complaint:  chronic sinusitis  (F/u)     History of Present Illness:       She says she was given amoxicillin, prednisone, zyrtec and atrovent. She says this worked perfectly for her, but she still cannot breathe out of both nostrils. She says the pain is gone. She says the clogged nostril switches back and forth.     Depression  Needs refill on celexa. No side effects. She says this helps and cleared up brain fog which was her main symptom. She says no depression or anxiety today.     Continue zyrtec for allergies.     b12 deficiency  She did shots in the past but now is doing oral b12 supplements otc.           Subjective        Past Medical History:   Diagnosis Date   • Irritable bowel syndrome        Past Surgical History:   Procedure Laterality Date   • FOOT SURGERY      Bilateral Foot Spur Remove        Family History   Problem Relation Age of Onset   • Hypertension Mother    • Cancer Mother    • Ulcers Father    • Stroke Sister    • No Known Problems Maternal Grandmother    • No Known Problems Paternal Grandmother    • Pancreatitis Sister        Social History     Socioeconomic History   • Marital status:    Tobacco Use   • Smoking status: Never Smoker   • Smokeless tobacco: Never Used   Vaping Use   • Vaping Use: Never used   Substance and Sexual Activity   • Alcohol use: Yes     Comment: OCC   • Drug use: No   • Sexual activity: Defer          Current Outpatient Medications:   •  cetirizine (zyrTEC) 10 MG tablet, Take 1 tablet by mouth Daily., Disp: 30 tablet, Rfl: 3  •  citalopram (CeleXA) 20 MG tablet, Take 1 tablet by mouth Daily., Disp: 90 tablet, Rfl: 1  •  ipratropium (ATROVENT) 0.03 % nasal spray, 2 sprays into the nostril(s) as directed by provider Every 12 (Twelve) Hours., Disp: 1 each, Rfl: 3    Current Facility-Administered Medications:   •  cyanocobalamin injection 1,000 mcg,  "1,000 mcg, Intramuscular, Q28 Days, Tiki, Kajal A, DO, 1,000 mcg at 09/10/20 1041  •  cyanocobalamin injection 1,000 mcg, 1,000 mcg, Intramuscular, Q28 Days, [place], Kajal A, DO, 1,000 mcg at 01/10/20 1242  •  cyanocobalamin injection 1,000 mcg, 1,000 mcg, Intramuscular, Q28 Days, [place], Kajal A, DO, 1,000 mcg at 04/21/21 1152  •  cyanocobalamin injection 1,000 mcg, 1,000 mcg, Intramuscular, Q28 Days, [place], Kajal A, DO, 1,000 mcg at 11/01/21 1120    No Known Allergies    Objective     Physical Exam:  Vitals:    04/29/22 1127   BP: 116/72   Pulse: 84   Resp: 17   Temp: 98.5 °F (36.9 °C)   SpO2: 98%   Weight: 94.1 kg (207 lb 6.4 oz)   Height: 157.5 cm (62\")   PainSc: 0-No pain      Body mass index is 37.93 kg/m².     Physical Exam  Constitutional:       General: She is not in acute distress.     Appearance: Normal appearance.   HENT:      Head: Normocephalic and atraumatic.   Eyes:      Extraocular Movements: Extraocular movements intact.   Cardiovascular:      Rate and Rhythm: Normal rate and regular rhythm.      Heart sounds: No murmur heard.  Pulmonary:      Effort: Pulmonary effort is normal. No respiratory distress.      Breath sounds: Normal breath sounds.   Abdominal:      General: Abdomen is flat.   Musculoskeletal:         General: No swelling.      Cervical back: Normal range of motion.   Skin:     Findings: No rash.   Neurological:      General: No focal deficit present.      Mental Status: She is alert.   Psychiatric:         Mood and Affect: Mood normal.              Assessment / Plan      Assessment/Plan:   Diagnoses and all orders for this visit:    1. Kidney cyst, acquired (Primary)  -     US Renal Bilateral; Future    2. Mild episode of recurrent depressive disorder (HCC)  -     citalopram (CeleXA) 20 MG tablet; Take 1 tablet by mouth Daily.  Dispense: 90 tablet; Refill: 1  -     CBC (No Diff); Future  -     Comprehensive Metabolic Panel; Future  -     Hemoglobin A1c; Future  -     Hepatitis C " Antibody; Future  -     TSH Rfx On Abnormal To Free T4; Future  -     Vitamin B12; Future  -     Vitamin D 25 Hydroxy; Future  -     Hepatitis B Virus Profile; Future  -     CBC (No Diff)  -     Comprehensive Metabolic Panel  -     Hemoglobin A1c  -     Hepatitis C Antibody  -     TSH Rfx On Abnormal To Free T4  -     Vitamin B12  -     Vitamin D 25 Hydroxy  -     Hepatitis B Virus Profile    3. Fatty liver  -     US Abdomen Limited; Future     Advised to avoid alcohol, update hep b and a vaccine, focus on weight loss.     For congestion add flonase for 3 months advised can cause withdrawal bleeding in nose.     Return in about 4 months (around 8/29/2022) for Annual.       Shirin Dave D.O.  [facility]

## 2022-04-30 LAB
HBV CORE AB SERPL QL IA: NEGATIVE
HBV CORE IGM SERPL QL IA: NEGATIVE
HBV E AB SERPL QL IA: NEGATIVE
HBV E AG SERPL QL IA: NEGATIVE
HBV SURFACE AB SER QL: NON REACTIVE
HBV SURFACE AG SERPL QL IA: NEGATIVE

## 2022-05-04 ENCOUNTER — APPOINTMENT (OUTPATIENT)
Dept: ULTRASOUND IMAGING | Facility: HOSPITAL | Age: 57
End: 2022-05-04

## 2022-05-23 ENCOUNTER — TELEPHONE (OUTPATIENT)
Dept: FAMILY MEDICINE CLINIC | Facility: CLINIC | Age: 57
End: 2022-05-23

## 2022-05-23 NOTE — TELEPHONE ENCOUNTER
PATIENT SCHEDULED FOR 2 ULTRASOUNDS, RENAL AND LIMITED ULTRASOUND.  WHY 2 INSTEAD OF 1 COMPLETE ABD ULTRASOUND.  ABOUT $1000 DIFFERENCE IN THE COST.  APPOINTMENT THIS Thursday.      PLEASE CALL [phone]

## 2022-05-26 ENCOUNTER — HOSPITAL ENCOUNTER (OUTPATIENT)
Dept: ULTRASOUND IMAGING | Facility: HOSPITAL | Age: 57
Discharge: HOME | End: 2022-05-26

## 2022-05-26 DIAGNOSIS — K76.0 FATTY LIVER: ICD-10-CM

## 2022-05-26 DIAGNOSIS — N28.1 KIDNEY CYST, ACQUIRED: ICD-10-CM

## 2022-05-26 DIAGNOSIS — Q61.02 MULTIPLE RENAL CYSTS: ICD-10-CM

## 2022-05-26 PROCEDURE — 76700 US EXAM ABDOM COMPLETE: CPT

## 2022-06-16 ENCOUNTER — HOSPITAL ENCOUNTER (OUTPATIENT)
Dept: NUTRITION | Facility: HOSPITAL | Age: 57
Setting detail: RECURRING SERIES
Discharge: HOME | End: 2022-06-16

## 2022-06-16 PROCEDURE — 97802 MEDICAL NUTRITION INDIV IN: CPT

## 2022-06-16 NOTE — CONSULTS
"Adult Outpatient Nutrition  Assessment/PES    Patient Name:  Jessika Sousa  YOB: 1965  MRN: [id number]    Assessment Date:  6/16/2022    RD spent a total of 60 minutes with patient today.     Patient asked lots of good questions during our session and could teach back examples of balanced meals. She typically eats 2 meals and 2 snacks a day. She works out in the morning and eats after she works out. She also has IBS and thinks she may have issues with acid reflux but it's not diagnosed.     She was also complaining of bloating. RD emailed directions for low stomach acid test.      Reason for visit:     Patient is a 57 y/o F who is referred today for weight loss.     Session Details:     Attendees: Patient     Anthropometrics:     Ht Readings from Last 1 Encounters:   04/29/22 157.5 cm (62\")      Wt Readings from Last 1 Encounters:   04/29/22 94.1 kg (207 lb 6.4 oz)      BMI Readings from Last 1 Encounters:   04/29/22 37.93 kg/m²       Pertinent Medications/Supplements:    Vitamin b12  Vitamin D    Nutrition Assessment:     Food assistance programs: None  Who prepares most meals: She does  Who does grocery shopping: She does  Frequency of eating out: a few times a month    Diet recall: please refer to questionnaire under Media tab for more information     Physical activity:    Activity or exercise program: water tea, piliates and yoga  Frequency of exercise: 5 days a week  Duration of exercise: 1 hr    Assessed Needs:     Estimated energy needs: 1,700 ([place] [address] Jeor, activity factor 1.4, subtracting 400 calories to promote safe weight loss)       PES Statement:     Inadequate oral intake related to not eating 3 meals and not eating balanced meals as evidenced by dietary recall, interview and lots of exercise.      Discussion:     Consistency of meals: We discussed the importance of eating consistent, balanced meals to meet nutrient needs to promote satiety and satisfaction when eating. MICHELLE " recommended patient to try and incorporate a small meal or snack in the morning and to avoid overeating and snacking throughout the day. RD explained meal patterns should be individualized from person to person. We discussed how sometimes cravings are trigger due to skipping meals and not incorporating all 3 categories of nutrients.      The components of a healthy meal and snack: We discussed how the three main nutrients our bodies need are protein, carbohydrates, and fat. RD recommended the patient aim to have a source of lean protein, fiber rich carbohydrates, and heart healthy fats with each of her meals and snacks. RD provided examples, such as having some yogurt with her sandwich for additional carbohydrates and protein rather than just having a sandwich by itself. We discussed the benefits this provides, such as meeting nutrient needs, and promoting satiety and satisfaction when eating.     Goals:    1. Drinking at least 64 ounces of water per day.     Resources Provided:     RD provided resources after session:  Eating to fel your best  The 3 macronutrients  Macronutrient foundations  Quick meal ideas  Quick breakfast ideas        Total of 60 minutes spent with patient on nutrition counseling. Education based on Academy of Nutrition and Dietetics guidelines. Patient was provided with RD's contact information. Follow up visit is scheduled for 8/1 at 11:45 AM.  Thank you for this referral.        Electronically signed by:  Floridalma Snyder RD  06/16/22 14:10 EDT

## 2022-08-01 ENCOUNTER — APPOINTMENT (OUTPATIENT)
Dept: NUTRITION | Facility: HOSPITAL | Age: 57
End: 2022-08-01

## 2022-08-30 ENCOUNTER — OFFICE VISIT (OUTPATIENT)
Dept: FAMILY MEDICINE CLINIC | Facility: CLINIC | Age: 57
End: 2022-08-30

## 2022-08-30 VITALS
WEIGHT: 199 LBS | RESPIRATION RATE: 18 BRPM | SYSTOLIC BLOOD PRESSURE: 120 MMHG | HEART RATE: 88 BPM | BODY MASS INDEX: 36.62 KG/M2 | DIASTOLIC BLOOD PRESSURE: 90 MMHG | OXYGEN SATURATION: 99 % | HEIGHT: 62 IN | TEMPERATURE: 98.6 F

## 2022-08-30 DIAGNOSIS — R19.7 DIARRHEA, UNSPECIFIED TYPE: ICD-10-CM

## 2022-08-30 DIAGNOSIS — Z12.31 ENCOUNTER FOR SCREENING MAMMOGRAM FOR MALIGNANT NEOPLASM OF BREAST: ICD-10-CM

## 2022-08-30 DIAGNOSIS — Z00.00 ANNUAL PHYSICAL EXAM: Primary | ICD-10-CM

## 2022-08-30 DIAGNOSIS — K76.0 FATTY LIVER: ICD-10-CM

## 2022-08-30 DIAGNOSIS — E53.8 B12 DEFICIENCY: ICD-10-CM

## 2022-08-30 PROCEDURE — 99214 OFFICE O/P EST MOD 30 MIN: CPT | Performed by: STUDENT IN AN ORGANIZED HEALTH CARE EDUCATION/TRAINING PROGRAM

## 2022-08-30 PROCEDURE — 90746 HEPB VACCINE 3 DOSE ADULT IM: CPT | Performed by: STUDENT IN AN ORGANIZED HEALTH CARE EDUCATION/TRAINING PROGRAM

## 2022-08-30 PROCEDURE — 99396 PREV VISIT EST AGE 40-64: CPT | Performed by: STUDENT IN AN ORGANIZED HEALTH CARE EDUCATION/TRAINING PROGRAM

## 2022-08-30 PROCEDURE — 90471 IMMUNIZATION ADMIN: CPT | Performed by: STUDENT IN AN ORGANIZED HEALTH CARE EDUCATION/TRAINING PROGRAM

## 2022-08-30 RX ORDER — CHOLESTYRAMINE 4 G/9G
POWDER, FOR SUSPENSION ORAL
Qty: 90 EACH | Refills: 0 | Status: SHIPPED | OUTPATIENT
Start: 2022-08-30

## 2022-08-30 RX ORDER — CHOLESTYRAMINE 4 G/9G
1 POWDER, FOR SUSPENSION ORAL DAILY PRN
Qty: 30 PACKET | Refills: 0 | Status: SHIPPED | OUTPATIENT
Start: 2022-08-30 | End: 2022-08-30

## 2022-08-30 RX ORDER — LANOLIN ALCOHOL/MO/W.PET/CERES
1000 CREAM (GRAM) TOPICAL DAILY
Qty: 90 TABLET | Refills: 3 | Status: SHIPPED | OUTPATIENT
Start: 2022-08-30

## 2022-08-30 NOTE — ASSESSMENT & PLAN NOTE
Colonoscopy: up to date at this time   Mammogram: ordered   Pap smear: done with [facility]  Counseled on diet and exercise including limited sweets and sugars to focus on lean meat and vegetables. Counseled on 50 minutes of moderate exercise 3 times per week.   Recommend dental and eye exam  covid vaccinated  Shingles due and recommended   Tdap: she is agreeable   Hepatitis b given

## 2022-08-30 NOTE — PROGRESS NOTES
"Chief Complaint  Annual Exam    History of Present Illness    Annual exam  Colonoscopy: up to date at this time   Mammogram: ordered   Pap smear: done with [facility]  Counseled on diet and exercise including limited sweets and sugars to focus on lean meat and vegetables. Counseled on 50 minutes of moderate exercise 3 times per week.   Recommend dental and eye exam  covid vaccinated  Shingles due and recommended   Tdap: she is agreeable   Hepatitis b given      Fatty liver  She says her sister has fatty liver that had turned to cirrhosis.   She doesn't drink alcohol.     IBS  She feels this has been acting up recently and is not affected by what she eats. She does go from constipation to diarrhea. Her most worrisome symptom is diarrhea. No abdominal cramping.     The following portions of the patient's history were reviewed and updated as appropriate: allergies, current medications, past family history, past medical history, past social history, past surgical history, and problem list.    OBJECTIVE:  /90   Pulse 88   Temp 98.6 °F (37 °C)   Resp 18   Ht 157.5 cm (62\")   Wt 90.3 kg (199 lb)   SpO2 99%   BMI 36.40 kg/m²       Physical Exam  Constitutional:       General: She is not in acute distress.     Appearance: Normal appearance.   HENT:      Head: Normocephalic and atraumatic.   Eyes:      Extraocular Movements: Extraocular movements intact.   Cardiovascular:      Rate and Rhythm: Normal rate and regular rhythm.      Heart sounds: No murmur heard.  Pulmonary:      Effort: Pulmonary effort is normal. No respiratory distress.      Breath sounds: Normal breath sounds.   Abdominal:      General: Bowel sounds are normal. There is no distension.      Palpations: Abdomen is soft.      Tenderness: There is no abdominal tenderness. There is no guarding or rebound.      Hernia: No hernia is present.   Musculoskeletal:         General: No swelling.      Cervical back: Normal range of motion.   Skin:    "  Findings: No rash.   Neurological:      General: No focal deficit present.      Mental Status: She is alert.   Psychiatric:         Mood and Affect: Mood normal.                    Assessment and Plan   Diagnoses and all orders for this visit:    1. Annual physical exam (Primary)  Assessment & Plan:  Colonoscopy: up to date at this time   Mammogram: ordered   Pap smear: done with [facility]  Counseled on diet and exercise including limited sweets and sugars to focus on lean meat and vegetables. Counseled on 50 minutes of moderate exercise 3 times per week.   Recommend dental and eye exam  covid vaccinated  Shingles due and recommended   Tdap: she is agreeable   Hepatitis b given      2. Diarrhea, unspecified type  -     Celiac Disease Panel; Future    3. Fatty liver  -     SOLITARIO Fibrosure; Future  -     Lipid Panel; Future  -     Ceruloplasmin; Future  -     Ferritin; Future  -     Iron Profile; Future  -     Alpha - 1 - Antitrypsin; Future  -     Mitochondrial Antibodies, M2; Future  -     Anti-Smooth Muscle Antibody Titer; Future  -     Hepatitis A Antibody, IgM; Future  -     Hepatitis A Antibody, Total; Future    4. B12 deficiency  -     Intrinsic Factor Ab; Future    5. Encounter for screening mammogram for malignant neoplasm of breast  -     Mammo screening digital tomosynthesis bilateral w CAD; Future    Other orders  -     Hepatitis B Vaccine Adult IM (ENERGIX/RECOMBIVAX)  -     vitamin B-12 (CYANOCOBALAMIN) 1000 MCG tablet; Take 1 tablet by mouth Daily.  Dispense: 90 tablet; Refill: 3  -     cholestyramine (QUESTRAN) 4 g packet; Take 1 packet by mouth Daily As Needed (diarrhea).  Dispense: 30 packet; Refill: 0    Advised weight loss with avoidance of alcohol and hepatitis a and b vaccines.     Give cholestryramine for ibs diarrhea. Call for any constipation it may cause.      Return in about 1 month (around 9/30/2022).       Shirin Dave D.O.  [facility]

## 2022-08-31 ENCOUNTER — LAB (OUTPATIENT)
Dept: LAB | Facility: HOSPITAL | Age: 57
End: 2022-08-31

## 2022-08-31 DIAGNOSIS — R19.7 DIARRHEA, UNSPECIFIED TYPE: ICD-10-CM

## 2022-08-31 DIAGNOSIS — E53.8 B12 DEFICIENCY: ICD-10-CM

## 2022-08-31 DIAGNOSIS — K76.0 FATTY LIVER: ICD-10-CM

## 2022-08-31 LAB
ALPHA1 GLOB MFR UR ELPH: 132 MG/DL (ref 90–200)
CERULOPLASMIN SERPL-MCNC: 26 MG/DL (ref 19–39)
CHOLEST SERPL-MCNC: 287 MG/DL (ref 0–200)
FERRITIN SERPL-MCNC: 81 NG/ML (ref 13–150)
HAV IGM SERPL QL IA: NORMAL
HDLC SERPL-MCNC: 48 MG/DL (ref 40–60)
IRON 24H UR-MRATE: 121 MCG/DL (ref 37–145)
IRON SATN MFR SERPL: 24 % (ref 20–50)
LDLC SERPL CALC-MCNC: 203 MG/DL (ref 0–100)
LDLC/HDLC SERPL: 4.2 {RATIO}
TIBC SERPL-MCNC: 499 MCG/DL (ref 298–536)
TRANSFERRIN SERPL-MCNC: 335 MG/DL (ref 200–360)
TRIGL SERPL-MCNC: 188 MG/DL (ref 0–150)
VLDLC SERPL-MCNC: 36 MG/DL (ref 5–40)

## 2022-08-31 PROCEDURE — 82103 ALPHA-1-ANTITRYPSIN TOTAL: CPT

## 2022-08-31 PROCEDURE — 82947 ASSAY GLUCOSE BLOOD QUANT: CPT

## 2022-08-31 PROCEDURE — 82465 ASSAY BLD/SERUM CHOLESTEROL: CPT

## 2022-08-31 PROCEDURE — 84478 ASSAY OF TRIGLYCERIDES: CPT

## 2022-08-31 PROCEDURE — 84450 TRANSFERASE (AST) (SGOT): CPT

## 2022-08-31 PROCEDURE — 80061 LIPID PANEL: CPT

## 2022-08-31 PROCEDURE — 86364 TISS TRNSGLTMNASE EA IG CLAS: CPT

## 2022-08-31 PROCEDURE — 82977 ASSAY OF GGT: CPT

## 2022-08-31 PROCEDURE — 86708 HEPATITIS A ANTIBODY: CPT

## 2022-08-31 PROCEDURE — 84466 ASSAY OF TRANSFERRIN: CPT

## 2022-08-31 PROCEDURE — 86709 HEPATITIS A IGM ANTIBODY: CPT

## 2022-08-31 PROCEDURE — 86231 EMA EACH IG CLASS: CPT

## 2022-08-31 PROCEDURE — 82172 ASSAY OF APOLIPOPROTEIN: CPT

## 2022-08-31 PROCEDURE — 83540 ASSAY OF IRON: CPT

## 2022-08-31 PROCEDURE — 82728 ASSAY OF FERRITIN: CPT

## 2022-08-31 PROCEDURE — 86381 MITOCHONDRIAL ANTIBODY EACH: CPT

## 2022-08-31 PROCEDURE — 86340 INTRINSIC FACTOR ANTIBODY: CPT

## 2022-08-31 PROCEDURE — 82784 ASSAY IGA/IGD/IGG/IGM EACH: CPT

## 2022-08-31 PROCEDURE — 83883 ASSAY NEPHELOMETRY NOT SPEC: CPT

## 2022-08-31 PROCEDURE — 83010 ASSAY OF HAPTOGLOBIN QUANT: CPT

## 2022-08-31 PROCEDURE — 86015 ACTIN ANTIBODY EACH: CPT

## 2022-08-31 PROCEDURE — 82247 BILIRUBIN TOTAL: CPT

## 2022-08-31 PROCEDURE — 36415 COLL VENOUS BLD VENIPUNCTURE: CPT

## 2022-08-31 PROCEDURE — 84460 ALANINE AMINO (ALT) (SGPT): CPT

## 2022-08-31 PROCEDURE — 82390 ASSAY OF CERULOPLASMIN: CPT

## 2022-09-01 LAB
ENDOMYSIUM IGA SER QL: NEGATIVE
HAV AB SER QL IA: POSITIVE
IGA SERPL-MCNC: 220 MG/DL (ref 87–352)
MITOCHONDRIA M2 IGG SER-ACNC: <20 UNITS (ref 0–20)
SMA IGG SER-ACNC: 4 UNITS (ref 0–19)
TTG IGA SER-ACNC: <2 U/ML (ref 0–3)

## 2022-09-01 RX ORDER — ATORVASTATIN CALCIUM 20 MG/1
20 TABLET, FILM COATED ORAL DAILY
Qty: 90 TABLET | Refills: 0 | Status: SHIPPED | OUTPATIENT
Start: 2022-09-01 | End: 2022-12-07

## 2022-09-06 ENCOUNTER — TELEPHONE (OUTPATIENT)
Dept: FAMILY MEDICINE CLINIC | Facility: CLINIC | Age: 57
End: 2022-09-06

## 2022-09-06 LAB
A2 MACROGLOB SERPL-MCNC: 189 MG/DL (ref 110–276)
ALT SERPL W P-5'-P-CCNC: 29 IU/L (ref 0–40)
APO A-I SERPL-MCNC: 184 MG/DL (ref 116–209)
AST SERPL W P-5'-P-CCNC: 21 IU/L (ref 0–40)
BILIRUB SERPL-MCNC: 0.4 MG/DL (ref 0–1.2)
CHOLEST SERPL-MCNC: 304 MG/DL (ref 100–199)
FIBROSIS SCORING:: ABNORMAL
FIBROSIS STAGE SERPL QL: ABNORMAL
GGT SERPL-CCNC: 22 IU/L (ref 0–60)
GLUCOSE SERPL-MCNC: 91 MG/DL (ref 65–99)
HAPTOGLOB SERPL-MCNC: 154 MG/DL (ref 33–346)
IF BLOCK AB SER-ACNC: 0.9 AU/ML (ref 0–1.1)
INTERPRETATIONS: (REFERENCE): ABNORMAL
LABORATORY COMMENT REPORT: ABNORMAL
LIVER FIBR SCORE SERPL CALC.FIBROSURE: 0.09 (ref 0–0.21)
NASH SCORING (REFERENCE): ABNORMAL
NECROINFLAMMATORY ACT GRADE SERPL QL: ABNORMAL
NECROINFLAMMATORY ACT SCORE SERPL: 0.5
SERVICE CMNT-IMP: ABNORMAL
STEATOSIS GRADE (REFERENCE): ABNORMAL
STEATOSIS GRADING (REFERENCE): ABNORMAL
STEATOSIS SCORE (REFERENCE): 0.56 (ref 0–0.3)
TRIGL SERPL-MCNC: 232 MG/DL (ref 0–149)

## 2022-09-06 NOTE — TELEPHONE ENCOUNTER
Gave patient test results as relayed by Dr. Dave concerning her Fatty liver. Patient stated her understanding and had no further questions.

## 2022-09-06 NOTE — TELEPHONE ENCOUNTER
Hub staff attempted to follow warm transfer process and was unsuccessful     Caller: Jessika Sousa    Relationship to patient: Self    Best call back number: [phone]    Patient is needing: SHE IS RETURNING A CALL ABOUT HER TEST RESULTS

## 2022-09-21 ENCOUNTER — HOSPITAL ENCOUNTER (OUTPATIENT)
Dept: NUTRITION | Facility: HOSPITAL | Age: 57
Setting detail: RECURRING SERIES
Discharge: HOME | End: 2022-09-21

## 2022-09-21 NOTE — PROGRESS NOTES
"Adult Outpatient Nutrition    Patient Name:  Jessika Sousa  YOB: 1965  MRN: [id number]    Assessment Date:  9/21/2022    RD spent a total of 30 minutes with patient today.      Pt has been following a low FODMAP diet (loosely) for about a month now and feels better. The diarrhea has gotten better but she expressed confusion on what's considered low and high FODMAP foods. RD discussed and provided the elimination phase of the low FODMAP diet and meals and snacks she could have within the next 3-4 weeks. Pt has also lost 8 lbs since our initial appt.      Reason for visit:      Patient is a 55 y/o F who is referred today for weight loss and IBS.      Session Details:      Attendees: Patient      Anthropometrics:          Ht Readings from Last 1 Encounters:   04/29/22 157.5 cm (62\")          Wt Readings from Last 1 Encounters:   04/29/22 94.1 kg (207 lb 6.4 oz)          BMI Readings from Last 1 Encounters:   04/29/22 37.93 kg/m²         Pertinent Medications/Supplements:     Vitamin b12  Vitamin D     Discussion:    Discussed basic principles of low-FODMAP diet and the potential relationship between food and symptoms. Discussed process of elimination phase (to remove all potentially problematic foods) and challenge phase (to identify individual's trigger foods) in order to improve quality of life, nutritional value of meals, and decrease negative symptoms. Reviewed list of low-FODMAP foods that are approved for elimination phase. Demonstrated how to use resources to plan balanced meals. Patient was encouraged to follow elimination phase for 4-6 weeks with resolution of symptoms before beginning challenge phase. Patient will be scheduled for a follow-up appointment before beginning challenge phase.     Goals:     1. Follow a low FODMAP diet for the next 4 weeks.       Resources Provided:      RD provided resources after session:  Low FODMAP Toolkit           Total of 30 minutes spent with patient on " nutrition counseling. Education based on Academy of Nutrition and Dietetics guidelines. Patient was provided with RD's contact information. Follow up visit is scheduled for 11/2 at 11:45 AM.  Thank you for this referral.       Electronically signed by:  Floridalma Snyder RD  09/21/22 13:09 EDT

## 2022-09-28 ENCOUNTER — HOSPITAL ENCOUNTER (OUTPATIENT)
Dept: MAMMOGRAPHY | Facility: HOSPITAL | Age: 57
Discharge: HOME | End: 2022-09-28
Admitting: STUDENT IN AN ORGANIZED HEALTH CARE EDUCATION/TRAINING PROGRAM

## 2022-09-28 ENCOUNTER — APPOINTMENT (OUTPATIENT)
Dept: OTHER | Facility: HOSPITAL | Age: 57
End: 2022-09-28

## 2022-09-28 DIAGNOSIS — Z12.31 ENCOUNTER FOR SCREENING MAMMOGRAM FOR MALIGNANT NEOPLASM OF BREAST: ICD-10-CM

## 2022-09-28 PROCEDURE — 77063 BREAST TOMOSYNTHESIS BI: CPT

## 2022-09-28 PROCEDURE — 77067 SCR MAMMO BI INCL CAD: CPT | Performed by: RADIOLOGY

## 2022-09-28 PROCEDURE — 77063 BREAST TOMOSYNTHESIS BI: CPT | Performed by: RADIOLOGY

## 2022-09-28 PROCEDURE — 77067 SCR MAMMO BI INCL CAD: CPT

## 2022-09-30 ENCOUNTER — OFFICE VISIT (OUTPATIENT)
Dept: FAMILY MEDICINE CLINIC | Facility: CLINIC | Age: 57
End: 2022-09-30

## 2022-09-30 VITALS
HEART RATE: 69 BPM | BODY MASS INDEX: 36.58 KG/M2 | OXYGEN SATURATION: 98 % | RESPIRATION RATE: 22 BRPM | HEIGHT: 62 IN | SYSTOLIC BLOOD PRESSURE: 120 MMHG | DIASTOLIC BLOOD PRESSURE: 88 MMHG | WEIGHT: 198.8 LBS | TEMPERATURE: 98.4 F

## 2022-09-30 DIAGNOSIS — E78.5 HYPERLIPIDEMIA, UNSPECIFIED HYPERLIPIDEMIA TYPE: Primary | ICD-10-CM

## 2022-09-30 DIAGNOSIS — E78.49 OTHER HYPERLIPIDEMIA: ICD-10-CM

## 2022-09-30 PROCEDURE — 90746 HEPB VACCINE 3 DOSE ADULT IM: CPT | Performed by: STUDENT IN AN ORGANIZED HEALTH CARE EDUCATION/TRAINING PROGRAM

## 2022-09-30 PROCEDURE — 99214 OFFICE O/P EST MOD 30 MIN: CPT | Performed by: STUDENT IN AN ORGANIZED HEALTH CARE EDUCATION/TRAINING PROGRAM

## 2022-09-30 PROCEDURE — 90471 IMMUNIZATION ADMIN: CPT | Performed by: STUDENT IN AN ORGANIZED HEALTH CARE EDUCATION/TRAINING PROGRAM

## 2022-09-30 PROCEDURE — 90472 IMMUNIZATION ADMIN EACH ADD: CPT | Performed by: STUDENT IN AN ORGANIZED HEALTH CARE EDUCATION/TRAINING PROGRAM

## 2022-09-30 PROCEDURE — 90686 IIV4 VACC NO PRSV 0.5 ML IM: CPT | Performed by: STUDENT IN AN ORGANIZED HEALTH CARE EDUCATION/TRAINING PROGRAM

## 2022-10-11 ENCOUNTER — OFFICE VISIT (OUTPATIENT)
Dept: FAMILY MEDICINE CLINIC | Facility: CLINIC | Age: 57
End: 2022-10-11

## 2022-10-11 ENCOUNTER — LAB (OUTPATIENT)
Dept: LAB | Facility: HOSPITAL | Age: 57
End: 2022-10-11

## 2022-10-11 VITALS
SYSTOLIC BLOOD PRESSURE: 122 MMHG | DIASTOLIC BLOOD PRESSURE: 82 MMHG | RESPIRATION RATE: 24 BRPM | HEIGHT: 62 IN | WEIGHT: 200.6 LBS | TEMPERATURE: 98.4 F | OXYGEN SATURATION: 98 % | HEART RATE: 68 BPM | BODY MASS INDEX: 36.91 KG/M2

## 2022-10-11 DIAGNOSIS — N39.0 URINARY TRACT INFECTION WITHOUT HEMATURIA, SITE UNSPECIFIED: Primary | ICD-10-CM

## 2022-10-11 DIAGNOSIS — E78.5 HYPERLIPIDEMIA, UNSPECIFIED HYPERLIPIDEMIA TYPE: ICD-10-CM

## 2022-10-11 LAB
ALBUMIN SERPL-MCNC: 4.4 G/DL (ref 3.5–5.2)
ALP SERPL-CCNC: 55 U/L (ref 39–117)
ALT SERPL W P-5'-P-CCNC: 24 U/L (ref 1–33)
AST SERPL-CCNC: 21 U/L (ref 1–32)
BILIRUB BLD-MCNC: NEGATIVE MG/DL
BILIRUB CONJ SERPL-MCNC: <0.2 MG/DL (ref 0–0.3)
BILIRUB INDIRECT SERPL-MCNC: NORMAL MG/DL
BILIRUB SERPL-MCNC: 0.5 MG/DL (ref 0–1.2)
CHOLEST SERPL-MCNC: 167 MG/DL (ref 0–200)
CLARITY, POC: CLEAR
COLOR UR: YELLOW
EXPIRATION DATE: ABNORMAL
GLUCOSE UR STRIP-MCNC: NEGATIVE MG/DL
HDLC SERPL-MCNC: 50 MG/DL (ref 40–60)
KETONES UR QL: NEGATIVE
LDLC SERPL CALC-MCNC: 97 MG/DL (ref 0–100)
LDLC/HDLC SERPL: 1.89 {RATIO}
LEUKOCYTE EST, POC: ABNORMAL
Lab: ABNORMAL
NITRITE UR-MCNC: NEGATIVE MG/ML
PH UR: 6 [PH] (ref 5–8)
PROT SERPL-MCNC: 6.7 G/DL (ref 6–8.5)
PROT UR STRIP-MCNC: NEGATIVE MG/DL
RBC # UR STRIP: NEGATIVE /UL
SP GR UR: 1.02 (ref 1–1.03)
TRIGL SERPL-MCNC: 112 MG/DL (ref 0–150)
UROBILINOGEN UR QL: NORMAL
VLDLC SERPL-MCNC: 20 MG/DL (ref 5–40)

## 2022-10-11 PROCEDURE — 81003 URINALYSIS AUTO W/O SCOPE: CPT | Performed by: STUDENT IN AN ORGANIZED HEALTH CARE EDUCATION/TRAINING PROGRAM

## 2022-10-11 PROCEDURE — 80061 LIPID PANEL: CPT

## 2022-10-11 PROCEDURE — 99214 OFFICE O/P EST MOD 30 MIN: CPT | Performed by: STUDENT IN AN ORGANIZED HEALTH CARE EDUCATION/TRAINING PROGRAM

## 2022-10-11 PROCEDURE — 36415 COLL VENOUS BLD VENIPUNCTURE: CPT

## 2022-10-11 PROCEDURE — 80076 HEPATIC FUNCTION PANEL: CPT

## 2022-10-11 RX ORDER — SULFAMETHOXAZOLE AND TRIMETHOPRIM 800; 160 MG/1; MG/1
1 TABLET ORAL 2 TIMES DAILY
Qty: 6 TABLET | Refills: 0 | Status: SHIPPED | OUTPATIENT
Start: 2022-10-11 | End: 2023-03-15

## 2022-10-11 NOTE — PROGRESS NOTES
"Chief Complaint  Cystitis (Possible infection)    History of Present Illness     She says she feels urgency and frequency. No dysuria.   No fever or new back pain.       The following portions of the patient's history were reviewed and updated as appropriate: allergies, current medications, past family history, past medical history, past social history, past surgical history, and problem list.    OBJECTIVE:  /82   Pulse 68   Temp 98.4 °F (36.9 °C)   Resp 24   Ht 157.5 cm (62\")   Wt 91 kg (200 lb 9.6 oz)   SpO2 98%   BMI 36.69 kg/m²       Physical Exam  Constitutional:       General: She is not in acute distress.     Appearance: Normal appearance.   HENT:      Head: Normocephalic and atraumatic.   Eyes:      Extraocular Movements: Extraocular movements intact.   Cardiovascular:      Rate and Rhythm: Normal rate and regular rhythm.      Heart sounds: No murmur heard.  Pulmonary:      Effort: Pulmonary effort is normal. No respiratory distress.      Breath sounds: Normal breath sounds. No stridor. No wheezing, rhonchi or rales.   Abdominal:      Palpations: Abdomen is soft.      Tenderness: There is no abdominal tenderness. There is no right CVA tenderness, left CVA tenderness, guarding or rebound.   Skin:     Findings: No rash.   Neurological:      General: No focal deficit present.      Mental Status: She is alert.   Psychiatric:         Mood and Affect: Mood normal.                    Assessment and Plan   Diagnoses and all orders for this visit:    1. Urinary tract infection without hematuria, site unspecified (Primary)  -     POCT urinalysis dipstick, automated    Other orders  -     sulfamethoxazole-trimethoprim (Bactrim DS) 800-160 MG per tablet; Take 1 tablet by mouth 2 (Two) Times a Day.  Dispense: 6 tablet; Refill: 0      Call if sx do not improve.    Return if symptoms worsen or fail to improve, for Next scheduled follow up.       Shirin Dave D.O.  [facility]   "

## 2022-10-12 ENCOUNTER — TELEPHONE (OUTPATIENT)
Dept: FAMILY MEDICINE CLINIC | Facility: CLINIC | Age: 57
End: 2022-10-12

## 2022-10-12 NOTE — TELEPHONE ENCOUNTER
LVM for pt to return call to office. Office number given.         HUB ok to read:      Cholesterol looks so much better and liver tests are normal

## 2022-11-02 ENCOUNTER — HOSPITAL ENCOUNTER (OUTPATIENT)
Dept: NUTRITION | Facility: HOSPITAL | Age: 57
Setting detail: RECURRING SERIES
Discharge: HOME | End: 2022-11-02

## 2022-11-02 PROCEDURE — 97803 MED NUTRITION INDIV SUBSEQ: CPT

## 2022-11-02 NOTE — PROGRESS NOTES
"Adult Outpatient Nutrition    Patient Name:  Jessika Sousa  YOB: 1965  MRN: [id number]    Assessment Date:  11/2/2022    RD spent a total of 30 minutes with patient today.      Pt has seen a dramatic difference in her symptoms. She been good about cutting out onions and garlic over the past 4 weeks. There's been no urgency or diarrhea spells in the last 4 weeks as well. Pt was great about prioritizing protein over the elimination phase and didn't have any issues. Pt requested to meet again in January. RD will call pt to set up cont f/u when January template is open. At that f/u, we will retest stomach acid as well. RD suggested increasing apple cider vinegar to 2 tsps over the next few weeks.      Reason for visit:      Patient is a 57 y/o F who is referred today for weight loss and IBS.      Session Details:      Attendees: Patient      Anthropometrics:            Ht Readings from Last 1 Encounters:   04/29/22 157.5 cm (62\")            Wt Readings from Last 1 Encounters:   04/29/22 94.1 kg (207 lb 6.4 oz)            BMI Readings from Last 1 Encounters:   04/29/22 37.93 kg/m²         Pertinent Medications/Supplements:     Vitamin b12  Vitamin D     Discussion:     Discussed basic principles of low-FODMAP diet and the potential relationship between food and symptoms. Discussed process of elimination phase (to remove all potentially problematic foods) and challenge phase (to identify individual's trigger foods) in order to improve quality of life, nutritional value of meals, and decrease negative symptoms. Reviewed list of low-FODMAP foods that are approved for challenge  phase. Demonstrated how to use resources to plan balanced meals. Patient was encouraged to follow chalenge phase for 4-6 weeks with adding 1 new higher FODMAP food a day in small quanities.     Goals:     1. Adding back higher FODMAP foods, 1 at a time, in small quanities during the challenge phase.               Total " of 30 minutes spent with patient on nutrition counseling. Education based on Academy of Nutrition and Dietetics guidelines. Patient was provided with RD's contact information. No follow-up is scheduled at this time, pt requested a follow-up in January but template is not yet open yet. RD will call pt when template for January is open.  Thank you for this referral.    Electronically signed by:  Floridalma Snyder RD  11/02/22 13:38 EDT

## 2022-12-07 RX ORDER — ATORVASTATIN CALCIUM 20 MG/1
20 TABLET, FILM COATED ORAL DAILY
Qty: 90 TABLET | Refills: 0 | Status: SHIPPED | OUTPATIENT
Start: 2022-12-07 | End: 2023-02-10

## 2023-02-04 DIAGNOSIS — F33.0 MILD EPISODE OF RECURRENT DEPRESSIVE DISORDER: Chronic | ICD-10-CM

## 2023-02-06 RX ORDER — CITALOPRAM 20 MG/1
20 TABLET ORAL DAILY
Qty: 90 TABLET | Refills: 1 | Status: SHIPPED | OUTPATIENT
Start: 2023-02-06

## 2023-02-10 RX ORDER — ATORVASTATIN CALCIUM 20 MG/1
20 TABLET, FILM COATED ORAL DAILY
Qty: 90 TABLET | Refills: 0 | Status: SHIPPED | OUTPATIENT
Start: 2023-02-10

## 2023-03-15 ENCOUNTER — LAB (OUTPATIENT)
Dept: LAB | Facility: HOSPITAL | Age: 58
End: 2023-03-15
Payer: COMMERCIAL

## 2023-03-15 ENCOUNTER — OFFICE VISIT (OUTPATIENT)
Dept: FAMILY MEDICINE CLINIC | Facility: CLINIC | Age: 58
End: 2023-03-15
Payer: COMMERCIAL

## 2023-03-15 VITALS
OXYGEN SATURATION: 97 % | BODY MASS INDEX: 37.36 KG/M2 | HEIGHT: 62 IN | TEMPERATURE: 98.6 F | HEART RATE: 104 BPM | RESPIRATION RATE: 20 BRPM | WEIGHT: 203 LBS | SYSTOLIC BLOOD PRESSURE: 138 MMHG | DIASTOLIC BLOOD PRESSURE: 78 MMHG

## 2023-03-15 DIAGNOSIS — R13.10 DYSPHAGIA, UNSPECIFIED TYPE: ICD-10-CM

## 2023-03-15 DIAGNOSIS — R10.9 STOMACH PAIN: ICD-10-CM

## 2023-03-15 DIAGNOSIS — E78.49 OTHER HYPERLIPIDEMIA: Primary | ICD-10-CM

## 2023-03-15 DIAGNOSIS — M54.9 BACK PAIN, UNSPECIFIED BACK LOCATION, UNSPECIFIED BACK PAIN LATERALITY, UNSPECIFIED CHRONICITY: ICD-10-CM

## 2023-03-15 DIAGNOSIS — E78.49 OTHER HYPERLIPIDEMIA: ICD-10-CM

## 2023-03-15 PROCEDURE — 80050 GENERAL HEALTH PANEL: CPT

## 2023-03-15 PROCEDURE — 99214 OFFICE O/P EST MOD 30 MIN: CPT | Performed by: STUDENT IN AN ORGANIZED HEALTH CARE EDUCATION/TRAINING PROGRAM

## 2023-03-15 PROCEDURE — 82607 VITAMIN B-12: CPT

## 2023-03-15 PROCEDURE — 36415 COLL VENOUS BLD VENIPUNCTURE: CPT

## 2023-03-15 PROCEDURE — 82306 VITAMIN D 25 HYDROXY: CPT

## 2023-03-15 PROCEDURE — 83036 HEMOGLOBIN GLYCOSYLATED A1C: CPT

## 2023-03-15 PROCEDURE — 80061 LIPID PANEL: CPT

## 2023-03-15 PROCEDURE — 83690 ASSAY OF LIPASE: CPT

## 2023-03-15 PROCEDURE — 83013 H PYLORI (C-13) BREATH: CPT | Performed by: STUDENT IN AN ORGANIZED HEALTH CARE EDUCATION/TRAINING PROGRAM

## 2023-03-15 RX ORDER — FEXOFENADINE HCL AND PSEUDOEPHEDRINE HCI 180; 240 MG/1; MG/1
1 TABLET, EXTENDED RELEASE ORAL DAILY
Qty: 30 TABLET | Refills: 1 | Status: SHIPPED | OUTPATIENT
Start: 2023-03-15

## 2023-03-15 RX ORDER — PANTOPRAZOLE SODIUM 40 MG/1
40 TABLET, DELAYED RELEASE ORAL DAILY
Qty: 30 TABLET | Refills: 1 | Status: SHIPPED | OUTPATIENT
Start: 2023-03-15 | End: 2023-03-15

## 2023-03-15 RX ORDER — PANTOPRAZOLE SODIUM 40 MG/1
40 TABLET, DELAYED RELEASE ORAL DAILY
Qty: 90 TABLET | Refills: 2 | Status: SHIPPED | OUTPATIENT
Start: 2023-03-15

## 2023-03-15 RX ORDER — CYCLOBENZAPRINE HCL 10 MG
5 TABLET ORAL NIGHTLY PRN
Qty: 30 TABLET | Refills: 0 | Status: SHIPPED | OUTPATIENT
Start: 2023-03-15

## 2023-03-15 NOTE — PROGRESS NOTES
"Chief Complaint  Urinary Tract Infection (Follow up , BACK PAIN AND ABDOMINAL , pt wants prescription for allegra d/) and Hyperlipidemia (Follow up )    History of Present Illness     uti  This has resolved and she feels asymptomatic.       hld  Tolerating statin.       She does have pain in her upper epigastrum that radiated to her back  This comes and goes and has been going on for over a year   Yoga and pilates have not helped.   She noticed this after drinking cranberry juice yesterday but it does not always happen after she eats  She does have heartburn and will feel burping up breakfast during the afternoon.  She will notice muscle pain in her thoracic spine as well when standing  Bowel movements are normal since starting on medication.   No bloody or black stool  No fever or vomiting   No unintentional weight loss,  LIZ, family history of upper gi cancer.   She will notice some feeling like she cannot swallow well and sometimes it is difficult to get the food down.       Allergies  She would like claritin d    The following portions of the patient's history were reviewed and updated as appropriate: allergies, current medications, past family history, past medical history, past social history, past surgical history, and problem list.    OBJECTIVE:  /78   Pulse 104   Temp 98.6 °F (37 °C)   Resp 20   Ht 157.5 cm (62\")   Wt 92.1 kg (203 lb)   SpO2 97%   BMI 37.13 kg/m²       Physical Exam  Constitutional:       General: She is not in acute distress.     Appearance: Normal appearance.   HENT:      Head: Normocephalic and atraumatic.   Eyes:      Extraocular Movements: Extraocular movements intact.   Cardiovascular:      Rate and Rhythm: Normal rate and regular rhythm.      Heart sounds: No murmur heard.  Pulmonary:      Effort: Pulmonary effort is normal. No respiratory distress.      Breath sounds: Normal breath sounds. No stridor. No wheezing, rhonchi or rales.   Abdominal:      General: Bowel " sounds are normal.      Palpations: Abdomen is soft.      Tenderness: There is abdominal tenderness (mildly tender in epigastrum, right and left upper quadrants.). There is no guarding or rebound.      Hernia: No hernia is present.   Musculoskeletal:      Comments: No pain on palpation of back but she point to the affected area being just left of the thoracic spine   Skin:     Findings: No rash.   Neurological:      General: No focal deficit present.      Mental Status: She is alert.   Psychiatric:         Mood and Affect: Mood normal.                    Assessment and Plan   Diagnoses and all orders for this visit:    1. Other hyperlipidemia (Primary)  -     CBC (No Diff); Future  -     Comprehensive Metabolic Panel; Future  -     Lipid Panel; Future  -     TSH Rfx On Abnormal To Free T4; Future  -     Hemoglobin A1c; Future  -     Vitamin D,25-Hydroxy; Future  -     Vitamin B12; Future    2. Stomach pain  -     Lipase; Future  -     H. Pylori Breath Test - Breath, Lung  -     NM HIDA SCAN WITH PHARMACOLOGICAL INTERVENTION; Future    3. Dysphagia, unspecified type  -     Ambulatory referral for Screening EGD    4. Back pain, unspecified back location, unspecified back pain laterality, unspecified chronicity  -     XR Spine Thoracic 3 View (In Office)    Other orders  -     pantoprazole (PROTONIX) 40 MG EC tablet; Take 1 tablet by mouth Daily.  Dispense: 30 tablet; Refill: 1  -     fexofenadine-pseudoephedrine (Allegra-D Allergy & Congestion) 180-240 MG per 24 hr tablet; Take 1 tablet by mouth Daily.  Dispense: 30 tablet; Refill: 1  -     cyclobenzaprine (FLEXERIL) 10 MG tablet; Take 0.5 tablets by mouth At Night As Needed for Muscle Spasms.  Dispense: 30 tablet; Refill: 0      Will treat reflux with ppi for 6-8 weeks. Send for egd given dysphagia.   Check h pylori and labs per above.   Check hida scan given history of diarrhea with ruq tenderness. US of gb looked normal.   ER for any new/worsening symptoms.      Check back xray. Will give flexeril for pain prn. Counseled on sedation to take at night only and not if driving. She plans to see chiropractor for this.     Return in about 6 weeks (around 4/26/2023).       Shirin Dave D.O.  [facility]

## 2023-03-16 LAB
25(OH)D3 SERPL-MCNC: 24.3 NG/ML (ref 30–100)
ALBUMIN SERPL-MCNC: 4.8 G/DL (ref 3.5–5.2)
ALBUMIN/GLOB SERPL: 1.7 G/DL
ALP SERPL-CCNC: 63 U/L (ref 39–117)
ALT SERPL W P-5'-P-CCNC: 28 U/L (ref 1–33)
ANION GAP SERPL CALCULATED.3IONS-SCNC: 14.6 MMOL/L (ref 5–15)
AST SERPL-CCNC: 27 U/L (ref 1–32)
BILIRUB SERPL-MCNC: 0.4 MG/DL (ref 0–1.2)
BUN SERPL-MCNC: 13 MG/DL (ref 6–20)
BUN/CREAT SERPL: 17.3 (ref 7–25)
CALCIUM SPEC-SCNC: 10 MG/DL (ref 8.6–10.5)
CHLORIDE SERPL-SCNC: 103 MMOL/L (ref 98–107)
CHOLEST SERPL-MCNC: 185 MG/DL (ref 0–200)
CO2 SERPL-SCNC: 21.4 MMOL/L (ref 22–29)
CREAT SERPL-MCNC: 0.75 MG/DL (ref 0.57–1)
DEPRECATED RDW RBC AUTO: 42.4 FL (ref 37–54)
EGFRCR SERPLBLD CKD-EPI 2021: 93 ML/MIN/1.73
ERYTHROCYTE [DISTWIDTH] IN BLOOD BY AUTOMATED COUNT: 13.4 % (ref 12.3–15.4)
GLOBULIN UR ELPH-MCNC: 2.8 GM/DL
GLUCOSE SERPL-MCNC: 70 MG/DL (ref 65–99)
HBA1C MFR BLD: 5.8 % (ref 4.8–5.6)
HCT VFR BLD AUTO: 46.5 % (ref 34–46.6)
HDLC SERPL-MCNC: 56 MG/DL (ref 40–60)
HGB BLD-MCNC: 15.1 G/DL (ref 12–15.9)
LDLC SERPL CALC-MCNC: 111 MG/DL (ref 0–100)
LDLC/HDLC SERPL: 1.95 {RATIO}
LIPASE SERPL-CCNC: 41 U/L (ref 13–60)
MCH RBC QN AUTO: 28.3 PG (ref 26.6–33)
MCHC RBC AUTO-ENTMCNC: 32.5 G/DL (ref 31.5–35.7)
MCV RBC AUTO: 87.2 FL (ref 79–97)
PLATELET # BLD AUTO: 342 10*3/MM3 (ref 140–450)
PMV BLD AUTO: 10.5 FL (ref 6–12)
POTASSIUM SERPL-SCNC: 4.5 MMOL/L (ref 3.5–5.2)
PROT SERPL-MCNC: 7.6 G/DL (ref 6–8.5)
RBC # BLD AUTO: 5.33 10*6/MM3 (ref 3.77–5.28)
SODIUM SERPL-SCNC: 139 MMOL/L (ref 136–145)
TRIGL SERPL-MCNC: 99 MG/DL (ref 0–150)
TSH SERPL DL<=0.05 MIU/L-ACNC: 1.96 UIU/ML (ref 0.27–4.2)
VIT B12 BLD-MCNC: 416 PG/ML (ref 211–946)
VLDLC SERPL-MCNC: 18 MG/DL (ref 5–40)
WBC NRBC COR # BLD: 6.86 10*3/MM3 (ref 3.4–10.8)

## 2023-03-17 DIAGNOSIS — M43.8X9 ACQUIRED COMPRESSION DEFORMITY OF VERTEBRA: Primary | ICD-10-CM

## 2023-03-17 LAB — UREA BREATH TEST QL: NEGATIVE

## 2023-03-18 ENCOUNTER — TELEPHONE (OUTPATIENT)
Dept: FAMILY MEDICINE CLINIC | Facility: CLINIC | Age: 58
End: 2023-03-18
Payer: COMMERCIAL

## 2023-03-18 NOTE — PROGRESS NOTES
Called Jessika Sousa [facility] for pt to call back.    Left encounter for hub/front to read

## 2023-03-18 NOTE — TELEPHONE ENCOUNTER
Hub/front can read ----- Message from Shirin Dave DO sent at 3/17/2023  9:19 PM EDT -----  Please let the patient know her labs are stable but show:  Vitamin D is low. Make sure to take 800-1000 units daily over the counter.   Please let the patient know that the diabetic test (a1c) is elevated into the prediabetic not diabetic range. Recommend diet with lean meat fish and vegetables and decreased sugar/carbs with daily exercise. Can refer to phone dietitian if this helps let me know. Recommend to recheck this in 3-6 months.

## 2023-03-18 NOTE — TELEPHONE ENCOUNTER
Hub/front can read ----- Message from Shirin Dave DO sent at 3/17/2023  9:23 PM EDT -----  Please call the patient to let her know that she has signs of age related arthritis in her spine where she has been experiencing pain. Lower down in her lumbar spine she has what is called a wedge compression deformity. This can been associated with osteoporosis or weakened bones. I have ordered a bone density scan to evaluate for this. If she ever has any pain in her lower back just schedule a visit to further discuss.

## 2023-03-18 NOTE — PROGRESS NOTES
Called Jessika Sousa [facility] for pt to call back.    Left encounter for hub/front can read

## 2023-03-23 DIAGNOSIS — R19.7 DIARRHEA, UNSPECIFIED TYPE: Primary | ICD-10-CM

## 2023-05-12 ENCOUNTER — HOSPITAL ENCOUNTER (OUTPATIENT)
Dept: ULTRASOUND IMAGING | Facility: HOSPITAL | Age: 58
Discharge: HOME | End: 2023-05-12
Admitting: STUDENT IN AN ORGANIZED HEALTH CARE EDUCATION/TRAINING PROGRAM
Payer: COMMERCIAL

## 2023-05-12 DIAGNOSIS — R19.7 DIARRHEA, UNSPECIFIED TYPE: ICD-10-CM

## 2023-05-12 PROCEDURE — 76705 ECHO EXAM OF ABDOMEN: CPT

## 2023-05-17 ENCOUNTER — OFFICE VISIT (OUTPATIENT)
Dept: FAMILY MEDICINE CLINIC | Facility: CLINIC | Age: 58
End: 2023-05-17
Payer: COMMERCIAL

## 2023-05-17 VITALS
SYSTOLIC BLOOD PRESSURE: 132 MMHG | DIASTOLIC BLOOD PRESSURE: 74 MMHG | OXYGEN SATURATION: 97 % | TEMPERATURE: 98 F | HEART RATE: 64 BPM | RESPIRATION RATE: 22 BRPM | WEIGHT: 194 LBS | BODY MASS INDEX: 35.7 KG/M2 | HEIGHT: 62 IN

## 2023-05-17 DIAGNOSIS — K21.9 GASTROESOPHAGEAL REFLUX DISEASE WITHOUT ESOPHAGITIS: Primary | ICD-10-CM

## 2023-05-17 PROCEDURE — 99213 OFFICE O/P EST LOW 20 MIN: CPT | Performed by: STUDENT IN AN ORGANIZED HEALTH CARE EDUCATION/TRAINING PROGRAM

## 2023-05-17 NOTE — ASSESSMENT & PLAN NOTE
Continue ppi. Counseled on risk of infection or osteoporosis with this med long term. She will try to discontinue but if symptoms persist will continue once daily.

## 2023-05-17 NOTE — PROGRESS NOTES
"Chief Complaint  Medication Reaction (Follow up on stomach meds )    History of Present Illness     Us looked okay. hida scan pending at this time. She feels the ppi was unbelievable and worked great and took the heart burn away. She still has some stomach tightness in her epigastrum. No vomiting. No new symptoms (see last note). egd scheduled.     predm  She has lost 9 lbs since last visit.     The following portions of the patient's history were reviewed and updated as appropriate: allergies, current medications, past family history, past medical history, past social history, past surgical history, and problem list.    OBJECTIVE:  /74   Pulse 64   Temp 98 °F (36.7 °C)   Resp 22   Ht 157.5 cm (62\")   Wt 88 kg (194 lb)   SpO2 97%   BMI 35.48 kg/m²       Physical Exam  Constitutional:       General: She is not in acute distress.     Appearance: Normal appearance.   HENT:      Head: Normocephalic and atraumatic.   Eyes:      Extraocular Movements: Extraocular movements intact.   Cardiovascular:      Rate and Rhythm: Normal rate and regular rhythm.      Heart sounds: No murmur heard.  Pulmonary:      Effort: Pulmonary effort is normal. No respiratory distress.      Breath sounds: Normal breath sounds. No stridor. No wheezing, rhonchi or rales.   Abdominal:      General: Bowel sounds are normal.      Palpations: Abdomen is soft.      Tenderness: There is no abdominal tenderness. There is no guarding or rebound.   Skin:     Findings: No rash.   Neurological:      General: No focal deficit present.      Mental Status: She is alert.   Psychiatric:         Mood and Affect: Mood normal.                    Assessment and Plan   Diagnoses and all orders for this visit:    1. Gastroesophageal reflux disease without esophagitis (Primary)  Assessment & Plan:  Continue ppi. Counseled on risk of infection or osteoporosis with this med long term. She will try to discontinue but if symptoms persist will continue once " daily.          We have discussed not eating within 3 hours of bed and avoidance of triggers such as spicy foods caffeine. She will go for egd and hida scan. Advised er for any worsening or new symptoms.    Return if symptoms worsen or fail to improve, for Next scheduled follow up.       Shirin Dave D.O.  [facility]

## 2023-05-18 DIAGNOSIS — R10.9 STOMACH PAIN: Primary | ICD-10-CM

## 2023-06-06 RX ORDER — SODIUM, POTASSIUM,MAG SULFATES 17.5-3.13G
1 SOLUTION, RECONSTITUTED, ORAL ORAL TAKE AS DIRECTED
Qty: 354 ML | Refills: 0 | Status: SHIPPED | OUTPATIENT
Start: 2023-06-06

## 2023-06-13 ENCOUNTER — OUTSIDE FACILITY SERVICE (OUTPATIENT)
Dept: GASTROENTEROLOGY | Facility: CLINIC | Age: 58
End: 2023-06-13
Payer: COMMERCIAL

## 2023-08-11 DIAGNOSIS — F33.0 MILD EPISODE OF RECURRENT DEPRESSIVE DISORDER: Chronic | ICD-10-CM

## 2023-08-11 RX ORDER — CITALOPRAM 20 MG/1
20 TABLET ORAL DAILY
Qty: 90 TABLET | Refills: 1 | Status: SHIPPED | OUTPATIENT
Start: 2023-08-11

## 2023-08-29 ENCOUNTER — HOSPITAL ENCOUNTER (OUTPATIENT)
Dept: BONE DENSITY | Facility: HOSPITAL | Age: 58
Discharge: HOME | End: 2023-08-29
Admitting: STUDENT IN AN ORGANIZED HEALTH CARE EDUCATION/TRAINING PROGRAM
Payer: COMMERCIAL

## 2023-08-29 DIAGNOSIS — M43.8X9 ACQUIRED COMPRESSION DEFORMITY OF VERTEBRA: ICD-10-CM

## 2023-08-29 PROCEDURE — 77080 DXA BONE DENSITY AXIAL: CPT

## 2023-09-11 ENCOUNTER — TELEPHONE (OUTPATIENT)
Dept: FAMILY MEDICINE CLINIC | Facility: CLINIC | Age: 58
End: 2023-09-11
Payer: COMMERCIAL

## 2023-09-12 ENCOUNTER — OFFICE VISIT (OUTPATIENT)
Dept: GASTROENTEROLOGY | Facility: CLINIC | Age: 58
End: 2023-09-12
Payer: COMMERCIAL

## 2023-09-12 VITALS
BODY MASS INDEX: 35.85 KG/M2 | TEMPERATURE: 97.3 F | HEIGHT: 62 IN | DIASTOLIC BLOOD PRESSURE: 82 MMHG | SYSTOLIC BLOOD PRESSURE: 136 MMHG | WEIGHT: 194.8 LBS | HEART RATE: 88 BPM | OXYGEN SATURATION: 97 %

## 2023-09-12 DIAGNOSIS — K21.00 GASTROESOPHAGEAL REFLUX DISEASE WITH ESOPHAGITIS WITHOUT HEMORRHAGE: Primary | ICD-10-CM

## 2023-09-12 DIAGNOSIS — K82.8 BILIARY DYSKINESIA: ICD-10-CM

## 2023-09-12 RX ORDER — PRASTERONE 6.5 MG/1
INSERT VAGINAL
COMMUNITY

## 2023-09-12 NOTE — PROGRESS NOTES
"     Follow Up      Patient Name: Jessika Sousa  : 1965   MRN: [id number]     Chief Complaint:    Chief Complaint   Patient presents with    Follow-up       History of Present Illness: Jessika Sousa is a 57 y.o. female, PMH includes HL, IBS, anxiety and depression, who is here today for post procedure follow up.  is with patient for visit today.    Pt is taking pantoprazole 40mg daily. She notes overall improvement of \"vice \" upper abdominal pain. She continues to experience loose stools and fecal urgency postprandially.   Patient denies associated fever, chills, nausea, vomiting, constipation, hematemesis, dysphagia, hematochezia, melena, bloating, weight loss or gain, dysuria, jaundice or bruising.    HIDA 2023: Borderline low gallbladder ejection fraction of 32%. Pain, nausea was reproduced with CCK injection.     She was evaluated by Dr. Park 23 and CCY was recommended. Surgery is scheduled for 2023.     EGD / CSY 2023 with Dr. Khalil. Normal upper / middle third of esophagus. LA Grade B esophagitis. Small hiatal hernia. Normal stomach and duodenum. Bx negative for EoE, H Pylori, celiac disease, intestinal metaplasia or dysplasia. Good bowel prep conditions. Normal terminal ileum. Diverticulosis in sigmoid colon. Nonbleeding internal hemorrhoids. Recommend repeat CSY in 3 years.     Subjective      Review of Systems:   Review of Systems   Constitutional:  Negative for appetite change, chills, diaphoresis, fatigue, fever, unexpected weight gain and unexpected weight loss.   HENT:  Negative for drooling, facial swelling, mouth sores, rhinorrhea, sore throat, tinnitus, trouble swallowing and voice change.    Eyes: Negative.    Respiratory:  Negative for cough, chest tightness and shortness of breath.    Cardiovascular:  Negative for chest pain.   Gastrointestinal:  Positive for abdominal distention, abdominal pain (upper, improved), diarrhea and GERD (improved). Negative " for blood in stool, constipation, nausea, vomiting and indigestion.   Genitourinary:  Negative for dysuria, flank pain, hematuria and pelvic pain.   Musculoskeletal:  Negative for arthralgias and myalgias.   Skin:  Negative for color change, pallor and rash.   Neurological:  Negative for dizziness, tremors, syncope, weakness and numbness.   Psychiatric/Behavioral:  Negative for hallucinations and sleep disturbance. The patient is not nervous/anxious.    All other systems reviewed and are negative.    Medications:     Current Outpatient Medications:     atorvastatin (LIPITOR) 20 MG tablet, Take 1 tablet by mouth Daily., Disp: 90 tablet, Rfl: 1    cholestyramine (QUESTRAN) 4 g packet, MIX AND DRINK 1 PACKET BY MOUTH DAILY AS NEEDED FOR DIARRHEA, Disp: 90 each, Rfl: 0    citalopram (CeleXA) 20 MG tablet, TAKE 1 TABLET BY MOUTH DAILY, Disp: 90 tablet, Rfl: 1    cyclobenzaprine (FLEXERIL) 10 MG tablet, Take 0.5 tablets by mouth At Night As Needed for Muscle Spasms., Disp: 30 tablet, Rfl: 0    fexofenadine-pseudoephedrine (Allegra-D Allergy & Congestion) 180-240 MG per 24 hr tablet, Take 1 tablet by mouth Daily., Disp: 30 tablet, Rfl: 1    ipratropium (ATROVENT) 0.03 % nasal spray, 2 sprays into the nostril(s) as directed by provider Every 12 (Twelve) Hours., Disp: 1 each, Rfl: 3    pantoprazole (PROTONIX) 40 MG EC tablet, TAKE 1 TABLET BY MOUTH DAILY, Disp: 90 tablet, Rfl: 2    Prasterone (Intrarosa) 6.5 MG insert, , Disp: , Rfl:     sodium-potassium-magnesium sulfates (Suprep Bowel Prep Kit) 17.5-3.13-1.6 GM/177ML solution oral solution, Take 1 bottle by mouth Take As Directed. Follow instructions that were mailed to your home. If you didn't receive these call [phone]., Disp: 354 mL, Rfl: 0    vitamin B-12 (CYANOCOBALAMIN) 1000 MCG tablet, Take 1 tablet by mouth Daily., Disp: 90 tablet, Rfl: 3    Current Facility-Administered Medications:     cyanocobalamin injection 1,000 mcg, 1,000 mcg, Intramuscular, Q28 Days,  "[place], Kajal A, DO, 1,000 mcg at 09/10/20 1041    cyanocobalamin injection 1,000 mcg, 1,000 mcg, Intramuscular, Q28 Days, [place], Kajal A, DO, 1,000 mcg at 01/10/20 1242    cyanocobalamin injection 1,000 mcg, 1,000 mcg, Intramuscular, Q28 Days, [place], Kajal A, DO, 1,000 mcg at 04/21/21 1152    cyanocobalamin injection 1,000 mcg, 1,000 mcg, Intramuscular, Q28 Days, [place], Kajal A, DO, 1,000 mcg at 11/01/21 1120    Allergies:   No Known Allergies    Social History:   Social History     Socioeconomic History    Marital status:    Tobacco Use    Smoking status: Never    Smokeless tobacco: Never   Vaping Use    Vaping Use: Never used   Substance and Sexual Activity    Alcohol use: Yes     Comment: OCC    Drug use: No    Sexual activity: Defer        Surgical History:   Past Surgical History:   Procedure Laterality Date    BREAST BIOPSY N/A     side unknown    FOOT SURGERY      Bilateral Foot Spur Remove         Medical History:   Past Medical History:   Diagnosis Date    Irritable bowel syndrome         Objective     Physical Exam:  Vital Signs:   Vitals:    09/12/23 1055   Height: 157.5 cm (62.01\")     Body mass index is 35.69 kg/m².     Physical Exam  Vitals and nursing note reviewed.   Constitutional:       Appearance: Normal appearance. She is normal weight. She is not ill-appearing or diaphoretic.      Comments: BMI 35.62.    HENT:      Head: Normocephalic and atraumatic.      Right Ear: External ear normal.      Left Ear: External ear normal.      Nose: Nose normal.      Mouth/Throat:      Mouth: Mucous membranes are moist.      Pharynx: Oropharynx is clear.   Eyes:      Conjunctiva/sclera: Conjunctivae normal.      Pupils: Pupils are equal, round, and reactive to light.   Neck:      Thyroid: No thyromegaly.   Cardiovascular:      Rate and Rhythm: Normal rate and regular rhythm.      Pulses: Normal pulses.      Heart sounds: Normal heart sounds.   Pulmonary:      Effort: Pulmonary effort is normal.      " Breath sounds: Normal breath sounds.   Abdominal:      General: Abdomen is flat. Bowel sounds are normal. There is no distension.      Tenderness: There is no abdominal tenderness.   Musculoskeletal:         General: Normal range of motion.      Cervical back: Normal range of motion and neck supple.   Skin:     General: Skin is warm and dry.   Neurological:      General: No focal deficit present.      Mental Status: She is oriented to person, place, and time.   Psychiatric:         Mood and Affect: Mood normal.       Assessment / Plan      Assessment/Plan:   There are no diagnoses linked to this encounter.     GERD with LA Grade B esophagitis  Biliary dyskinesia   - continue pantoprazole 40mg daily   - pt given GERD diet instructions, advised to avoid GI irritants such as caffeine, carbonation, EtOH, tobacco, chocolate, peppermint, acid-based foods   - endoscopy and pathology reports reviewed   - previous imaging, consultation notes reviewed   - follow up as [facility]   - follow up in clinic after completion of above studies   - call clinic at any time for questions or new / worsened sx    Follow Up:   Return if symptoms worsen or fail to improve.    Plan of care reviewed with the patient at the conclusion of today's visit.  Education was provided regarding diagnosis, management, and any prescribed or recommended OTC medications.  Patient verbalized understanding of and agreement with management plan.     NOTE TO PATIENT: The 21st Century Cures Act makes medical notes like these available to patients in the interest of transparency. However, be advised this is a medical document. It is intended as peer to peer communication. It is written in medical language and may contain abbreviations or verbiage that are unfamiliar. It may appear blunt or direct. Medical documents are intended to carry relevant information, facts as evident, and the clinical opinion of the practitioner.     Time Statement:    Discussed plan of care in detail with patient today. Patient verbally understands and agrees. I have spent 30 minutes reviewing available diagnostics, obtaining history, examining the patient, developing a treatment plan, and educating the patient on disease process and plan of care.     Frances Robb PA-C   [facility]

## 2023-10-05 ENCOUNTER — TRANSCRIBE ORDERS (OUTPATIENT)
Dept: ADMINISTRATIVE | Facility: HOSPITAL | Age: 58
End: 2023-10-05
Payer: COMMERCIAL

## 2023-10-05 DIAGNOSIS — Z12.31 SCREENING MAMMOGRAM FOR BREAST CANCER: Primary | ICD-10-CM

## 2023-10-09 ENCOUNTER — LAB (OUTPATIENT)
Dept: LAB | Facility: HOSPITAL | Age: 58
End: 2023-10-09
Payer: COMMERCIAL

## 2023-10-09 ENCOUNTER — OFFICE VISIT (OUTPATIENT)
Dept: FAMILY MEDICINE CLINIC | Facility: CLINIC | Age: 58
End: 2023-10-09
Payer: COMMERCIAL

## 2023-10-09 VITALS
RESPIRATION RATE: 21 BRPM | TEMPERATURE: 97.3 F | WEIGHT: 195 LBS | BODY MASS INDEX: 35.88 KG/M2 | DIASTOLIC BLOOD PRESSURE: 82 MMHG | SYSTOLIC BLOOD PRESSURE: 130 MMHG | HEART RATE: 82 BPM | OXYGEN SATURATION: 98 % | HEIGHT: 62 IN

## 2023-10-09 DIAGNOSIS — L98.9 SKIN LESION: ICD-10-CM

## 2023-10-09 DIAGNOSIS — E78.49 OTHER HYPERLIPIDEMIA: ICD-10-CM

## 2023-10-09 DIAGNOSIS — H91.8X9 OTHER SPECIFIED FORMS OF HEARING LOSS, UNSPECIFIED LATERALITY: ICD-10-CM

## 2023-10-09 DIAGNOSIS — R73.03 PREDIABETES: ICD-10-CM

## 2023-10-09 DIAGNOSIS — E78.49 OTHER HYPERLIPIDEMIA: Primary | ICD-10-CM

## 2023-10-09 LAB
25(OH)D3 SERPL-MCNC: 27 NG/ML (ref 30–100)
ALBUMIN SERPL-MCNC: 4.6 G/DL (ref 3.5–5.2)
ALBUMIN/GLOB SERPL: 1.7 G/DL
ALP SERPL-CCNC: 61 U/L (ref 39–117)
ALT SERPL W P-5'-P-CCNC: 24 U/L (ref 1–33)
ANION GAP SERPL CALCULATED.3IONS-SCNC: 11.1 MMOL/L (ref 5–15)
AST SERPL-CCNC: 23 U/L (ref 1–32)
BILIRUB SERPL-MCNC: 0.5 MG/DL (ref 0–1.2)
BUN SERPL-MCNC: 10 MG/DL (ref 6–20)
BUN/CREAT SERPL: 15.4 (ref 7–25)
CALCIUM SPEC-SCNC: 9.8 MG/DL (ref 8.6–10.5)
CHLORIDE SERPL-SCNC: 107 MMOL/L (ref 98–107)
CHOLEST SERPL-MCNC: 173 MG/DL (ref 0–200)
CO2 SERPL-SCNC: 23.9 MMOL/L (ref 22–29)
CREAT SERPL-MCNC: 0.65 MG/DL (ref 0.57–1)
DEPRECATED RDW RBC AUTO: 42.8 FL (ref 37–54)
EGFRCR SERPLBLD CKD-EPI 2021: 102.8 ML/MIN/1.73
ERYTHROCYTE [DISTWIDTH] IN BLOOD BY AUTOMATED COUNT: 13.5 % (ref 12.3–15.4)
GLOBULIN UR ELPH-MCNC: 2.7 GM/DL
GLUCOSE SERPL-MCNC: 79 MG/DL (ref 65–99)
HBA1C MFR BLD: 5.5 % (ref 4.8–5.6)
HCT VFR BLD AUTO: 44.9 % (ref 34–46.6)
HDLC SERPL-MCNC: 59 MG/DL (ref 40–60)
HGB BLD-MCNC: 15.1 G/DL (ref 12–15.9)
LDLC SERPL CALC-MCNC: 97 MG/DL (ref 0–100)
LDLC/HDLC SERPL: 1.62 {RATIO}
MCH RBC QN AUTO: 29.2 PG (ref 26.6–33)
MCHC RBC AUTO-ENTMCNC: 33.6 G/DL (ref 31.5–35.7)
MCV RBC AUTO: 86.7 FL (ref 79–97)
PLATELET # BLD AUTO: 282 10*3/MM3 (ref 140–450)
PMV BLD AUTO: 10.8 FL (ref 6–12)
POTASSIUM SERPL-SCNC: 4 MMOL/L (ref 3.5–5.2)
PROT SERPL-MCNC: 7.3 G/DL (ref 6–8.5)
RBC # BLD AUTO: 5.18 10*6/MM3 (ref 3.77–5.28)
SODIUM SERPL-SCNC: 142 MMOL/L (ref 136–145)
TRIGL SERPL-MCNC: 91 MG/DL (ref 0–150)
TSH SERPL DL<=0.05 MIU/L-ACNC: 1.43 UIU/ML (ref 0.27–4.2)
VIT B12 BLD-MCNC: 334 PG/ML (ref 211–946)
VLDLC SERPL-MCNC: 17 MG/DL (ref 5–40)
WBC NRBC COR # BLD: 7.43 10*3/MM3 (ref 3.4–10.8)

## 2023-10-09 PROCEDURE — 82306 VITAMIN D 25 HYDROXY: CPT

## 2023-10-09 PROCEDURE — 82607 VITAMIN B-12: CPT

## 2023-10-09 PROCEDURE — 83036 HEMOGLOBIN GLYCOSYLATED A1C: CPT

## 2023-10-09 PROCEDURE — 80061 LIPID PANEL: CPT

## 2023-10-09 PROCEDURE — 80050 GENERAL HEALTH PANEL: CPT

## 2023-10-09 NOTE — PROGRESS NOTES
"Chief Complaint  Annual Exam    History of Present Illness  Annual exam  Colonoscopy up to date   Mammogram scheduled   Pap smear scheduled with gyn in feb   Counseled on diet and exercise including limited sweets and sugars to focus on lean meat and vegetables. Counseled on 50 minutes of moderate exercise 3 times per week.   Recommend dental and eye exam  hiv hep c screening: she declines   a1c /lipid screening  covid vaccine  Shingles  Pneumonia  gardisil  tdap        The following portions of the patient's history were reviewed and updated as appropriate: allergies, current medications, past family history, past medical history, past social history, past surgical history, and problem list.    OBJECTIVE:  /82   Pulse 82   Temp 97.3 øF (36.3 øC) (Temporal)   Resp 21   Ht 157.5 cm (62.01\")   Wt 88.5 kg (195 lb)   SpO2 98%   BMI 35.66 kg/mý       Physical Exam  Constitutional:       General: She is not in acute distress.     Appearance: Normal appearance.   HENT:      Head: Normocephalic and atraumatic.   Eyes:      Extraocular Movements: Extraocular movements intact.   Cardiovascular:      Rate and Rhythm: Normal rate and regular rhythm.      Heart sounds: No murmur heard.  Pulmonary:      Effort: Pulmonary effort is normal. No respiratory distress.      Breath sounds: Normal breath sounds. No stridor. No wheezing, rhonchi or rales.   Skin:     Findings: No rash.   Neurological:      General: No focal deficit present.      Mental Status: She is alert.   Psychiatric:         Mood and Affect: Mood normal.                    Assessment and Plan   Diagnoses and all orders for this visit:    1. Other hyperlipidemia (Primary)  -     CBC (No Diff); Future  -     Comprehensive Metabolic Panel; Future  -     Lipid Panel; Future    2. Prediabetes  -     Hemoglobin A1c; Future  -     TSH Rfx On Abnormal To Free T4; Future  -     Vitamin B12; Future  -     Vitamin D,25-Hydroxy; Future    3. Other specified forms of " hearing loss, unspecified laterality  -     Ambulatory Referral to ENT (Otolaryngology)    4. Skin lesion  -     Ambulatory Referral to Dermatology    Other orders  -     Fluzone (or Fluarix & Flulaval for VFC) >6mos    Annual exam  Colonoscopy up to date   Mammogram scheduled   Pap smear scheduled with gyn in feb   Counseled on diet and exercise including limited sweets and sugars to focus on lean meat and vegetables. Counseled on 50 minutes of moderate exercise 3 times per week.   Recommend dental and eye exam  hiv hep c screening: she declines   Vaccines for age:  covid vaccine  Shingles  Pneumonia  tdap    Continue current meds.     Return in about 6 months (around 4/9/2024).       Shirin Dave D.O.  [facility] [place]

## 2023-10-10 ENCOUNTER — TELEPHONE (OUTPATIENT)
Dept: FAMILY MEDICINE CLINIC | Facility: CLINIC | Age: 58
End: 2023-10-10
Payer: COMMERCIAL

## 2023-10-10 NOTE — TELEPHONE ENCOUNTER
HUB TO READ  LV      Vitamin D is low. Make sure to take 800-1000 units daily over the counter.   Other labs are stable.

## 2023-10-12 ENCOUNTER — HOSPITAL ENCOUNTER (OUTPATIENT)
Dept: MAMMOGRAPHY | Facility: HOSPITAL | Age: 58
Discharge: HOME | End: 2023-10-12
Admitting: STUDENT IN AN ORGANIZED HEALTH CARE EDUCATION/TRAINING PROGRAM
Payer: COMMERCIAL

## 2023-10-12 DIAGNOSIS — Z12.31 SCREENING MAMMOGRAM FOR BREAST CANCER: ICD-10-CM

## 2023-10-12 PROCEDURE — 77063 BREAST TOMOSYNTHESIS BI: CPT

## 2023-10-12 PROCEDURE — 77067 SCR MAMMO BI INCL CAD: CPT

## 2023-11-06 ENCOUNTER — PRE-ADMISSION TESTING (OUTPATIENT)
Dept: PREADMISSION TESTING | Facility: HOSPITAL | Age: 58
End: 2023-11-06

## 2023-11-06 LAB
ALBUMIN SERPL-MCNC: 4.4 G/DL (ref 3.5–5.2)
ALBUMIN/GLOB SERPL: 1.8 G/DL
ALP SERPL-CCNC: 64 U/L (ref 39–117)
ALT SERPL W P-5'-P-CCNC: 26 U/L (ref 1–33)
ANION GAP SERPL CALCULATED.3IONS-SCNC: 7 MMOL/L (ref 5–15)
AST SERPL-CCNC: 20 U/L (ref 1–32)
BILIRUB SERPL-MCNC: 0.4 MG/DL (ref 0–1.2)
BUN SERPL-MCNC: 10 MG/DL (ref 6–20)
BUN/CREAT SERPL: 16.9 (ref 7–25)
CALCIUM SPEC-SCNC: 9.3 MG/DL (ref 8.6–10.5)
CHLORIDE SERPL-SCNC: 108 MMOL/L (ref 98–107)
CO2 SERPL-SCNC: 28 MMOL/L (ref 22–29)
CREAT SERPL-MCNC: 0.59 MG/DL (ref 0.57–1)
DEPRECATED RDW RBC AUTO: 44.7 FL (ref 37–54)
EGFRCR SERPLBLD CKD-EPI 2021: 105.3 ML/MIN/1.73
ERYTHROCYTE [DISTWIDTH] IN BLOOD BY AUTOMATED COUNT: 13.9 % (ref 12.3–15.4)
GLOBULIN UR ELPH-MCNC: 2.4 GM/DL
GLUCOSE SERPL-MCNC: 101 MG/DL (ref 65–99)
HCT VFR BLD AUTO: 41.6 % (ref 34–46.6)
HGB BLD-MCNC: 13.7 G/DL (ref 12–15.9)
MCH RBC QN AUTO: 28.8 PG (ref 26.6–33)
MCHC RBC AUTO-ENTMCNC: 32.9 G/DL (ref 31.5–35.7)
MCV RBC AUTO: 87.4 FL (ref 79–97)
PLATELET # BLD AUTO: 280 10*3/MM3 (ref 140–450)
PMV BLD AUTO: 9.6 FL (ref 6–12)
POTASSIUM SERPL-SCNC: 3.9 MMOL/L (ref 3.5–5.2)
PROT SERPL-MCNC: 6.8 G/DL (ref 6–8.5)
RBC # BLD AUTO: 4.76 10*6/MM3 (ref 3.77–5.28)
SODIUM SERPL-SCNC: 143 MMOL/L (ref 136–145)
WBC NRBC COR # BLD: 7.15 10*3/MM3 (ref 3.4–10.8)

## 2023-11-06 PROCEDURE — 80053 COMPREHEN METABOLIC PANEL: CPT

## 2023-11-06 PROCEDURE — 36415 COLL VENOUS BLD VENIPUNCTURE: CPT

## 2023-11-06 PROCEDURE — 85027 COMPLETE CBC AUTOMATED: CPT

## 2023-11-06 NOTE — PAT
An arrival time for procedure was not provided during PAT visit. If patient had any questions or concerns about their arrival time, they were instructed to contact their surgeon/physician.  Additionally, if the patient referred to an arrival time that was acquired from their my chart account, patient was encouraged to verify that time with their surgeon/physician. Arrival times are NOT provided in Pre Admission Testing Department.    Patient viewed general PAT education video as instructed in their preoperative information received from their surgeon.  Patient stated the general PAT education video was viewed in its entirety and survey completed.  Copies of PAT general education handouts (Incentive Spirometry, Meds to Beds Program, Patient Belongings, Pre-op skin preparation instructions, Blood Glucose testing, Visitor policy, Surgery FAQ, Code H) distributed to patient if not printed. Education related to the PAT pass and skin preparation for surgery (if applicable) completed in PAT as a reinforcement to PAT education video. Patient instructed to return PAT pass provided today as well as completed skin preparation sheet (if applicable) on the day of procedure.     Additionally if patient had not viewed video yet but intended to view it at home or in our waiting area, then referred them to the handout with QR code/link provided during PAT visit.  Instructed patient to complete survey after viewing the video in its entirety.  Encouraged patient/family to read PAT general education handouts thoroughly and notify PAT staff with any questions or concerns. Patient verbalized understanding of all information and priority content.    Patient denies any current skin issues.     CHART FAXED TO [facility]

## 2023-11-13 ENCOUNTER — LAB REQUISITION (OUTPATIENT)
Dept: LAB | Facility: HOSPITAL | Age: 58
End: 2023-11-13
Payer: COMMERCIAL

## 2023-11-13 DIAGNOSIS — K82.8 OTHER SPECIFIED DISEASES OF GALLBLADDER: ICD-10-CM

## 2023-11-13 PROCEDURE — 88304 TISSUE EXAM BY PATHOLOGIST: CPT | Performed by: SURGERY

## 2023-12-11 RX ORDER — ATORVASTATIN CALCIUM 20 MG/1
20 TABLET, FILM COATED ORAL DAILY
Qty: 90 TABLET | Refills: 0 | Status: SHIPPED | OUTPATIENT
Start: 2023-12-11

## 2023-12-11 RX ORDER — PANTOPRAZOLE SODIUM 40 MG/1
40 TABLET, DELAYED RELEASE ORAL DAILY
Qty: 90 TABLET | Refills: 0 | Status: SHIPPED | OUTPATIENT
Start: 2023-12-11

## 2024-01-26 ENCOUNTER — LAB (OUTPATIENT)
Dept: LAB | Facility: HOSPITAL | Age: 59
End: 2024-01-26
Payer: COMMERCIAL

## 2024-01-26 ENCOUNTER — TELEMEDICINE (OUTPATIENT)
Dept: FAMILY MEDICINE CLINIC | Facility: CLINIC | Age: 59
End: 2024-01-26
Payer: COMMERCIAL

## 2024-01-26 DIAGNOSIS — R10.9 BELLY PAIN: ICD-10-CM

## 2024-01-26 DIAGNOSIS — R10.9 BELLY PAIN: Primary | ICD-10-CM

## 2024-01-26 LAB
ALBUMIN SERPL-MCNC: 4.7 G/DL (ref 3.5–5.2)
ALBUMIN/GLOB SERPL: 2 G/DL
ALP SERPL-CCNC: 61 U/L (ref 39–117)
ALT SERPL W P-5'-P-CCNC: 32 U/L (ref 1–33)
ANION GAP SERPL CALCULATED.3IONS-SCNC: 11 MMOL/L (ref 5–15)
AST SERPL-CCNC: 32 U/L (ref 1–32)
BILIRUB SERPL-MCNC: 0.4 MG/DL (ref 0–1.2)
BUN SERPL-MCNC: 13 MG/DL (ref 6–20)
BUN/CREAT SERPL: 15.5 (ref 7–25)
CALCIUM SPEC-SCNC: 9.2 MG/DL (ref 8.6–10.5)
CHLORIDE SERPL-SCNC: 108 MMOL/L (ref 98–107)
CO2 SERPL-SCNC: 24 MMOL/L (ref 22–29)
CREAT SERPL-MCNC: 0.84 MG/DL (ref 0.57–1)
DEPRECATED RDW RBC AUTO: 43 FL (ref 37–54)
EGFRCR SERPLBLD CKD-EPI 2021: 80.7 ML/MIN/1.73
ERYTHROCYTE [DISTWIDTH] IN BLOOD BY AUTOMATED COUNT: 13.6 % (ref 12.3–15.4)
GLOBULIN UR ELPH-MCNC: 2.3 GM/DL
GLUCOSE SERPL-MCNC: 80 MG/DL (ref 65–99)
HCT VFR BLD AUTO: 40.7 % (ref 34–46.6)
HGB BLD-MCNC: 13.7 G/DL (ref 12–15.9)
LIPASE SERPL-CCNC: 38 U/L (ref 13–60)
MCH RBC QN AUTO: 29.1 PG (ref 26.6–33)
MCHC RBC AUTO-ENTMCNC: 33.7 G/DL (ref 31.5–35.7)
MCV RBC AUTO: 86.6 FL (ref 79–97)
PLATELET # BLD AUTO: 253 10*3/MM3 (ref 140–450)
PMV BLD AUTO: 10 FL (ref 6–12)
POTASSIUM SERPL-SCNC: 4.8 MMOL/L (ref 3.5–5.2)
PROT SERPL-MCNC: 7 G/DL (ref 6–8.5)
RBC # BLD AUTO: 4.7 10*6/MM3 (ref 3.77–5.28)
SODIUM SERPL-SCNC: 143 MMOL/L (ref 136–145)
WBC NRBC COR # BLD AUTO: 8.19 10*3/MM3 (ref 3.4–10.8)

## 2024-01-26 PROCEDURE — 85027 COMPLETE CBC AUTOMATED: CPT

## 2024-01-26 PROCEDURE — 83690 ASSAY OF LIPASE: CPT

## 2024-01-26 PROCEDURE — 36415 COLL VENOUS BLD VENIPUNCTURE: CPT

## 2024-01-26 PROCEDURE — 80053 COMPREHEN METABOLIC PANEL: CPT

## 2024-01-26 PROCEDURE — 99214 OFFICE O/P EST MOD 30 MIN: CPT | Performed by: STUDENT IN AN ORGANIZED HEALTH CARE EDUCATION/TRAINING PROGRAM

## 2024-01-26 RX ORDER — LEVOFLOXACIN 750 MG/1
750 TABLET, FILM COATED ORAL DAILY
Qty: 10 TABLET | Refills: 0 | Status: SHIPPED | OUTPATIENT
Start: 2024-01-26

## 2024-01-26 RX ORDER — METRONIDAZOLE 500 MG/1
500 TABLET ORAL 3 TIMES DAILY
Qty: 30 TABLET | Refills: 0 | Status: SHIPPED | OUTPATIENT
Start: 2024-01-26 | End: 2024-02-05

## 2024-01-26 NOTE — PROGRESS NOTES
Chief Complaint  No chief complaint on file.    History of Present Illness    The following portions of the patient's history were reviewed and updated as appropriate: allergies, current medications, past family history, past medical history, past social history, past surgical history, and problem list.    OBJECTIVE:  There were no vitals taken for this visit.      Physical Exam               Assessment and Plan   There are no diagnoses linked to this encounter.      No follow-ups on file.       Shirin Dave D.O.  [facility] [place]

## 2024-01-26 NOTE — PROGRESS NOTES
You have chosen to receive care through a telehealth visit.  Do you consent to use a video/audio connection for your medical care today? Yes     Chief Complaint  Jessika Sousa is a 58 y.o. female who presents via video-conference for follow up    Patient and provider in [place].     History of Present Illness    Belly pain  Last Saturday she started having pain in her left lower belly and the whole abdomen tensed up.   Last bm was 3 hours ago and was not not diarrhea but are not full bms. She had constipation prior to this.   No Vomiting  No Fever  No Bloody or black stool  She tried to exercise and was able to do half of normal.       The following portions of the patient's history were reviewed and updated as appropriate: allergies, current medications, past family history, past medical history, past social history, past surgical history, and problem list.      Objective  Vital signs available: No  She appears well       Assessment/Plan   Belly pain, history of diverticulosis  Constipation      In llq and severe, concerning for diverticulitis. Will treat with antibiotics.   Check labs and stat ct scan given concern for diverticulitis.   Advised her to go to er for any  worsening pains/fever.   Add in docusate for gentle bms and start soft diet and fluids.   She will follow up with me in one week.       Shirin Dave D.O.  [facility] [place]       15 minutes were spent reviewing the patient's questionnaire, formulating a treatment plan, and relaying information to the patient via [facility].

## 2024-01-29 ENCOUNTER — TELEPHONE (OUTPATIENT)
Dept: FAMILY MEDICINE CLINIC | Facility: CLINIC | Age: 59
End: 2024-01-29
Payer: COMMERCIAL

## 2024-01-29 NOTE — PROGRESS NOTES
Called Jessika Sousa [facility] for pt to call back.    Left encounter for hub to read

## 2024-01-29 NOTE — TELEPHONE ENCOUNTER
Hub can read ----- Message from Shirin Dave DO sent at 1/29/2024  8:24 AM EST -----  Please let her know her labs are stable without any elevation in infection cells.

## 2024-01-29 NOTE — TELEPHONE ENCOUNTER
"Name: Jessika Sousa \"Asmita\"    Relationship: Self    Best Callback Number: [phone]     HUB PROVIDED THE RELAY MESSAGE FROM THE OFFICE   PATIENT VOICED UNDERSTANDING AND HAS NO FURTHER QUESTIONS AT THIS TIME    ADDITIONAL INFORMATION:   "

## 2024-02-02 ENCOUNTER — OFFICE VISIT (OUTPATIENT)
Dept: OBSTETRICS AND GYNECOLOGY | Facility: CLINIC | Age: 59
End: 2024-02-02
Payer: COMMERCIAL

## 2024-02-02 VITALS
SYSTOLIC BLOOD PRESSURE: 142 MMHG | BODY MASS INDEX: 37.39 KG/M2 | HEIGHT: 62 IN | DIASTOLIC BLOOD PRESSURE: 86 MMHG | WEIGHT: 203.2 LBS

## 2024-02-02 DIAGNOSIS — Z01.419 WOMEN'S ANNUAL ROUTINE GYNECOLOGICAL EXAMINATION: Primary | ICD-10-CM

## 2024-02-02 DIAGNOSIS — N39.3 STRESS INCONTINENCE IN FEMALE: ICD-10-CM

## 2024-02-02 DIAGNOSIS — N95.2 VAGINAL ATROPHY: ICD-10-CM

## 2024-02-02 RX ORDER — CLINDAMYCIN PHOSPHATE 10 UG/ML
LOTION TOPICAL
COMMUNITY
Start: 2024-01-09

## 2024-02-02 RX ORDER — DOXYCYCLINE HYCLATE 100 MG/1
CAPSULE ORAL
COMMUNITY
Start: 2024-01-09 | End: 2024-02-02

## 2024-02-02 RX ORDER — PRASTERONE 6.5 MG/1
1 INSERT VAGINAL NIGHTLY
Qty: 28 EACH | Refills: 12 | Status: SHIPPED | OUTPATIENT
Start: 2024-02-02

## 2024-02-02 NOTE — PROGRESS NOTES
Gynecologic Annual Exam Note        GYN Annual Exam     CC - Here for annual exam.        HPI  Jessika Sousa is a 58 y.o. female, , who presents for annual well woman exam as a new patient.  She is postmenopausal.. Denies vaginal bleeding.  Marital Status: .  She is sexually active. She has not had new partners.. STD testing recommendations have been explained to the patient and she declines STD testing.    The patient would like to discuss the following complaints today: facial hair, decreased libido, and stress incontinence with coughing, sneezing, or exercise.    Additional OB/GYN History   On HRT? No    Last Pap : . Results: negative. HPV: unknown . Per pt.  Last Completed Pap Smear       This patient has no relevant Health Maintenance data.          History of abnormal Pap smear: yes - 10 years ago, repeats normal  Family history of uterine, colon, breast, or ovarian cancer: yes - Breast Ca- Mother & MGM  Performs monthly Self-Breast Exam: no  Last mammogram: 10/12/23. Done at .    Last Completed Mammogram            MAMMOGRAM (Every 2 Years) Next due on 10/12/2025      10/12/2023  Mammo Screening Digital Tomosynthesis Bilateral With CAD    2022  Mammo Screening Digital Tomosynthesis Bilateral With CAD    2020  SCANNED - MAMMO    2020  MAMMO SCREENING DIGITAL TOMOSYNTHESIS BILATERAL W CAD    01/10/2019  MAMMO SCREENING DIGITAL TOMOSYNTHESIS BILATERAL W CAD    Only the first 5 history entries have been loaded, but more history exists.                  Last colonoscopy: has had a colonoscopy 7 months ago    Last Completed Colonoscopy            COLORECTAL CANCER SCREENING (COLONOSCOPY - Every 10 Years) Next due on 2023  SCANNED - COLONOSCOPY    2020  SCANNED - COLONOSCOPY                    Her last bone density scan was 23 ago and results were Normal  Exercises Regularly: yes  Feelings of Anxiety or Depression: no      Tobacco  Usage?: No       Current Outpatient Medications:     atorvastatin (LIPITOR) 20 MG tablet, TAKE 1 TABLET BY MOUTH DAILY, Disp: 90 tablet, Rfl: 0    cholestyramine (QUESTRAN) 4 g packet, MIX AND DRINK 1 PACKET BY MOUTH DAILY AS NEEDED FOR DIARRHEA, Disp: 90 each, Rfl: 0    citalopram (CeleXA) 20 MG tablet, TAKE 1 TABLET BY MOUTH DAILY, Disp: 90 tablet, Rfl: 1    clindamycin (CLEOCIN T) 1 % lotion, APPLY A THIN LAYER TO THE AFFECTED AREA(S) BY TOPICAL ROUTE once a day, Disp: , Rfl:     fexofenadine-pseudoephedrine (Allegra-D Allergy & Congestion) 180-240 MG per 24 hr tablet, Take 1 tablet by mouth Daily., Disp: 30 tablet, Rfl: 1    ipratropium (ATROVENT) 0.03 % nasal spray, 2 sprays into the nostril(s) as directed by provider Every 12 (Twelve) Hours., Disp: 1 each, Rfl: 3    levoFLOXacin (Levaquin) 750 MG tablet, Take 1 tablet by mouth Daily., Disp: 10 tablet, Rfl: 0    metroNIDAZOLE (FLAGYL) 500 MG tablet, Take 1 tablet by mouth 3 (Three) Times a Day for 10 days., Disp: 30 tablet, Rfl: 0    pantoprazole (PROTONIX) 40 MG EC tablet, TAKE 1 TABLET BY MOUTH DAILY, Disp: 90 tablet, Rfl: 0    vitamin B-12 (CYANOCOBALAMIN) 1000 MCG tablet, Take 1 tablet by mouth Daily., Disp: 90 tablet, Rfl: 3    Prasterone (Intrarosa) 6.5 MG insert, Insert 1 suppository into the vagina Every Night. BIN#[id number]; PCN#[id number]; [facility]#[id number]; ID#[id number], Disp: 28 each, Rfl: 12    Current Facility-Administered Medications:     cyanocobalamin injection 1,000 mcg, 1,000 mcg, Intramuscular, Q28 Days, Kajal Fairbanks DO, 1,000 mcg at 09/10/20 1041    cyanocobalamin injection 1,000 mcg, 1,000 mcg, Intramuscular, Q28 Days, Kajal Fairbanks DO, 1,000 mcg at 01/10/20 1242    cyanocobalamin injection 1,000 mcg, 1,000 mcg, Intramuscular, Q28 Days, Kajal Fairbanks DO, 1,000 mcg at 04/21/21 1152    cyanocobalamin injection 1,000 mcg, 1,000 mcg, Intramuscular, Q28 Days, Kajal Fairbanks DO, 1,000 mcg at 11/01/21 1120    Patient is requesting refills of  Intrarosa.    OB History          2    Para   2    Term   2            AB        Living   2         SAB        IAB        Ectopic        Molar        Multiple        Live Births   2                Past Medical History:   Diagnosis Date    Fibroid     Approximately    Hyperlipidemia     Taking medicine for it    Infectious viral hepatitis ?    Food related one    Irritable bowel syndrome     Varicella Not sure        Past Surgical History:   Procedure Laterality Date    BREAST BIOPSY N/A     side unknown    COLONOSCOPY      ENDOMETRIAL ABLATION          FOOT SURGERY      Bilateral Foot Spur Remove     LAPAROSCOPIC CHOLECYSTECTOMY  2023    UPPER GASTROINTESTINAL ENDOSCOPY      WISDOM TOOTH EXTRACTION         Health Maintenance   Topic Date Due    Annual Gynecologic Pelvic and Breast Exam  Never done    ZOSTER VACCINE (1 of 2) Never done    PAP SMEAR  Never done    ANNUAL PHYSICAL  2023    COVID-19 Vaccine ( - - season) 2023    BMI FOLLOWUP  2023    TDAP/TD VACCINES (2 - Td or Tdap) 03/15/2024 (Originally 2018)    LIPID PANEL  10/09/2024    DXA SCAN  2025    MAMMOGRAM  10/12/2025    COLORECTAL CANCER SCREENING  2033    HEPATITIS C SCREENING  Completed    INFLUENZA VACCINE  Completed    Pneumococcal Vaccine 0-64  Aged Out       The additional following portions of the patient's history were reviewed and updated as appropriate: allergies, current medications, past family history, past medical history, past social history, past surgical history, and problem list.    Review of Systems   Constitutional: Negative.    HENT: Negative.     Eyes: Negative.    Respiratory: Negative.     Cardiovascular: Negative.    Gastrointestinal: Negative.    Endocrine: Negative.    Genitourinary:  Positive for decreased libido. Bladder incontinence: stress incontinence.  Musculoskeletal: Negative.    Skin:         Facial hair   Allergic/Immunologic: Negative.   "  Neurological: Negative.    Hematological: Negative.    Psychiatric/Behavioral: Negative.         I have reviewed and agree with the HPI, ROS, and historical information as entered above. Leah Pedroza, APRN      Objective   /86   Ht 157.5 cm (62\")   Wt 92.2 kg (203 lb 3.2 oz)   BMI 37.17 kg/m²     Physical Exam  Vitals and nursing note reviewed. Exam conducted with a chaperone present.   Constitutional:       General: She is not in acute distress.     Appearance: Normal appearance. She is well-developed. She is not ill-appearing.   Neck:      Thyroid: No thyroid mass or thyromegaly.   Pulmonary:      Effort: Pulmonary effort is normal. No respiratory distress or retractions.   Chest:      Chest wall: No mass.   Breasts:     Right: Normal. No mass, nipple discharge, skin change or tenderness.      Left: Normal. No mass, nipple discharge, skin change or tenderness.   Abdominal:      General: There is no distension.      Palpations: Abdomen is soft. Abdomen is not rigid. There is no mass.      Tenderness: There is no abdominal tenderness. There is no guarding or rebound.      Hernia: No hernia is present.   Genitourinary:     General: Normal vulva.      Exam position: Lithotomy position.      Labia:         Right: No rash, tenderness or lesion.         Left: No rash, tenderness or lesion.       Vagina: Normal. No vaginal discharge or lesions.      Cervix: Normal. No cervical motion tenderness, discharge, friability, erythema or cervical bleeding.      Uterus: Normal. Not enlarged, not fixed and not tender.       Adnexa: Right adnexa normal and left adnexa normal.        Right: No mass or tenderness.          Left: No mass or tenderness.        Rectum: Normal. No external hemorrhoid.   Musculoskeletal:      Cervical back: No muscular tenderness.   Skin:     General: Skin is warm and dry.   Neurological:      Mental Status: She is alert and oriented to person, place, and time.   Psychiatric:         Mood and " Affect: Mood normal.         Behavior: Behavior normal.            Assessment and Plan    Problem List Items Addressed This Visit    None  Visit Diagnoses       Women's annual routine gynecological examination    -  Primary    Relevant Orders    LIQUID-BASED PAP SMEAR WITH HPV GENOTYPING REGARDLESS OF INTERPRETATION ([facility])    Stress incontinence in female        Relevant Orders    Ambulatory Referral to Physical Therapy Pelvic Floor    Vaginal atrophy        Relevant Medications    Prasterone (Intrarosa) 6.5 MG insert            GYN annual well woman exam. Pap obtained today.   Reviewed monthly self breast exams.  Instructed to call with lumps, pain, or breast discharge.  Yearly mammograms ordered.  Recommended use of Vitamin D and getting adequate calcium in her diet. (1500mg)  Reviewed exercise as a preventative health measures.   Reviewed BMI and weight loss as preventative health measures.   Reviewed [facility] ovarian cancer screening program.  Symptoms of menopausal transition reviewed with patient.  Reviewed risks/benefits of OTC, non-hormonal and hormonal treatment for vasomotor and other menopausal symptoms.  Instructed on Kegal exercises and gave patient educational information.  Referral made for Pelvic Floor Physical Therapy  Discussed lower libido and gave resources including the [employer] michele.   She had been taking Intrarosa for vaginal atrophy before, and it helped but she ran out. Rx sent today and samples given.   Return in about 1 year (around 2/2/2025) for Annual physical.         PREETI Holden  02/02/2024

## 2024-02-07 DIAGNOSIS — F33.0 MILD EPISODE OF RECURRENT DEPRESSIVE DISORDER: Chronic | ICD-10-CM

## 2024-02-07 RX ORDER — CITALOPRAM 20 MG/1
20 TABLET ORAL DAILY
Qty: 90 TABLET | Refills: 1 | Status: SHIPPED | OUTPATIENT
Start: 2024-02-07

## 2024-02-08 LAB — REF LAB TEST METHOD: NORMAL

## 2024-03-08 RX ORDER — PANTOPRAZOLE SODIUM 40 MG/1
40 TABLET, DELAYED RELEASE ORAL DAILY
Qty: 90 TABLET | Refills: 0 | Status: SHIPPED | OUTPATIENT
Start: 2024-03-08

## 2024-03-08 RX ORDER — ATORVASTATIN CALCIUM 20 MG/1
20 TABLET, FILM COATED ORAL DAILY
Qty: 90 TABLET | Refills: 0 | Status: SHIPPED | OUTPATIENT
Start: 2024-03-08

## 2024-04-09 ENCOUNTER — OFFICE VISIT (OUTPATIENT)
Dept: FAMILY MEDICINE CLINIC | Facility: CLINIC | Age: 59
End: 2024-04-09
Payer: COMMERCIAL

## 2024-04-09 ENCOUNTER — LAB (OUTPATIENT)
Dept: LAB | Facility: HOSPITAL | Age: 59
End: 2024-04-09
Payer: COMMERCIAL

## 2024-04-09 VITALS
HEART RATE: 86 BPM | WEIGHT: 202.8 LBS | RESPIRATION RATE: 18 BRPM | SYSTOLIC BLOOD PRESSURE: 142 MMHG | OXYGEN SATURATION: 97 % | BODY MASS INDEX: 37.32 KG/M2 | DIASTOLIC BLOOD PRESSURE: 90 MMHG | HEIGHT: 62 IN | TEMPERATURE: 97.8 F

## 2024-04-09 DIAGNOSIS — E78.5 HYPERLIPIDEMIA, UNSPECIFIED HYPERLIPIDEMIA TYPE: ICD-10-CM

## 2024-04-09 DIAGNOSIS — R73.03 PREDIABETES: ICD-10-CM

## 2024-04-09 DIAGNOSIS — R21 RASH: ICD-10-CM

## 2024-04-09 DIAGNOSIS — L57.0 KERATOSIS: ICD-10-CM

## 2024-04-09 DIAGNOSIS — R73.03 PREDIABETES: Primary | ICD-10-CM

## 2024-04-09 LAB
CHOLEST SERPL-MCNC: 190 MG/DL (ref 0–200)
DEPRECATED RDW RBC AUTO: 43.5 FL (ref 37–54)
ERYTHROCYTE [DISTWIDTH] IN BLOOD BY AUTOMATED COUNT: 13.4 % (ref 12.3–15.4)
HBA1C MFR BLD: 5.8 % (ref 4.8–5.6)
HCT VFR BLD AUTO: 47.3 % (ref 34–46.6)
HDLC SERPL-MCNC: 58 MG/DL (ref 40–60)
HGB BLD-MCNC: 15.2 G/DL (ref 12–15.9)
INR PPP: 1.2 (ref 0.89–1.12)
LDLC SERPL CALC-MCNC: 107 MG/DL (ref 0–100)
LDLC/HDLC SERPL: 1.78 {RATIO}
MCH RBC QN AUTO: 28.3 PG (ref 26.6–33)
MCHC RBC AUTO-ENTMCNC: 32.1 G/DL (ref 31.5–35.7)
MCV RBC AUTO: 87.9 FL (ref 79–97)
PLATELET # BLD AUTO: 305 10*3/MM3 (ref 140–450)
PMV BLD AUTO: 9.8 FL (ref 6–12)
PROTHROMBIN TIME: 15.4 SECONDS (ref 12.2–14.5)
RBC # BLD AUTO: 5.38 10*6/MM3 (ref 3.77–5.28)
TRIGL SERPL-MCNC: 143 MG/DL (ref 0–150)
VLDLC SERPL-MCNC: 25 MG/DL (ref 5–40)
WBC NRBC COR # BLD AUTO: 6.84 10*3/MM3 (ref 3.4–10.8)

## 2024-04-09 PROCEDURE — 85610 PROTHROMBIN TIME: CPT

## 2024-04-09 PROCEDURE — 36415 COLL VENOUS BLD VENIPUNCTURE: CPT

## 2024-04-09 PROCEDURE — 17110 DESTRUCTION B9 LES UP TO 14: CPT | Performed by: STUDENT IN AN ORGANIZED HEALTH CARE EDUCATION/TRAINING PROGRAM

## 2024-04-09 PROCEDURE — 85027 COMPLETE CBC AUTOMATED: CPT

## 2024-04-09 PROCEDURE — 99214 OFFICE O/P EST MOD 30 MIN: CPT | Performed by: STUDENT IN AN ORGANIZED HEALTH CARE EDUCATION/TRAINING PROGRAM

## 2024-04-09 PROCEDURE — 80061 LIPID PANEL: CPT

## 2024-04-09 PROCEDURE — 83036 HEMOGLOBIN GLYCOSYLATED A1C: CPT

## 2024-04-09 NOTE — PROGRESS NOTES
"Chief Complaint  Abdominal Pain (Abdominal pain)    Abdominal Pain        She has been feeling really good. The pain in her belly totally left after she had her gallbladder removed. She can now eat and not have to immediately go to the bathroom.       Last two A1c normal but given predm in the past we can recheck this.       She will wake up and have tingling in her entire hand.     The following portions of the patient's history were reviewed and updated as appropriate: allergies, current medications, past family history, past medical history, past social history, past surgical history, and problem list.    OBJECTIVE:  /90 (BP Location: Right arm, Patient Position: Sitting, Cuff Size: Adult)   Pulse 86   Temp 97.8 °F (36.6 °C) (Temporal)   Resp 18   Ht 157.5 cm (62\")   Wt 92 kg (202 lb 12.8 oz)   SpO2 97%   BMI 37.09 kg/m²       Physical Exam  Constitutional:       General: She is not in acute distress.     Appearance: Normal appearance.   HENT:      Head: Normocephalic and atraumatic.   Eyes:      Extraocular Movements: Extraocular movements intact.   Cardiovascular:      Rate and Rhythm: Normal rate and regular rhythm.      Heart sounds: No murmur heard.  Pulmonary:      Effort: Pulmonary effort is normal. No respiratory distress.      Breath sounds: Normal breath sounds. No stridor. No wheezing, rhonchi or rales.   Skin:     Findings: No rash (possible purpura vs bruise right ankle. keratosis left sholder.).   Neurological:      General: No focal deficit present.      Mental Status: She is alert.   Psychiatric:         Mood and Affect: Mood normal.                  Assessment and Plan   Diagnoses and all orders for this visit:    1. Prediabetes (Primary)  -     Hemoglobin A1c; Future    2. Hyperlipidemia, unspecified hyperlipidemia type  -     Lipid panel; Future    3. Rash  -     CBC (No Diff); Future  -     Protime-INR; Future  -     Urinalysis With Microscopic - Urine, Clean Catch; " Future      Rash  She has what appears to be possible purpura or light bruising on her right anterior ankle.   Will check labs above and have her start multivitamin. Consider biopsy if no improvement.     Skin lesion  Keratosis left shoulder lateral.  Treated with cryotherapy. Patient tolerated without complication.         Hand tingling, chronic  In ring finger and in entire hand. She will keep journal to note which fingers exactly to distinguish carpal from ulnar issue.   We discussed possible nerve study. She would like to try a wrist brace with nsaid first and we will consider. Consider neck imaging as well.     Return in about 6 months (around 10/9/2024) for Annual.       Shirin Dave D.O.  [facility] [place] [facility] [place]

## 2024-04-11 DIAGNOSIS — R79.1 ABNORMAL INR: Primary | ICD-10-CM

## 2024-04-11 NOTE — PROGRESS NOTES
Please let the patient know that the diabetic test (a1c) is elevated into the prediabetic not diabetic range. Recommend diet with lean meat fish and vegetables and decreased sugar/carbs with daily exercise. Can refer to phone dietitian if this helps let me know. Recommend to recheck this in 3-6 months.       Cholesterol is 7 points high, but still improved from 2022.     Her bleeding time (called INR) is mildly high. There is a long list of causes of this. I checked it originally given what appeared to be abnormal bruising lesions on the ankle. I want to make sure this is not just a false lab test, so is she agreeable to stop by lab for recheck on this and to check another bleeding test called pTT? If so I have already ordered it for her.

## 2024-04-13 ENCOUNTER — LAB (OUTPATIENT)
Dept: LAB | Facility: HOSPITAL | Age: 59
End: 2024-04-13
Payer: COMMERCIAL

## 2024-04-13 DIAGNOSIS — R21 RASH: ICD-10-CM

## 2024-04-13 DIAGNOSIS — R79.1 ABNORMAL INR: ICD-10-CM

## 2024-04-13 LAB
APTT PPP: 26.1 SECONDS (ref 23.5–35.5)
BACTERIA UR QL AUTO: NORMAL /HPF
BILIRUB UR QL STRIP: NEGATIVE
CLARITY UR: CLEAR
COLOR UR: YELLOW
GLUCOSE UR STRIP-MCNC: NEGATIVE MG/DL
HGB UR QL STRIP.AUTO: NEGATIVE
HYALINE CASTS UR QL AUTO: NORMAL /LPF
INR PPP: 1.11 (ref 0.9–1.1)
KETONES UR QL STRIP: NEGATIVE
LEUKOCYTE ESTERASE UR QL STRIP.AUTO: ABNORMAL
NITRITE UR QL STRIP: NEGATIVE
PH UR STRIP.AUTO: 6.5 [PH] (ref 5–8)
PROT UR QL STRIP: NEGATIVE
PROTHROMBIN TIME: 14.8 SECONDS (ref 12.3–15.1)
RBC # UR STRIP: NORMAL /HPF
REF LAB TEST METHOD: NORMAL
SP GR UR STRIP: 1.01 (ref 1–1.03)
SQUAMOUS #/AREA URNS HPF: NORMAL /HPF
UROBILINOGEN UR QL STRIP: ABNORMAL
WBC # UR STRIP: NORMAL /HPF

## 2024-04-13 PROCEDURE — 81001 URINALYSIS AUTO W/SCOPE: CPT

## 2024-04-13 PROCEDURE — 85610 PROTHROMBIN TIME: CPT

## 2024-04-13 PROCEDURE — 85730 THROMBOPLASTIN TIME PARTIAL: CPT

## 2024-04-15 DIAGNOSIS — R79.1 ABNORMAL INR: Primary | ICD-10-CM

## 2024-04-15 NOTE — PROGRESS NOTES
Please call the patient to let her know her level is trending back to normal.   She has trace white cells in urine. For any signs of uti let me know.   We can monitor this level in another 2-4 weeks at lab as I think it will most likely go back down to normal and was transient.  I will place the order.

## 2024-05-25 ENCOUNTER — LAB (OUTPATIENT)
Dept: LAB | Facility: HOSPITAL | Age: 59
End: 2024-05-25
Payer: COMMERCIAL

## 2024-05-25 DIAGNOSIS — R79.1 ABNORMAL INR: ICD-10-CM

## 2024-05-25 LAB
INR PPP: 1.15 (ref 0.9–1.1)
PROTHROMBIN TIME: 15.3 SECONDS (ref 12.3–15.1)

## 2024-05-25 PROCEDURE — 85610 PROTHROMBIN TIME: CPT

## 2024-05-25 PROCEDURE — 36415 COLL VENOUS BLD VENIPUNCTURE: CPT

## 2024-06-06 RX ORDER — PANTOPRAZOLE SODIUM 40 MG/1
40 TABLET, DELAYED RELEASE ORAL DAILY
Qty: 90 TABLET | Refills: 0 | Status: SHIPPED | OUTPATIENT
Start: 2024-06-06

## 2024-06-06 RX ORDER — ATORVASTATIN CALCIUM 20 MG/1
20 TABLET, FILM COATED ORAL DAILY
Qty: 90 TABLET | Refills: 0 | Status: SHIPPED | OUTPATIENT
Start: 2024-06-06

## 2024-07-22 RX ORDER — PANTOPRAZOLE SODIUM 40 MG/1
40 TABLET, DELAYED RELEASE ORAL DAILY
Qty: 90 TABLET | Refills: 0 | Status: SHIPPED | OUTPATIENT
Start: 2024-07-22

## 2024-08-19 ENCOUNTER — TRANSCRIBE ORDERS (OUTPATIENT)
Dept: ADMINISTRATIVE | Facility: HOSPITAL | Age: 59
End: 2024-08-19
Payer: COMMERCIAL

## 2024-08-19 DIAGNOSIS — Z12.31 SCREENING MAMMOGRAM FOR BREAST CANCER: Primary | ICD-10-CM

## 2024-09-16 DIAGNOSIS — F33.0 MILD EPISODE OF RECURRENT DEPRESSIVE DISORDER: Chronic | ICD-10-CM

## 2024-09-16 RX ORDER — CITALOPRAM HYDROBROMIDE 20 MG/1
20 TABLET ORAL DAILY
Qty: 90 TABLET | Refills: 0 | Status: SHIPPED | OUTPATIENT
Start: 2024-09-16

## 2024-09-16 RX ORDER — ATORVASTATIN CALCIUM 20 MG/1
20 TABLET, FILM COATED ORAL DAILY
Qty: 90 TABLET | Refills: 0 | Status: SHIPPED | OUTPATIENT
Start: 2024-09-16

## 2024-10-11 ENCOUNTER — OFFICE VISIT (OUTPATIENT)
Dept: FAMILY MEDICINE CLINIC | Facility: CLINIC | Age: 59
End: 2024-10-11
Payer: COMMERCIAL

## 2024-10-11 ENCOUNTER — LAB (OUTPATIENT)
Dept: LAB | Facility: HOSPITAL | Age: 59
End: 2024-10-11
Payer: COMMERCIAL

## 2024-10-11 VITALS
WEIGHT: 202.4 LBS | HEART RATE: 97 BPM | TEMPERATURE: 98.9 F | HEIGHT: 62 IN | DIASTOLIC BLOOD PRESSURE: 66 MMHG | BODY MASS INDEX: 37.25 KG/M2 | OXYGEN SATURATION: 96 % | RESPIRATION RATE: 20 BRPM | SYSTOLIC BLOOD PRESSURE: 124 MMHG

## 2024-10-11 DIAGNOSIS — Z00.00 ANNUAL PHYSICAL EXAM: Primary | ICD-10-CM

## 2024-10-11 DIAGNOSIS — Z00.00 ANNUAL PHYSICAL EXAM: ICD-10-CM

## 2024-10-11 DIAGNOSIS — M79.644 PAIN OF RIGHT THUMB: ICD-10-CM

## 2024-10-11 DIAGNOSIS — R21 SKIN RASH: ICD-10-CM

## 2024-10-11 DIAGNOSIS — K64.4 ANAL SKIN TAG: ICD-10-CM

## 2024-10-11 LAB
ALBUMIN SERPL-MCNC: 4.7 G/DL (ref 3.5–5.2)
ALBUMIN/GLOB SERPL: 1.6 G/DL
ALP SERPL-CCNC: 70 U/L (ref 39–117)
ALT SERPL W P-5'-P-CCNC: 38 U/L (ref 1–33)
ANION GAP SERPL CALCULATED.3IONS-SCNC: 13 MMOL/L (ref 5–15)
AST SERPL-CCNC: 30 U/L (ref 1–32)
BILIRUB SERPL-MCNC: 0.4 MG/DL (ref 0–1.2)
BUN SERPL-MCNC: 14 MG/DL (ref 6–20)
BUN/CREAT SERPL: 16.3 (ref 7–25)
CALCIUM SPEC-SCNC: 9.9 MG/DL (ref 8.6–10.5)
CHLORIDE SERPL-SCNC: 104 MMOL/L (ref 98–107)
CHOLEST SERPL-MCNC: 185 MG/DL (ref 0–200)
CO2 SERPL-SCNC: 22 MMOL/L (ref 22–29)
CREAT SERPL-MCNC: 0.86 MG/DL (ref 0.57–1)
DEPRECATED RDW RBC AUTO: 43 FL (ref 37–54)
EGFRCR SERPLBLD CKD-EPI 2021: 78.4 ML/MIN/1.73
ERYTHROCYTE [DISTWIDTH] IN BLOOD BY AUTOMATED COUNT: 13.5 % (ref 12.3–15.4)
GLOBULIN UR ELPH-MCNC: 2.9 GM/DL
GLUCOSE SERPL-MCNC: 88 MG/DL (ref 65–99)
HBA1C MFR BLD: 5.6 % (ref 4.8–5.6)
HCT VFR BLD AUTO: 46.2 % (ref 34–46.6)
HDLC SERPL-MCNC: 56 MG/DL (ref 40–60)
HGB BLD-MCNC: 15.8 G/DL (ref 12–15.9)
LDLC SERPL CALC-MCNC: 114 MG/DL (ref 0–100)
LDLC/HDLC SERPL: 2.01 {RATIO}
MCH RBC QN AUTO: 30.3 PG (ref 26.6–33)
MCHC RBC AUTO-ENTMCNC: 34.2 G/DL (ref 31.5–35.7)
MCV RBC AUTO: 88.5 FL (ref 79–97)
PLATELET # BLD AUTO: 307 10*3/MM3 (ref 140–450)
PMV BLD AUTO: 10.4 FL (ref 6–12)
POTASSIUM SERPL-SCNC: 4.2 MMOL/L (ref 3.5–5.2)
PROT SERPL-MCNC: 7.6 G/DL (ref 6–8.5)
RBC # BLD AUTO: 5.22 10*6/MM3 (ref 3.77–5.28)
SODIUM SERPL-SCNC: 139 MMOL/L (ref 136–145)
TRIGL SERPL-MCNC: 83 MG/DL (ref 0–150)
TSH SERPL DL<=0.05 MIU/L-ACNC: 1.59 UIU/ML (ref 0.27–4.2)
VLDLC SERPL-MCNC: 15 MG/DL (ref 5–40)
WBC NRBC COR # BLD AUTO: 8.56 10*3/MM3 (ref 3.4–10.8)

## 2024-10-11 PROCEDURE — 82607 VITAMIN B-12: CPT

## 2024-10-11 PROCEDURE — 80050 GENERAL HEALTH PANEL: CPT

## 2024-10-11 PROCEDURE — 83036 HEMOGLOBIN GLYCOSYLATED A1C: CPT

## 2024-10-11 PROCEDURE — 82306 VITAMIN D 25 HYDROXY: CPT

## 2024-10-11 PROCEDURE — 36415 COLL VENOUS BLD VENIPUNCTURE: CPT

## 2024-10-11 PROCEDURE — 80061 LIPID PANEL: CPT

## 2024-10-11 PROCEDURE — 87340 HEPATITIS B SURFACE AG IA: CPT

## 2024-10-11 PROCEDURE — 86704 HEP B CORE ANTIBODY TOTAL: CPT

## 2024-10-11 PROCEDURE — 86706 HEP B SURFACE ANTIBODY: CPT

## 2024-10-11 RX ORDER — CLOTRIMAZOLE 1 G/ML
SOLUTION TOPICAL 2 TIMES DAILY
Qty: 1 ML | Refills: 0 | Status: SHIPPED | OUTPATIENT
Start: 2024-10-11

## 2024-10-11 NOTE — PROGRESS NOTES
"Chief Complaint  Annual Exam    History of Present Illness            Annual exam  Colonoscopy up to date   Mammogram coming  Pap smear up to date   dexa scan up to date   Counseled on diet and exercise  hiv hep c sti screening: she declines  Vaccines recommended:  covid vaccine  flu  Tdap  hepatitis      Gerd  She has been taking ppi regularly and stomach feels great .         She has been havinga  few days of pain in the thumb. It gets tingling at times.           The following portions of the patient's history were reviewed and updated as appropriate: allergies, current medications, past family history, past medical history, past social history, past surgical history, and problem list.    OBJECTIVE:  /66   Pulse 97   Temp 98.9 °F (37.2 °C) (Temporal)   Resp 20   Ht 157.5 cm (62.01\")   Wt 91.8 kg (202 lb 6.4 oz)   SpO2 96%   BMI 37.01 kg/m²       Physical Exam  Constitutional:       General: She is not in acute distress.     Appearance: Normal appearance.   HENT:      Head: Normocephalic and atraumatic.   Eyes:      Extraocular Movements: Extraocular movements intact.   Cardiovascular:      Rate and Rhythm: Normal rate and regular rhythm.      Heart sounds: No murmur heard.  Pulmonary:      Effort: Pulmonary effort is normal. No respiratory distress.      Breath sounds: Normal breath sounds. No stridor. No wheezing, rhonchi or rales.   Musculoskeletal:      Comments: She has pain on palpation of thenar eminence . Normal sensations and movement of the thumb. The tingling she points to fingers 2-4    Skin:     Findings: No rash.      Comments: Rectal exam done with arielle nj in the room. Small skin tag present perirectal   Neurological:      General: No focal deficit present.      Mental Status: She is alert.   Psychiatric:         Mood and Affect: Mood normal.                  Assessment and Plan   Diagnoses and all orders for this visit:    1. Annual physical exam (Primary)  -     CBC (No Diff); " Future  -     Comprehensive Metabolic Panel; Future  -     Hemoglobin A1c; Future  -     Lipid Panel; Future  -     TSH Rfx On Abnormal To Free T4; Future  -     Vitamin B12; Future  -     Vitamin D,25-Hydroxy; Future  -     Hepatitis B Virus (HBV) Screening and Diagnosis; Future    2. Pain of right thumb    3. Skin rash  -     TISSUE EXAM, [facility] ([facility]); Future    Other orders  -     Fluzone >6mos (1760-3203)  -     clotrimazole (LOTRIMIN) 1 % external solution; Apply  topically to the appropriate area as directed 2 (Two) Times a Day.  Dispense: 1 mL; Refill: 0      Annual exam  Colonoscopy up to date   Mammogram coming  Pap smear up to date   dexa scan up to date   Counseled on diet and exercise  hiv hep c sti screening: she declines  Vaccines recommended:  covid vaccine  flu  Tdap  Hepatitis        Thumb pain  Recommend bracing, rest, ice, if no improvement pt or ortho  Suspect tendonitis vs carpal      Has possible tinea on right ankle call in lotrisone        She has some pigmented light round lesions little smaller than a quarter on the ankles that does not appear fungal and have been present over 6 mo. Biopsy done today.   Consent was signed and patient understood risks including bleeding, infection,further procedures and will call us or go to er if any occur. Patient name confirmed. The area was cleaned in the normal sterile fashion with alcohol and iodine. 0.5 ml of lidocaine was used to numb the area.  Patient tolerated the procedure without complication.           No follow-ups on file.       Shirin Dave D.O.  [facility]

## 2024-10-12 LAB
25(OH)D3 SERPL-MCNC: 27.8 NG/ML (ref 30–100)
HBV CORE AB SERPL QL IA: NEGATIVE
HBV SURFACE AB SER QL: REACTIVE
HBV SURFACE AG SERPL QL IA: NEGATIVE
IMP & REVIEW OF LAB RESULTS: NORMAL
LABORATORY COMMENT REPORT: NORMAL
VIT B12 BLD-MCNC: 541 PG/ML (ref 211–946)

## 2024-10-12 NOTE — PROGRESS NOTES
Please let Ms. Tian know:  Vitamin D is low. Make sure to take 800-1000 units daily over the counter. If already getting this amount can double it.   Cholesterol is mildly high. We can increase dose on statin. If interested let me know. I think high cholesterol over time has caused a mild elevation in her liver test. We will continue to monitor it. Avoid alcohol and fatty foods to improve this.   She is immune to hep b.

## 2024-10-14 ENCOUNTER — HOSPITAL ENCOUNTER (OUTPATIENT)
Dept: MAMMOGRAPHY | Facility: HOSPITAL | Age: 59
Discharge: HOME | End: 2024-10-14
Admitting: STUDENT IN AN ORGANIZED HEALTH CARE EDUCATION/TRAINING PROGRAM
Payer: COMMERCIAL

## 2024-10-14 DIAGNOSIS — Z12.31 SCREENING MAMMOGRAM FOR BREAST CANCER: ICD-10-CM

## 2024-10-14 PROCEDURE — 77063 BREAST TOMOSYNTHESIS BI: CPT

## 2024-10-14 PROCEDURE — 77067 SCR MAMMO BI INCL CAD: CPT

## 2024-10-15 LAB — REF LAB TEST METHOD: NORMAL

## 2024-10-15 RX ORDER — MINOCYCLINE HYDROCHLORIDE 100 MG/1
100 CAPSULE ORAL 2 TIMES DAILY
Qty: 14 CAPSULE | Refills: 0 | Status: SHIPPED | OUTPATIENT
Start: 2024-10-15

## 2024-10-16 RX ORDER — PANTOPRAZOLE SODIUM 40 MG/1
40 TABLET, DELAYED RELEASE ORAL DAILY
Qty: 90 TABLET | Refills: 0 | Status: SHIPPED | OUTPATIENT
Start: 2024-10-16

## 2024-10-17 RX ORDER — ATORVASTATIN CALCIUM 40 MG/1
40 TABLET, FILM COATED ORAL DAILY
Qty: 90 TABLET | Refills: 0 | Status: SHIPPED | OUTPATIENT
Start: 2024-10-17

## 2024-10-21 ENCOUNTER — TELEPHONE (OUTPATIENT)
Dept: FAMILY MEDICINE CLINIC | Facility: CLINIC | Age: 59
End: 2024-10-21
Payer: COMMERCIAL

## 2024-10-21 NOTE — TELEPHONE ENCOUNTER
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 It is a benign condition and responds usually to minocycline. I called this over for her. Avoid the sun on this and take with food. It can cause nausea. Less likely this could be a type of eczema . Please come in 1-2 weeks after treatment if not resolved. She should also give her dermatologist a copy of this report.     Called patient with the above message and she voiced understanding.

## 2024-10-22 LAB
NCCN CRITERIA FLAG: NORMAL
TYRER CUZICK SCORE: 19.1

## 2024-10-24 ENCOUNTER — HOSPITAL ENCOUNTER (OUTPATIENT)
Facility: HOSPITAL | Age: 59
Discharge: HOME | End: 2024-10-24
Payer: COMMERCIAL

## 2024-10-24 ENCOUNTER — TRANSCRIBE ORDERS (OUTPATIENT)
Dept: ADMINISTRATIVE | Facility: HOSPITAL | Age: 59
End: 2024-10-24
Payer: COMMERCIAL

## 2024-10-24 DIAGNOSIS — R92.8 ABNORMAL MAMMOGRAM: ICD-10-CM

## 2024-10-24 DIAGNOSIS — R92.8 ABNORMAL MAMMOGRAM: Primary | ICD-10-CM

## 2024-10-24 PROCEDURE — 76642 ULTRASOUND BREAST LIMITED: CPT

## 2024-10-24 PROCEDURE — 77066 DX MAMMO INCL CAD BI: CPT

## 2024-10-24 PROCEDURE — G0279 TOMOSYNTHESIS, MAMMO: HCPCS

## 2024-10-25 ENCOUNTER — HOSPITAL ENCOUNTER (OUTPATIENT)
Dept: MAMMOGRAPHY | Facility: HOSPITAL | Age: 59
Discharge: HOME | End: 2024-10-25
Payer: COMMERCIAL

## 2024-10-25 DIAGNOSIS — R92.8 ABNORMAL MAMMOGRAM: ICD-10-CM

## 2024-10-25 PROCEDURE — C1819 TISSUE LOCALIZATION-EXCISION: HCPCS

## 2024-10-25 PROCEDURE — 25010000002 LIDOCAINE 1% - EPINEPHRINE 1:100000 1 %-1:100000 SOLUTION: Performed by: RADIOLOGY

## 2024-10-25 PROCEDURE — 25010000002 LIDOCAINE 1 % SOLUTION: Performed by: RADIOLOGY

## 2024-10-25 RX ORDER — LIDOCAINE HYDROCHLORIDE AND EPINEPHRINE 10; 10 MG/ML; UG/ML
10 INJECTION, SOLUTION INFILTRATION; PERINEURAL ONCE
Status: COMPLETED | OUTPATIENT
Start: 2024-10-25 | End: 2024-10-25

## 2024-10-25 RX ORDER — LIDOCAINE HYDROCHLORIDE 10 MG/ML
5 INJECTION, SOLUTION INFILTRATION; PERINEURAL ONCE
Status: COMPLETED | OUTPATIENT
Start: 2024-10-25 | End: 2024-10-25

## 2024-10-25 RX ADMIN — Medication 3 ML: at 13:24

## 2024-10-25 RX ADMIN — LIDOCAINE HYDROCHLORIDE,EPINEPHRINE BITARTRATE 6 ML: 10; .01 INJECTION, SOLUTION INFILTRATION; PERINEURAL at 13:24

## 2024-10-30 LAB
CYTO UR: NORMAL
LAB AP CASE REPORT: NORMAL
LAB AP CLINICAL INFORMATION: NORMAL
LAB AP DIAGNOSIS COMMENT: NORMAL
LAB AP SPECIAL STAINS: NORMAL
PATH REPORT.FINAL DX SPEC: NORMAL
PATH REPORT.GROSS SPEC: NORMAL

## 2024-11-01 ENCOUNTER — TELEPHONE (OUTPATIENT)
Dept: MAMMOGRAPHY | Facility: HOSPITAL | Age: 59
End: 2024-11-01
Payer: COMMERCIAL

## 2024-11-01 ENCOUNTER — TELEPHONE (OUTPATIENT)
Dept: GASTROENTEROLOGY | Facility: CLINIC | Age: 59
End: 2024-11-01

## 2024-11-01 NOTE — TELEPHONE ENCOUNTER
Patient called in, requests Dr KARI Martin for surgical consult. Patient will be notified when an appointment is scheduled. Verbalized understanding.

## 2024-11-01 NOTE — TELEPHONE ENCOUNTER
Patient returned call to discuss diagnosis.  Questions answered, patient verbalized understanding.    Patient to contact provider about surgeon choice.  Patient is to call back with decision; an appointment will then be scheduled and patient will be notified. Verbalizes understanding.

## 2024-11-01 NOTE — TELEPHONE ENCOUNTER
Referring provider notified via Epic basket message  pathology returned as cancer and patient will be notified.     Patient notified of pathology results and recommendation. Verbalizes understanding. Denies discomfort.. Denies signs and symptoms of infection. Patient would like to call back this morning for more information.

## 2024-11-01 NOTE — TELEPHONE ENCOUNTER
Patient notified of surgical consult appointment with Dr KARI Martin on 11/11/24 @ 1891 with arrival time of 1415  [address]. Patient given office contact & location information. Patient aware they will receive an information packet from [facility] with detailed location instructions. Patient notified to bring photo ID, insurance information, list of prescription & OTC medications. Patient may be accompanied by family member or friend for support.    Reviewed what would be discussed at surgical consult visit, including detailed explanation of pathology report and imaging reports; treatment options and pros/cons, availability of breast cancer nurse navigator. Patient encouraged to call back with questions or concerns. Breast cancer information packet offered and accepted. Patient verbalized understanding. Patient information sent to breast nurse navigator for evaluation.

## 2024-11-01 NOTE — TELEPHONE ENCOUNTER
"    Hub staff attempted to follow warm transfer process and was unsuccessful     Caller: Jessika Sousa \"Asmita\"    Relationship to patient: Self    Best call back number: [phone]     Patient is needing: JUST RECEIVED NEWS THAT SHE HAS BREAST CANCER AND WAS WONDERING IF SHE COULD SPEAK WITH DIANA LAGUNA BECAUSE OF THE CARE SHE RECEIVED FROM HER PREVIOUSLY AND SEE WHAT RECOMMENDATIONS SHE HAS FOR PROVIDERS.    PT VOICED UNDERSTANDING THAT THIS MIGHT NOT BE SOMETHING THIS OFFICE IS ABLE TO GIVE ADVICE ON.   "
(4) rarely moist

## 2024-11-08 ENCOUNTER — TELEPHONE (OUTPATIENT)
Dept: OBSTETRICS AND GYNECOLOGY | Facility: CLINIC | Age: 59
End: 2024-11-08
Payer: COMMERCIAL

## 2024-11-08 ENCOUNTER — TELEPHONE (OUTPATIENT)
Dept: MAMMOGRAPHY | Facility: HOSPITAL | Age: 59
End: 2024-11-08
Payer: COMMERCIAL

## 2024-11-08 NOTE — TELEPHONE ENCOUNTER
Patient called in and requested to change her surgical consult from Dr. Martin to Dr. Rod.  She was notified of the new appointment for 12/4/24 at 1330 with an arrival time of 1315 at the [address] location.

## 2024-11-08 NOTE — TELEPHONE ENCOUNTER
Patient had questions regarding surgeons for breast calcifications. Patient provided with names of surgeons. Patient v/u.

## 2024-11-08 NOTE — TELEPHONE ENCOUNTER
"    Caller: Jessika Sousa \"Asmita\"    Relationship to patient: Self    Best call back number: [phone] (home)       Patient is needing: A CALL BACK FROM DAVON MENCHACA ABOUT A NEW DX THAT PT HAS AND WOULD LIKE TO SPEAK WITH HER ABOUT IT. (STATES THAT SHE KNOWS YOU FROM OFFICE AND [employer] CLASS)          "

## 2024-11-14 ENCOUNTER — TELEPHONE (OUTPATIENT)
Dept: FAMILY MEDICINE CLINIC | Facility: CLINIC | Age: 59
End: 2024-11-14
Payer: COMMERCIAL

## 2024-11-14 NOTE — TELEPHONE ENCOUNTER
"  Caller: Jessika Sousa \"Dagoberto\"    Relationship: Self    Best call back number: [phone]     What is the best time to reach you: ASAP    Who are you requesting to speak with (clinical staff, provider,  specific staff member): PCP/MA    Do you know the name of the person who called: DAGOBERTO    What was the call regarding: PATIENT IS HAVING A BREAST MRI AND THEY WANT TO KNOW WHICH ARM SHE HAD THE FLU SHOT IN ON VISIT 10/11/24. CALL PATIENT    Is it okay if the provider responds through MyChart: CALLBACK      "

## 2024-11-15 RX ORDER — PANTOPRAZOLE SODIUM 40 MG/1
40 TABLET, DELAYED RELEASE ORAL DAILY
Qty: 30 TABLET | Refills: 0 | Status: SHIPPED | OUTPATIENT
Start: 2024-11-15 | End: 2024-11-15

## 2024-11-15 RX ORDER — PANTOPRAZOLE SODIUM 40 MG/1
40 TABLET, DELAYED RELEASE ORAL DAILY
Qty: 90 TABLET | Refills: 0 | Status: SHIPPED | OUTPATIENT
Start: 2024-11-15

## 2024-11-20 NOTE — TELEPHONE ENCOUNTER
Rx Refill Note  Requested Prescriptions     Pending Prescriptions Disp Refills    atorvastatin (LIPITOR) 20 MG tablet [Pharmacy Med Name: ATORVASTATIN 20MG TABLETS] 90 tablet 0     Sig: TAKE 1 TABLET BY MOUTH DAILY      Last office visit with prescribing clinician: 10/11/2024   Last telemedicine visit with prescribing clinician: Visit date not found   Next office visit with prescribing clinician: 2/3/2025                         Would you like a call back once the refill request has been completed: [] Yes [] No    If the office needs to give you a call back, can they leave a voicemail: [] Yes [] No    Cecilia Cunha MA  11/20/24, 08:19 EST

## 2024-11-21 ENCOUNTER — HOSPITAL ENCOUNTER (OUTPATIENT)
Dept: MRI IMAGING | Facility: HOSPITAL | Age: 59
Discharge: HOME | End: 2024-11-21
Admitting: SURGERY
Payer: COMMERCIAL

## 2024-11-21 DIAGNOSIS — D05.11 INTRADUCTAL CARCINOMA IN SITU OF RIGHT BREAST: ICD-10-CM

## 2024-11-21 PROCEDURE — 77049 MRI BREAST C-+ W/CAD BI: CPT

## 2024-11-21 PROCEDURE — 25510000002 GADOBENATE DIMEGLUMINE 529 MG/ML SOLUTION: Performed by: SURGERY

## 2024-11-21 PROCEDURE — A9577 INJ MULTIHANCE: HCPCS | Performed by: SURGERY

## 2024-11-21 PROCEDURE — 77049 MRI BREAST C-+ W/CAD BI: CPT | Performed by: RADIOLOGY

## 2024-11-21 RX ORDER — ATORVASTATIN CALCIUM 20 MG/1
20 TABLET, FILM COATED ORAL DAILY
Qty: 90 TABLET | Refills: 0 | OUTPATIENT
Start: 2024-11-21

## 2024-11-21 RX ADMIN — GADOBENATE DIMEGLUMINE 19 ML: 529 INJECTION, SOLUTION INTRAVENOUS at 13:48

## 2024-11-22 ENCOUNTER — TELEPHONE (OUTPATIENT)
Dept: MRI IMAGING | Facility: HOSPITAL | Age: 59
End: 2024-11-22
Payer: COMMERCIAL

## 2024-11-22 NOTE — TELEPHONE ENCOUNTER
Left message on voicemail for patient to return call to go over her MRI Breast results. Email sent to the nurse navigator.

## 2024-11-22 NOTE — TELEPHONE ENCOUNTER
Called patient with MRI Breast results. Recommended surgical follow up. Dr. Power had just spoke with the patient about her results. Pt expressed understanding and was encouraged to call with any further questions or concerns.

## 2024-11-25 RX ORDER — ATORVASTATIN CALCIUM 40 MG/1
40 TABLET, FILM COATED ORAL DAILY
Qty: 30 TABLET | Refills: 2 | Status: SHIPPED | OUTPATIENT
Start: 2024-11-25

## 2024-11-26 ENCOUNTER — TRANSCRIBE ORDERS (OUTPATIENT)
Dept: ADMINISTRATIVE | Facility: HOSPITAL | Age: 59
End: 2024-11-26
Payer: COMMERCIAL

## 2024-11-26 DIAGNOSIS — D05.11 INTRADUCTAL CARCINOMA IN SITU OF RIGHT BREAST: Primary | ICD-10-CM

## 2024-12-02 ENCOUNTER — LAB (OUTPATIENT)
Dept: LAB | Facility: HOSPITAL | Age: 59
End: 2024-12-02
Payer: COMMERCIAL

## 2024-12-02 ENCOUNTER — OFFICE VISIT (OUTPATIENT)
Dept: FAMILY MEDICINE CLINIC | Facility: CLINIC | Age: 59
End: 2024-12-02
Payer: COMMERCIAL

## 2024-12-02 VITALS
HEART RATE: 80 BPM | RESPIRATION RATE: 20 BRPM | SYSTOLIC BLOOD PRESSURE: 120 MMHG | OXYGEN SATURATION: 98 % | TEMPERATURE: 97.8 F | DIASTOLIC BLOOD PRESSURE: 80 MMHG | BODY MASS INDEX: 37.98 KG/M2 | WEIGHT: 206.4 LBS | HEIGHT: 62 IN

## 2024-12-02 DIAGNOSIS — E78.49 OTHER HYPERLIPIDEMIA: ICD-10-CM

## 2024-12-02 DIAGNOSIS — K21.9 GASTROESOPHAGEAL REFLUX DISEASE WITHOUT ESOPHAGITIS: ICD-10-CM

## 2024-12-02 DIAGNOSIS — D05.11 DUCTAL CARCINOMA IN SITU (DCIS) OF RIGHT BREAST: ICD-10-CM

## 2024-12-02 DIAGNOSIS — Z01.818 PREOPERATIVE CLEARANCE: ICD-10-CM

## 2024-12-02 DIAGNOSIS — F41.9 ANXIETY: ICD-10-CM

## 2024-12-02 DIAGNOSIS — Z01.818 PREOPERATIVE CLEARANCE: Primary | ICD-10-CM

## 2024-12-02 LAB
ALBUMIN SERPL-MCNC: 4.2 G/DL (ref 3.5–5.2)
ALBUMIN/GLOB SERPL: 1.4 G/DL
ALP SERPL-CCNC: 74 U/L (ref 39–117)
ALT SERPL W P-5'-P-CCNC: 28 U/L (ref 1–33)
ANION GAP SERPL CALCULATED.3IONS-SCNC: 11.8 MMOL/L (ref 5–15)
AST SERPL-CCNC: 25 U/L (ref 1–32)
BASOPHILS # BLD AUTO: 0.05 10*3/MM3 (ref 0–0.2)
BASOPHILS NFR BLD AUTO: 0.6 % (ref 0–1.5)
BILIRUB SERPL-MCNC: 0.5 MG/DL (ref 0–1.2)
BUN SERPL-MCNC: 11 MG/DL (ref 6–20)
BUN/CREAT SERPL: 13.4 (ref 7–25)
CALCIUM SPEC-SCNC: 9.1 MG/DL (ref 8.6–10.5)
CHLORIDE SERPL-SCNC: 106 MMOL/L (ref 98–107)
CO2 SERPL-SCNC: 22.2 MMOL/L (ref 22–29)
CREAT SERPL-MCNC: 0.82 MG/DL (ref 0.57–1)
DEPRECATED RDW RBC AUTO: 42.4 FL (ref 37–54)
EGFRCR SERPLBLD CKD-EPI 2021: 83 ML/MIN/1.73
EOSINOPHIL # BLD AUTO: 0.26 10*3/MM3 (ref 0–0.4)
EOSINOPHIL NFR BLD AUTO: 3 % (ref 0.3–6.2)
ERYTHROCYTE [DISTWIDTH] IN BLOOD BY AUTOMATED COUNT: 13 % (ref 12.3–15.4)
GLOBULIN UR ELPH-MCNC: 3.1 GM/DL
GLUCOSE SERPL-MCNC: 91 MG/DL (ref 65–99)
HCT VFR BLD AUTO: 46 % (ref 34–46.6)
HGB BLD-MCNC: 14.7 G/DL (ref 12–15.9)
IMM GRANULOCYTES # BLD AUTO: 0.04 10*3/MM3 (ref 0–0.05)
IMM GRANULOCYTES NFR BLD AUTO: 0.5 % (ref 0–0.5)
LYMPHOCYTES # BLD AUTO: 2.23 10*3/MM3 (ref 0.7–3.1)
LYMPHOCYTES NFR BLD AUTO: 25.7 % (ref 19.6–45.3)
MCH RBC QN AUTO: 28.4 PG (ref 26.6–33)
MCHC RBC AUTO-ENTMCNC: 32 G/DL (ref 31.5–35.7)
MCV RBC AUTO: 88.8 FL (ref 79–97)
MONOCYTES # BLD AUTO: 0.64 10*3/MM3 (ref 0.1–0.9)
MONOCYTES NFR BLD AUTO: 7.4 % (ref 5–12)
NEUTROPHILS NFR BLD AUTO: 5.45 10*3/MM3 (ref 1.7–7)
NEUTROPHILS NFR BLD AUTO: 62.8 % (ref 42.7–76)
NRBC BLD AUTO-RTO: 0 /100 WBC (ref 0–0.2)
PLATELET # BLD AUTO: 295 10*3/MM3 (ref 140–450)
PMV BLD AUTO: 10.4 FL (ref 6–12)
POTASSIUM SERPL-SCNC: 4.1 MMOL/L (ref 3.5–5.2)
PROT SERPL-MCNC: 7.3 G/DL (ref 6–8.5)
RBC # BLD AUTO: 5.18 10*6/MM3 (ref 3.77–5.28)
SODIUM SERPL-SCNC: 140 MMOL/L (ref 136–145)
WBC NRBC COR # BLD AUTO: 8.67 10*3/MM3 (ref 3.4–10.8)

## 2024-12-02 PROCEDURE — 80053 COMPREHEN METABOLIC PANEL: CPT

## 2024-12-02 PROCEDURE — 99214 OFFICE O/P EST MOD 30 MIN: CPT | Performed by: HOSPITALIST

## 2024-12-02 PROCEDURE — 93000 ELECTROCARDIOGRAM COMPLETE: CPT | Performed by: HOSPITALIST

## 2024-12-02 PROCEDURE — 36415 COLL VENOUS BLD VENIPUNCTURE: CPT

## 2024-12-02 PROCEDURE — 85025 COMPLETE CBC W/AUTO DIFF WBC: CPT

## 2024-12-02 NOTE — ASSESSMENT & PLAN NOTE
EKG reviewed and stable. CBC and CMP pending and will be reviewed.  I have examined the patient and certify that to the best of my knowledge there is not a medical contraindication for undergoing right breast lumpectomy with general and/or regional anesthesia.

## 2024-12-02 NOTE — ASSESSMENT & PLAN NOTE
Continue protonix   Patient had appt 1/14/2021, after exam was complete patient decided she would like to start Lexapro as discussed in appt.

## 2024-12-02 NOTE — PROGRESS NOTES
Follow Up Office Visit      Patient Name: Jessika Sousa  : 1965   MRN: [id number]     Chief Complaint:  Pre Op Physical     History of Present Illness: Jessika Sousa is a 58 y.o. female who is here today for surgical clearance for lumpectomy. Patient had an abnormal mammogram on 10/12/24 with subsequent breast biopsy with pathology on 10/25/24 returning positive for high grade ductal carcinoma. The patient has a lumpectomy planned for 24 with Dr. Power. The patient has no acute complaints. Patient denies chest pain and shortness of air. Denies fever.    Hyperlipdemia: stable on Lipitor   Anxiety: Celexa  GERD: stable on Protonix   Vitamin B12 Deficiency: stable on replacement        Subjective     Subjective          The following portions of the patient's history were reviewed and updated as appropriate: allergies, current medications, past family history, past medical history, past social history, past surgical history and problem list.    Allergy:   No Known Allergies     Current Medications:   Current Outpatient Medications   Medication Sig Dispense Refill    atorvastatin (LIPITOR) 40 MG tablet TAKE 1 TABLET BY MOUTH DAILY 30 tablet 2    cholestyramine (QUESTRAN) 4 g packet MIX AND DRINK 1 PACKET BY MOUTH DAILY AS NEEDED FOR DIARRHEA 90 each 0    citalopram (CeleXA) 20 MG tablet TAKE 1 TABLET BY MOUTH DAILY 90 tablet 0    clindamycin (CLEOCIN T) 1 % lotion APPLY A THIN LAYER TO THE AFFECTED AREA(S) BY TOPICAL ROUTE once a day      clotrimazole (LOTRIMIN) 1 % external solution Apply  topically to the appropriate area as directed 2 (Two) Times a Day. 1 mL 0    fexofenadine-pseudoephedrine (Allegra[facility] Allergy [facility] Congestion) 180-240 MG per 24 hr tablet Take 1 tablet by mouth Daily. 30 tablet 1    ipratropium (ATROVENT) 0.03 % nasal spray 2 sprays into the nostril(s) as directed by provider Every 12 (Twelve) Hours. 1 each 3    pantoprazole (PROTONIX) 40 MG EC tablet TAKE 1 TABLET BY MOUTH  "DAILY 90 tablet 0    vitamin B-12 (CYANOCOBALAMIN) 1000 MCG tablet Take 1 tablet by mouth Daily. 90 tablet 3     Current Facility-Administered Medications   Medication Dose Route Frequency Provider Last Rate Last Admin    cyanocobalamin injection 1,000 mcg  1,000 mcg Intramuscular Q28 Days Kajal Fairbanks, DO   1,000 mcg at 09/10/20 1041    cyanocobalamin injection 1,000 mcg  1,000 mcg Intramuscular Q28 Days Kajal Fairbanks A, DO   1,000 mcg at 01/10/20 1242    cyanocobalamin injection 1,000 mcg  1,000 mcg Intramuscular Q28 Days Belgica Fairbanksh A, DO   1,000 mcg at 04/21/21 1152    cyanocobalamin injection 1,000 mcg  1,000 mcg Intramuscular Q28 Days Kajal Fairbanks A, DO   1,000 mcg at 11/01/21 1120       Objective     Objective     Physical Exam:  Physical Exam  Vitals and nursing note reviewed.   Constitutional:       Appearance: Normal appearance.   HENT:      Head: Normocephalic and atraumatic.      Mouth/Throat:      Mouth: Mucous membranes are moist.   Eyes:      Pupils: Pupils are equal, round, and reactive to light.   Cardiovascular:      Rate and Rhythm: Normal rate and regular rhythm.      Heart sounds: Normal heart sounds.   Pulmonary:      Effort: Pulmonary effort is normal.      Breath sounds: Normal breath sounds.   Abdominal:      General: Bowel sounds are normal.      Palpations: Abdomen is soft.   Musculoskeletal:         General: Normal range of motion.      Cervical back: Normal range of motion.   Skin:     General: Skin is warm and dry.   Neurological:      General: No focal deficit present.      Mental Status: She is alert and oriented to person, place, and time.   Psychiatric:         Mood and Affect: Mood normal.         Behavior: Behavior normal.         Vital Signs:   /80   Pulse 80   Temp 97.8 °F (36.6 °C) (Temporal)   Resp 20   Ht 157.5 cm (62.01\")   Wt 93.6 kg (206 lb 6.4 oz)   SpO2 98%   BMI 37.74 kg/m²            PHQ-9 Score  PHQ-9 Total Score:      Lab Review  Hospital Outpatient " "Visit on 10/25/2024   Component Date Value Ref Range Status    Case Report 10/25/2024    Final                    Value:Surgical Pathology Report                         Case: [id number]                                  Authorizing Provider:  Shirin Dave DO         Collected:           10/25/2024 01:29 PM          Ordering Location:     [facility]   Received:            10/25/2024 01:48 PM                                 [facility]                                                                Pathologist:           Yonis Hong MD                                                        Specimen:    Breast, Right, RT UOQ CALC'S                                                               Clinical Information 10/25/2024    Final                    Value:Right breast UOQ calcifications      Final Diagnosis 10/25/2024    Final                    Value:RIGHT BREAST, UPPER OUTER QUADRANT, STEREOTACTIC BIOPSY:  Ductal carcinoma in situ, high-grade, solid pattern.  Hyalinized fibroadenoma with associated calcification.  Duct carcinoma in situ is negative for estrogen receptor and progesterone receptor by immunohistochemistry.      Comment 10/25/2024    Final                    Value:There are portions of hyalinized fibroadenoma with coarse stromal calcifications present.  In addition, multiple ducts and areas appearing to represent lobules contain markedly atypical epithelium growing in a solid pattern.  For further evaluation, immunohistochemical staining for the myoepithelial markers calponin, p63 and smooth muscle myosin heavy chain were undertaken with appropriate controls.  Myoepithelial cells are seen associated with the atypical epithelium supporting interpretation is an in situ process.        Gross Description 10/25/2024    Final                    Value:1. Breast, Right.  Received in formalin labeled \"right breast UOQ affirm stereotactic biopsy\" is a breast core collection device " divided into 12 compartments labeled A- L that contain a 3 x 2.5 x 0.4 cm aggregate of yellow-white fibroadipose breast tissue cores.  The tissue in compartments D, H, J and K are designated by the radiologist as calcifications and are submitted in block 1A.  The remaining tissue is submitted in block 1B.  The tissue is placed in formalin at 1329 on 10/25/2024, has a cold ischemic time less than 60 minutes and a total time in formalin greater than 6 and less than 72 hours. HDM        Special Stains 10/25/2024    Final                    Value:IMMUNOHISTOCHEMICAL RESULTS (IHC):  Estrogen Receptor            RESULT: Negative        0%          0+ (INTENSITY SCORE)   Progesterone Receptor     RESULT: Negative        0%          0+ (INTENSITY SCORE)     Internal control cells for ER: Present and stain as expected  Internal control cells for PA: Present and stain as expected    Tissue cold time prior to fixation:  estimated <10 minutes  Total fixation time (neutral buffered formalin): >6 and <72 hours    Estrogen receptor ([place] clone SP1, polymer, IVD), progresterone receptor ([place] clone IE2, polymer, IVD), and HER-2/dakota oncoprotein ([place] clone 4B5, polymer, IVD) are performed on buffered formalin fixed, paraffin embedded tissue and is performed according to FDA approved protocol and interpreted per 2018 ASCO/CAP guidelines. Variables that can affect the reliability of the assays include cold ischemia time prior to fixation and total fixation time outside of ASCO/CAP guidelines, processing with decalcification agents,                           and fixatives other than 10% neutral buffered formalin.        Microscopic Description 10/25/2024    Final                    Value:The slides are reviewed and demonstrate histopathologic features supporting the above rendered diagnosis.       Orders Only on 10/22/2024   Component Date Value Ref Range Status    TyrerCuzick 10/22/2024 19.1   Final    NCCN 10/22/2024 NCCN  not met   Final    High Risk Cancer Risk Assessment   Lab on 10/11/2024   Component Date Value Ref Range Status    WBC 10/11/2024 8.56  3.40 - 10.80 10*3/mm3 Final    RBC 10/11/2024 5.22  3.77 - 5.28 10*6/mm3 Final    Hemoglobin 10/11/2024 15.8  12.0 - 15.9 g/dL Final    Hematocrit 10/11/2024 46.2  34.0 - 46.6 % Final    MCV 10/11/2024 88.5  79.0 - 97.0 fL Final    MCH 10/11/2024 30.3  26.6 - 33.0 pg Final    MCHC 10/11/2024 34.2  31.5 - 35.7 g/dL Final    RDW 10/11/2024 13.5  12.3 - 15.4 % Final    RDW-SD 10/11/2024 43.0  37.0 - 54.0 fl Final    MPV 10/11/2024 10.4  6.0 - 12.0 fL Final    Platelets 10/11/2024 307  140 - 450 10*3/mm3 Final    Glucose 10/11/2024 88  65 - 99 mg/dL Final    BUN 10/11/2024 14  6 - 20 mg/dL Final    Creatinine 10/11/2024 0.86  0.57 - 1.00 mg/dL Final    Sodium 10/11/2024 139  136 - 145 mmol/L Final    Potassium 10/11/2024 4.2  3.5 - 5.2 mmol/L Final    Chloride 10/11/2024 104  98 - 107 mmol/L Final    CO2 10/11/2024 22.0  22.0 - 29.0 mmol/L Final    Calcium 10/11/2024 9.9  8.6 - 10.5 mg/dL Final    Total Protein 10/11/2024 7.6  6.0 - 8.5 g/dL Final    Albumin 10/11/2024 4.7  3.5 - 5.2 g/dL Final    ALT (SGPT) 10/11/2024 38 (H)  1 - 33 U/L Final    AST (SGOT) 10/11/2024 30  1 - 32 U/L Final    Alkaline Phosphatase 10/11/2024 70  39 - 117 U/L Final    Total Bilirubin 10/11/2024 0.4  0.0 - 1.2 mg/dL Final    Globulin 10/11/2024 2.9  gm/dL Final    A/G Ratio 10/11/2024 1.6  g/dL Final    BUN/Creatinine Ratio 10/11/2024 16.3  7.0 - 25.0 Final    Anion Gap 10/11/2024 13.0  5.0 - 15.0 mmol/L Final    eGFR 10/11/2024 78.4  >60.0 mL/min/1.73 Final    Hemoglobin A1C 10/11/2024 5.60  4.80 - 5.60 % Final    Total Cholesterol 10/11/2024 185  0 - 200 mg/dL Final    Triglycerides 10/11/2024 83  0 - 150 mg/dL Final    HDL Cholesterol 10/11/2024 56  40 - 60 mg/dL Final    LDL Cholesterol  10/11/2024 114 (H)  0 - 100 mg/dL Final    VLDL Cholesterol 10/11/2024 15  5 - 40 mg/dL Final    LDL/HDL Ratio  10/11/2024 2.01   Final    TSH 10/11/2024 1.590  0.270 - 4.200 uIU/mL Final    Vitamin B-12 10/11/2024 541  211 - 946 pg/mL Final    25 Hydroxy, Vitamin D 10/11/2024 27.8 (L)  30.0 - 100.0 ng/ml Final    Hepatitis B Surface Ag 10/11/2024 Negative  Negative Final    Hep B S Ab 10/11/2024 Reactive   Final                 Non Reactive: Not immune to HBV infection.               Equivocal: Unable to determine if anti-HBs                          is present at levels consistent                          with immunity.                Reactive: Anti-HBs concentration detected                          at greater than 10 mIU/mL.                          Individual is considered to be                          immune to infection with HBV.    Hep B Core Total Ab 10/11/2024 Negative  Negative Final    RFX TO HBC IGM 10/11/2024 Comment   Final    Reflex criteria was not met.    Interpretation 10/11/2024 Comment   Final    Comment:                    HBV Serology Interpretation Chart   -------------------------------------------------------------------   Interpretation             HBsAg  anti-HBs  anti-HBc  anti-HBc IgM   -------------------------------------------------------------------   Key - Analyte present: + Analyte absent: - Test not indicated: TNI   -------------------------------------------------------------------   Susceptible (never   infected and no evidence    -        -         -          TNI   of vaccination)   -------------------------------------------------------------------   Immune due to natural   resolved infection          -        +         +          TNI   -------------------------------------------------------------------   Immune due to vaccination   -        +         -          TNI   -------------------------------------------------------------------   Acute Infection             +        -         +           +   -------------------------------------------------------------------                      "         Chronic infection           +        -         +           -   -------------------------------------------------------------------   Interpretation unclear*     -        -         +          +/-   -------------------------------------------------------------------   *Multiple possibilities: resolved infection (most common); false-    positive anti-HBc (susceptible); \"low-level\" chronic infection\";    resolving acute infection.   Office Visit on 10/11/2024   Component Date Value Ref Range Status    Reference Lab Report 10/11/2024    Final                    Value:[facility]  [address] [place] [address]  Phone: [phone] or [phone]  Fax: [phone]  Qasim Aguilar M.D., Medical Director    PATIENT NAME                           LABORATORY NO.  WHITLEY PINTO                     [id number]  [id number]                         AGE              SEX  SSN           CLIENT REF #  [facility]  58      1965  F    [id number]   [id number]    [address] [place] [address]                 REQUESTING M.D.     ATTENDING M.D.     COPY TO.  [facility] [address] [place] [address]                DATE COLLECTED      DATE RECEIVED      DATE REPORTED  10/11/2024          10/11/2024         10/15/2024    DIAGNOSIS:  SKIN, RIGHT LEG:  Mild papillary epidermal hyperplasia with spotty parakeratosis and chronic  inflammation  GMS stain negative for fungal organisms    COMMENT:  A GMS stain, evaluated with the appropriate control, fails to  demonstrate fungal                           organisms.  These findings are not entirely specific and are  features which potentially could be seen with confluent and reticulated  papillomatosis, and early seborrheic keratosis with associated inflammation, or  less likely, a chronic eczematous dermatitis.  Clinical correlation is needed.    CLINICAL HISTORY:  Skin rash    SPECIMENS " "RECEIVED:  SKIN, RIGHT LEG    MICROSCOPIC DESCRIPTION:  Tissue blocks are prepared and slides are examined microscopically on all  specimens. See diagnosis for details.    Professional interpretation rendered by Michelle Claros M.D., EARLENE at  [facility], [address] [place] [address].    GROSS DESCRIPTION:  Labeled \"leg\" per requisition is a 0.3 x 0.2 x 0.1 cm of tan wrinkled skin.  The  surgical margin is inked blue and submitted entirely in 1 cassette.  BAW    REVIEWED, DIAGNOSED AND ELECTRONICALLY  SIGNED BY:    Michelle Claros M.D., LASHON.  CPT CODES:  91121, 45988          Radiology Results  MRI Breast Bilateral Diagnostic W WO Contrast    Result Date: 11/21/2024  Impression: The biopsy-proven ductal carcinoma in the right breast corresponds to segmental non-mass enhancement as described above which measures up to 3.5 cm in size. If the patient desires breast conservation then bracket needle localization of the mammographic calcifications is recommended. There is no evidence of multifocal or multicentric disease. No MRI abnormality is seen on the left. There is no evidence of adenopathy.  BI-RADS CATEGORY: 6, KNOWN BIOPSY PROVEN MALIGNANCY  Recommend appropriate surgical/oncology follow-up.   This report was finalized on 11/21/2024 2:28 PM by Dr. Akilah Power MD.        Assessment / Plan         Assessment and Plan   Diagnoses and all orders for this visit:    1. Preoperative clearance (Primary)  Assessment & Plan:  EKG reviewed and stable. CBC and CMP pending and will be reviewed.  I have examined the patient and certify that to the best of my knowledge there is not a medical contraindication for undergoing right breast lumpectomy with general and/or regional anesthesia.     Orders:  -     CBC & Differential; Future  -     Comprehensive metabolic panel; Future  -     ECG 12 Lead    2. Ductal carcinoma in situ (DCIS) of right breast  -     CBC & Differential; Future  -     Comprehensive " metabolic panel; Future  -     ECG 12 Lead    3. Gastroesophageal reflux disease without esophagitis  Assessment & Plan:  Continue protonix      4. Other hyperlipidemia  Assessment & Plan:  Continue statin       5. Anxiety  Assessment & Plan:  Continue celexa; denies SI/HI                  Health Maintenance  Health Maintenance:   Health Maintenance Due   Topic Date Due    TDAP/TD VACCINES (2 - Td or Tdap) 01/08/2018        Meds ordered during this visit  No orders of the defined types were placed in this encounter.      Meds stopped during this visit:  Medications Discontinued During This Encounter   Medication Reason    minocycline (Minocin) 100 MG capsule Historical Med - Therapy completed        Patient was given instructions and counseling regarding her condition or for health maintenance advice. Please see specific information pulled into the AVS if appropriate.     Follow Up   No follow-ups on file.    Claire Maldonado DO  [facility]     Dictated Utilizing Dragon Dictation: Part of this note may be an electronic transcription/translation of spoken language to printed text using the Dragon Dictation System.    This document has been electronically signed by Claire Maldonado DO   December 2, 2024 08:51 EST

## 2024-12-03 ENCOUNTER — HOSPITAL ENCOUNTER (OUTPATIENT)
Dept: MAMMOGRAPHY | Facility: HOSPITAL | Age: 59
Discharge: HOME | End: 2024-12-03
Payer: COMMERCIAL

## 2024-12-03 DIAGNOSIS — D05.11 INTRADUCTAL CARCINOMA IN SITU OF RIGHT BREAST: ICD-10-CM

## 2024-12-03 PROCEDURE — A4648 IMPLANTABLE TISSUE MARKER: HCPCS

## 2024-12-03 PROCEDURE — 25010000002 LIDOCAINE 1 % SOLUTION: Performed by: RADIOLOGY

## 2024-12-03 RX ORDER — LIDOCAINE HYDROCHLORIDE 10 MG/ML
10 INJECTION, SOLUTION INFILTRATION; PERINEURAL ONCE
Status: COMPLETED | OUTPATIENT
Start: 2024-12-03 | End: 2024-12-03

## 2024-12-03 RX ADMIN — LIDOCAINE HYDROCHLORIDE 3 ML: 10 INJECTION, SOLUTION INFILTRATION; PERINEURAL at 09:39

## 2024-12-04 ENCOUNTER — HOSPITAL ENCOUNTER (OUTPATIENT)
Dept: MAMMOGRAPHY | Facility: HOSPITAL | Age: 59
Discharge: HOME | End: 2024-12-04
Payer: COMMERCIAL

## 2024-12-04 ENCOUNTER — LAB REQUISITION (OUTPATIENT)
Dept: LAB | Facility: HOSPITAL | Age: 59
End: 2024-12-04
Payer: COMMERCIAL

## 2024-12-04 DIAGNOSIS — D05.11 INTRADUCTAL CARCINOMA IN SITU OF RIGHT BREAST: ICD-10-CM

## 2024-12-04 PROCEDURE — 76098 X-RAY EXAM SURGICAL SPECIMEN: CPT

## 2024-12-04 PROCEDURE — 88341 IMHCHEM/IMCYTCHM EA ADD ANTB: CPT | Performed by: SURGERY

## 2024-12-04 PROCEDURE — 88305 TISSUE EXAM BY PATHOLOGIST: CPT | Performed by: SURGERY

## 2024-12-04 PROCEDURE — 88360 TUMOR IMMUNOHISTOCHEM/MANUAL: CPT | Performed by: SURGERY

## 2024-12-04 PROCEDURE — 88307 TISSUE EXAM BY PATHOLOGIST: CPT | Performed by: SURGERY

## 2024-12-12 LAB
CYTO UR: NORMAL
LAB AP CASE REPORT: NORMAL
LAB AP CLINICAL INFORMATION: NORMAL
LAB AP DIAGNOSIS COMMENT: NORMAL
LAB AP SYNOPTIC CHECKLIST: NORMAL
PATH REPORT.ADDENDUM SPEC: NORMAL
PATH REPORT.FINAL DX SPEC: NORMAL
PATH REPORT.GROSS SPEC: NORMAL

## 2024-12-13 ENCOUNTER — HOSPITAL ENCOUNTER (OUTPATIENT)
Facility: HOSPITAL | Age: 59
Setting detail: RADIATION/ONCOLOGY SERIES
End: 2024-12-13
Payer: COMMERCIAL

## 2024-12-13 DIAGNOSIS — F33.0 MILD EPISODE OF RECURRENT DEPRESSIVE DISORDER: Chronic | ICD-10-CM

## 2024-12-13 RX ORDER — CITALOPRAM HYDROBROMIDE 20 MG/1
20 TABLET ORAL DAILY
Qty: 90 TABLET | Refills: 0 | Status: SHIPPED | OUTPATIENT
Start: 2024-12-13

## 2024-12-16 ENCOUNTER — TELEPHONE (OUTPATIENT)
Dept: FAMILY MEDICINE CLINIC | Facility: CLINIC | Age: 59
End: 2024-12-16
Payer: COMMERCIAL

## 2024-12-16 DIAGNOSIS — E78.5 HYPERLIPIDEMIA, UNSPECIFIED HYPERLIPIDEMIA TYPE: Primary | ICD-10-CM

## 2024-12-16 NOTE — TELEPHONE ENCOUNTER
"  Caller: Jessika Sousa \"Asmita\"    Relationship: Self    Best call back number: [phone]     What is the best time to reach you: ANYTIME     Who are you requesting to speak with (clinical staff, provider,  specific staff member): CLINICAL STAFF    What was the call regarding: PATIENT THOUGHT SHE NEEDED TO HAVE HER CHOLESTEROL CHECKED AFTER HAVING HER MEDICATION DOSAGE CHANGED. SHE WOULD LIKE TO SEE ABOUT GETTING THE ORDERS CALLED IN FOR HER.     Is it okay if the provider responds through MyChart: YES  "

## 2024-12-17 ENCOUNTER — TELEPHONE (OUTPATIENT)
Age: 59
End: 2024-12-17
Payer: COMMERCIAL

## 2024-12-17 ENCOUNTER — OFFICE VISIT (OUTPATIENT)
Age: 59
End: 2024-12-17
Payer: COMMERCIAL

## 2024-12-17 ENCOUNTER — LAB (OUTPATIENT)
Facility: HOSPITAL | Age: 59
End: 2024-12-17
Payer: COMMERCIAL

## 2024-12-17 VITALS
HEIGHT: 62 IN | RESPIRATION RATE: 20 BRPM | DIASTOLIC BLOOD PRESSURE: 91 MMHG | SYSTOLIC BLOOD PRESSURE: 143 MMHG | TEMPERATURE: 98.2 F | BODY MASS INDEX: 37.36 KG/M2 | HEART RATE: 74 BPM | OXYGEN SATURATION: 96 % | WEIGHT: 203 LBS

## 2024-12-17 DIAGNOSIS — E78.5 HYPERLIPIDEMIA, UNSPECIFIED HYPERLIPIDEMIA TYPE: ICD-10-CM

## 2024-12-17 DIAGNOSIS — C50.911 DUCTAL CARCINOMA IN SITU (DCIS) OF RIGHT BREAST WITH MICROINVASIVE COMPONENT: Primary | ICD-10-CM

## 2024-12-17 LAB
CHOLEST SERPL-MCNC: 184 MG/DL (ref 0–200)
HDLC SERPL-MCNC: 55 MG/DL (ref 40–60)
LDLC SERPL CALC-MCNC: 111 MG/DL (ref 0–100)
LDLC/HDLC SERPL: 1.98 {RATIO}
TRIGL SERPL-MCNC: 101 MG/DL (ref 0–150)
VLDLC SERPL-MCNC: 18 MG/DL (ref 5–40)

## 2024-12-17 PROCEDURE — G0463 HOSPITAL OUTPT CLINIC VISIT: HCPCS

## 2024-12-17 PROCEDURE — 36415 COLL VENOUS BLD VENIPUNCTURE: CPT

## 2024-12-17 PROCEDURE — 80061 LIPID PANEL: CPT

## 2024-12-17 NOTE — TELEPHONE ENCOUNTER
Called patient and LVM for ReEval with Dr. Frey scheduled for 12/30/24 @ 2:30pm. Requested patient return call and confirm that she received the appt information.

## 2024-12-17 NOTE — PROGRESS NOTES
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      Chief Complaint:    Chief Complaint   Patient presents with    Breast Cancer       History of Present Illness: Jessika Sousa is a 58 y.o. female who is here for consultation regarding her screening detected DCIS with microinvasion of the right breast. Screening mammography identified an abnormality in the central portion of the right breast which was further characterized on diagnostic mammo/US.   Stereotactic core biopsy - high grade DCIS, ER-/ID-  Bilateral breast MRI - no evidence of multifocal, multicentric, regional, or contralateral disease  Right lumpectomy - high grade DCIS with focal microinvasion, extended margins taken and appropriately negative (greater than 5mm invasive carcinoma, greater than 10 mm DCIS), pT1mi, -/-/-    She is recovering well. She and her  are very active, they have retired.    Age at menarche:  13  Age at menopause:  50  Hormone replacement therapy:  No  Personal history of breast cancer:  No  Family history of breast cancer:  Yes - Mother and Maternal Grandmother  Radiation to chest before age of 30:  No  Age of first live birth:  30    Prior Radiation History: no  Pacemaker or ICD: no  Pregnant or Nursing: N/A    Subjective      Review of Systems: Review of Systems   Musculoskeletal:         Pt reports some mild pain at axillary incision site.        Past Medical History:   Past Medical History:   Diagnosis Date    Allergic     Hay fever    Cancer Oct 2024    Breast    Cholelithiasis Juky 2023    Take about having it removed question will it make IBS worse Hida scan was 32    Colon  polyp 2020    Diverticulosis 2023    Fibroid 2010    Approximately    GERD (gastroesophageal reflux disease) 2023    Taking medicine for it    Hyperlipidemia 2022    Taking medicine for it    Infectious viral hepatitis 2022?    Food related one    Irritable bowel syndrome     Varicella Not sure       Past Surgical History:   Past Surgical History:   Procedure Laterality Date    BREAST BIOPSY N/A     side unknown    COLONOSCOPY      ENDOMETRIAL ABLATION      2013    FOOT SURGERY      Bilateral Foot Spur Remove     LAPAROSCOPIC CHOLECYSTECTOMY  Nov 2023    UPPER GASTROINTESTINAL ENDOSCOPY      WISDOM TOOTH EXTRACTION  1980       Family History:   Family History   Problem Relation Age of Onset    Colon polyps Father     Ulcers Father     Liver disease Father     Stroke Mother     Breast cancer Mother         post menopause    Hypertension Mother     Cancer Mother         Breast cancer    Diabetes Brother     Heart disease Brother         Corinary Artery Disease    Stroke Brother     Heart disease Sister     Stroke Sister     Diabetes Sister     Cancer Sister         Skin Cancer (nose)    Pancreatitis Sister     Diabetes Sister     Liver disease Sister     No Known Problems Paternal Grandmother     Breast cancer Maternal Grandmother         Post menopause    Cancer Maternal Grandmother         Breast Cancer    Ovarian cancer Neg Hx     Esophageal cancer Neg Hx     Uterine cancer Neg Hx     Colon cancer Neg Hx        Social History:   Social History     Socioeconomic History    Marital status:    Tobacco Use    Smoking status: Never     Passive exposure: Never    Smokeless tobacco: Never   Vaping Use    Vaping status: Never Used   Substance and Sexual Activity    Alcohol use: Yes     Alcohol/week: 3.0 standard drinks of alcohol     Types: 3 Glasses of wine per week     Comment: A month    Drug use: No    Sexual activity: Yes     Partners: Male     Birth control/protection: Vasectomy       Medications:     Current  Outpatient Medications:     atorvastatin (LIPITOR) 40 MG tablet, TAKE 1 TABLET BY MOUTH DAILY, Disp: 30 tablet, Rfl: 2    cholestyramine (QUESTRAN) 4 g packet, MIX AND DRINK 1 PACKET BY MOUTH DAILY AS NEEDED FOR DIARRHEA, Disp: 90 each, Rfl: 0    citalopram (CeleXA) 20 MG tablet, TAKE 1 TABLET BY MOUTH DAILY, Disp: 90 tablet, Rfl: 0    fexofenadine-pseudoephedrine (Allegra-D Allergy & Congestion) 180-240 MG per 24 hr tablet, Take 1 tablet by mouth Daily., Disp: 30 tablet, Rfl: 1    pantoprazole (PROTONIX) 40 MG EC tablet, TAKE 1 TABLET BY MOUTH DAILY, Disp: 90 tablet, Rfl: 0    vitamin B-12 (CYANOCOBALAMIN) 1000 MCG tablet, Take 1 tablet by mouth Daily., Disp: 90 tablet, Rfl: 3    clindamycin (CLEOCIN T) 1 % lotion, APPLY A THIN LAYER TO THE AFFECTED AREA(S) BY TOPICAL ROUTE once a day (Patient not taking: Reported on 12/17/2024), Disp: , Rfl:     clotrimazole (LOTRIMIN) 1 % external solution, Apply  topically to the appropriate area as directed 2 (Two) Times a Day. (Patient not taking: Reported on 12/17/2024), Disp: 1 mL, Rfl: 0    ipratropium (ATROVENT) 0.03 % nasal spray, 2 sprays into the nostril(s) as directed by provider Every 12 (Twelve) Hours. (Patient not taking: Reported on 12/17/2024), Disp: 1 each, Rfl: 3    Current Facility-Administered Medications:     cyanocobalamin injection 1,000 mcg, 1,000 mcg, Intramuscular, Q28 Days, Kajal Fairbanks DO, 1,000 mcg at 09/10/20 1041    cyanocobalamin injection 1,000 mcg, 1,000 mcg, Intramuscular, Q28 Days, Kajal Fairbanks DO, 1,000 mcg at 01/10/20 1242    cyanocobalamin injection 1,000 mcg, 1,000 mcg, Intramuscular, Q28 Days, Kajal Fairbanks DO, 1,000 mcg at 04/21/21 1152    cyanocobalamin injection 1,000 mcg, 1,000 mcg, Intramuscular, Q28 Days, Kajal Fairbanks DO, 1,000 mcg at 11/01/21 1120    Allergies:   No Known Allergies      Advanced Care Plan: N Advanced Care Planning was discussed. The patient does not have a living will documented in the medical record and  "declined to perform one today.  KPS/Quality of Life: 80 - Restricted Physical Activity  ECOG: (1) Restricted in physically strenuous activity, ambulatory and able to do work of light nature      Objective     Physical Exam:   Vital Signs:   Vitals:    12/17/24 1322   BP: 143/91   Pulse: 74   Resp: 20   Temp: 98.2 °F (36.8 °C)   TempSrc: Oral   SpO2: 96%   Weight: 92.1 kg (203 lb)   Height: 157.5 cm (62\")   PainSc: 10-Worst pain ever   PainLoc: Breast     Body mass index is 37.13 kg/m².     Constitutional: The patient is a well-developed, well-nourished female  in no acute distress.  Alert and oriented ×3.  General: NAD, sitting comfortably  Eye: EOMI, anicteric sclerae  HENT: NC/AT, MMM  Neck: No JVD or cervical lymphadenopathy  Respiratory: Symmetric expansion, nonlabored respiration  Cardiovascular: Regular rate and rhythm.  No murmurs, rubs, or gallops are appreciated.  Abdomen: nontender, nondistended.   Musculoskeletal: No obvious joint deformities, range of motion intact in all 4 extremities  Neuro: Alert oriented x3, cranial nerves III through XII are grossly intact, with no focal neurological deficits noted on exam.  Psych: Mood and affect appropriate      Assessment / Plan      Assessment/Plan:   Jessika Sousa is a pleasant 58 y.o. female with a right sided triple negative high grade DCIS with a microinvasive component managed with a right lumpectomy on 12/4/24. pT1mi  She understands that the role of radiation therapy after a lumpectomy is to decrease the risk of an ipsilateral breast tumor recurrence. She is interested in proceeding with treatment.   I anticipate treating her whole breast to a dose of 40 Gy/15 fractions.  We discussed anticipated cosmetic outcomes as well as acute and late toxicities associated with radiation therapy. For her case, radiation related toxicity would be secondary to effects of radiation on the skin, remaining breast tissue, ipsilateral lung, and heart. Acute toxicities " include fatigue, dermatitis, changes in skin pigmentation, and pneumonitis. Late toxicities include telangectasias, soft tissue fibrosis, pulmonary fibrosis, lymphedema, and an increased personal risk of cardiac events. She understands that while radiation is used as an important part of management with her existing diagnosis, there is a small possibility that she may develop a radiation-induced secondary malignancy in the treatment field in the future.  We discussed allowing ~4 weeks for wound healing prior to re-evaluation with a goal of starting treatment within 60 days of surgery. She will return to clinic in early Jan 2025.              Diagnoses and all orders for this visit:    1. 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      Time:   I spent 35 minutes on this encounter today, 12/18/24. Activities that took place during this time include: preparing to see the patient, obtaining history, reviewing separately obtained history, performing a medically appropriate examination and evaluation, counseling and educating the patient, ordering medications/tests/procedures, communicating with other healthcare providers, documenting clinical information in the health record, and coordinating care for this patient.     Sincerely,        Lupe Frey MD  Radiation Oncology  This document has been signed by Lupe Frey MD on December 18, 2024 10:05 EST     NOTICE TO PATIENTS:   At [facility], we believe that sharing information builds trust and better relationships. You are receiving this note because you recently visited [facility]. It is possible you will see health information before a provider has talked with you about it. This kind of information can be easy to misunderstand. To help you fully understand what it means for your health, we urge you to discuss this note with your provider.

## 2024-12-18 ENCOUNTER — HOSPITAL ENCOUNTER (OUTPATIENT)
Dept: RADIATION ONCOLOGY | Facility: HOSPITAL | Age: 59
Setting detail: RADIATION/ONCOLOGY SERIES
Discharge: HOME | End: 2024-12-18
Payer: COMMERCIAL

## 2024-12-18 ENCOUNTER — TELEPHONE (OUTPATIENT)
Age: 59
End: 2024-12-18
Payer: COMMERCIAL

## 2024-12-18 PROBLEM — C50.911 DUCTAL CARCINOMA IN SITU (DCIS) OF RIGHT BREAST WITH MICROINVASIVE COMPONENT: Status: ACTIVE | Noted: 2024-12-02

## 2024-12-18 NOTE — TELEPHONE ENCOUNTER
Patient returned call about ReEval scheduled for 12/30. Patient will be out of town this week and requests appt for week of 1/6/25. New appt scheduled for 1/6/25 @ [facility] @ 2:30pm. LVM with new appt. Requested patient return call to confirm appt.

## 2024-12-19 NOTE — PROGRESS NOTES
Please let the patient know her cholesterol is at 111, normal below 100. We can increase the lipitor to 80 mg or continue to monitor with diet and exercise changes. Let me know her preference.

## 2025-01-06 ENCOUNTER — APPOINTMENT (OUTPATIENT)
Dept: RADIATION ONCOLOGY | Facility: HOSPITAL | Age: 60
End: 2025-01-06
Payer: COMMERCIAL

## 2025-01-06 ENCOUNTER — TELEPHONE (OUTPATIENT)
Dept: RADIATION ONCOLOGY | Facility: HOSPITAL | Age: 60
End: 2025-01-06
Payer: COMMERCIAL

## 2025-01-06 ENCOUNTER — HOSPITAL ENCOUNTER (OUTPATIENT)
Dept: RADIATION ONCOLOGY | Facility: HOSPITAL | Age: 60
Setting detail: RADIATION/ONCOLOGY SERIES
Discharge: HOME | End: 2025-01-06
Payer: COMMERCIAL

## 2025-01-06 NOTE — TELEPHONE ENCOUNTER
Due to inclement weather, patient called to re-schedule Re-Evaluation with Dr. Frey for tomorrow, Tuesday, 1/7/25 at 11:30 am at [facility].  Patient wishes to be treated at [facility] and would like to keep all appointments there if possible.  Patient verbalized an understanding of date, time, and location of appointment.  Patient has contact information should she have questions or concerns.

## 2025-01-07 ENCOUNTER — HOSPITAL ENCOUNTER (OUTPATIENT)
Facility: HOSPITAL | Age: 60
Setting detail: RADIATION/ONCOLOGY SERIES
End: 2025-01-07
Payer: COMMERCIAL

## 2025-01-07 ENCOUNTER — OFFICE VISIT (OUTPATIENT)
Age: 60
End: 2025-01-07
Payer: COMMERCIAL

## 2025-01-07 VITALS
OXYGEN SATURATION: 95 % | HEART RATE: 76 BPM | DIASTOLIC BLOOD PRESSURE: 99 MMHG | BODY MASS INDEX: 38.09 KG/M2 | HEIGHT: 62 IN | RESPIRATION RATE: 16 BRPM | SYSTOLIC BLOOD PRESSURE: 160 MMHG | TEMPERATURE: 97.5 F | WEIGHT: 207 LBS

## 2025-01-07 DIAGNOSIS — C50.911 DUCTAL CARCINOMA IN SITU (DCIS) OF RIGHT BREAST WITH MICROINVASIVE COMPONENT: Primary | ICD-10-CM

## 2025-01-07 PROCEDURE — G0463 HOSPITAL OUTPT CLINIC VISIT: HCPCS

## 2025-01-07 NOTE — PROGRESS NOTES
Follow Up Office Visit      Encounter Date: 01/07/2025   Patient Name: Jessika Sousa  YOB: 1965   Medical Record Number: [id number]   Primary Diagnosis: No primary diagnosis found.   Cancer Staging: Cancer Staging   No matching staging information was found for the patient.                Cancer Staging   No matching staging information was found for the patient.          Chief Complaint:    Chief Complaint   Patient presents with    Breast Cancer     ReEval       Oncologic History: Jessika Sousa is a 59 y.o. female  regarding her screening detected DCIS with microinvasion of the right breast. Screening mammography identified an abnormality in the central portion of the right breast which was further characterized on diagnostic mammo/US.   Stereotactic core biopsy - high grade DCIS, ER-/DC-  Bilateral breast MRI - no evidence of multifocal, multicentric, regional, or contralateral disease  Right lumpectomy - high grade DCIS with focal microinvasion, extended margins taken and appropriately negative (greater than 5mm invasive carcinoma, greater than 10 mm DCIS), pT1mi, -/-/-     She is recovering well. She and her  are very active, they have retired.    Interval History: Denies wound discharge/drainage    Prior Radiation: none        Subjective      Review of Systems: Review of Systems   Constitutional:  Negative for activity change, chills, fatigue and fever.   Respiratory:  Negative for cough and shortness of breath.    Cardiovascular:  Negative for chest pain and leg swelling.   Gastrointestinal:  Negative for diarrhea, nausea and vomiting.   Endocrine: Negative.    Genitourinary: Negative.    Musculoskeletal:  Negative for arthralgias and back pain.   Skin:         Right breast incision; stabbing sensation intermittently. Bra rubs there.    Allergic/Immunologic: Negative.    Neurological: Negative.    Hematological: Negative.   "  Psychiatric/Behavioral:  Negative for sleep disturbance.        The following portions of the patient's history were reviewed and updated as appropriate: allergies, current medications, past family history, past medical history, past social history, past surgical history and problem list.    Measures:   KPS/Quality of Life: 80 - Restricted Physical Activity      Objective     Physical Exam:   Vital Signs:   Vitals:    01/07/25 1133   BP: 160/99   Pulse: 76   Resp: 16   Temp: 97.5 °F (36.4 °C)   TempSrc: Oral   SpO2: 95%   Weight: 93.9 kg (207 lb)   Height: 157.5 cm (62.01\")   PainSc:   1   PainLoc: Breast     Body mass index is 37.85 kg/m².     Constitutional: No acute distress, sitting comfortably  Eye: EOMI, anicteric sclerae  HENT: NC/AT, MMM   Respiratory: Symmetric expansion, nonlabored respiration  MSK: ROM intact in all four extremities, no obvious deformities  Neuro: Alert, oriented x3, CN3-12 grossly intact.   Psych: Appropriate mood and affect.  Right breast and axilla - incisions healed well          Assessment / Plan        Assessment/Plan:     There are no diagnoses linked to this encounter.    Jessika Sousa is a pleasant 59 y.o. female with a right sided triple negative high grade DCIS with a microinvasive component managed with a right lumpectomy on 12/4/24. pT1mi  She understands that the role of radiation therapy after a lumpectomy is to decrease the risk of an ipsilateral breast tumor recurrence. She is interested in proceeding with treatment.   I anticipate treating her whole breast to a dose of 40 Gy/15 fractions.Consent was obtained and she will be scheduled for CT simulation.      Follow Up:   No follow-ups on file.        Time:   I spent 35 minutes on this encounter today, 01/07/25. Activities that took place during this time include:   - preparing to see the patient  - obtaining and reviewing separately obtained history  - performing a medically appropriate examination and evaluation  - " counseling and educating the patient  - ordering medications/tests/procedures  - communicating with other healthcare providers  - documenting clinical information in the health record  - coordinating care for this patient.     Sincerely,        Lupe Frey MD  Radiation Oncology  This document has been signed by Lupe Frey MD on January 7, 2025 14:14 EST           NOTICE TO PATIENTS  At [facility], we believe that sharing information builds trust and better relationships. You are receiving this note because you recently visited [facility]. It is possible you will see health information before a provider has talked with you about it. This kind of information can be easy to misunderstand. To help you fully understand what it means for your health, we urge you to discuss this note with your provider.

## 2025-01-08 ENCOUNTER — HOSPITAL ENCOUNTER (OUTPATIENT)
Facility: HOSPITAL | Age: 60
Discharge: HOME | End: 2025-01-08

## 2025-01-08 PROCEDURE — 77333 RADIATION TREATMENT AID(S): CPT | Performed by: RADIOLOGY

## 2025-01-08 PROCEDURE — 77290 THER RAD SIMULAJ FIELD CPLX: CPT | Performed by: RADIOLOGY

## 2025-01-14 PROCEDURE — 77300 RADIATION THERAPY DOSE PLAN: CPT | Performed by: RADIOLOGY

## 2025-01-14 PROCEDURE — 77334 RADIATION TREATMENT AID(S): CPT | Performed by: RADIOLOGY

## 2025-01-14 PROCEDURE — 77295 3-D RADIOTHERAPY PLAN: CPT | Performed by: RADIOLOGY

## 2025-01-17 ENCOUNTER — HOSPITAL ENCOUNTER (OUTPATIENT)
Facility: HOSPITAL | Age: 60
Discharge: HOME | End: 2025-01-17

## 2025-01-17 PROCEDURE — 77280 THER RAD SIMULAJ FIELD SMPL: CPT | Performed by: RADIOLOGY

## 2025-01-20 ENCOUNTER — HOSPITAL ENCOUNTER (OUTPATIENT)
Facility: HOSPITAL | Age: 60
Discharge: HOME | End: 2025-01-20
Payer: COMMERCIAL

## 2025-01-20 LAB
RAD ONC ARIA COURSE ID: 1
RAD ONC ARIA COURSE INTENT: NORMAL
RAD ONC ARIA COURSE LAST TREATMENT DATE: NORMAL
RAD ONC ARIA COURSE START DATE: NORMAL
RAD ONC ARIA COURSE TREATMENT ELAPSED DAYS: 0
RAD ONC ARIA FIRST TREATMENT DATE: NORMAL
RAD ONC ARIA PLAN FRACTIONS TREATED TO DATE: 1
RAD ONC ARIA PLAN ID: NORMAL
RAD ONC ARIA PLAN NAME: NORMAL
RAD ONC ARIA PLAN PRESCRIBED DOSE PER FRACTION: 2.67 GY
RAD ONC ARIA PLAN PRIMARY REFERENCE POINT: NORMAL
RAD ONC ARIA PLAN TOTAL FRACTIONS PRESCRIBED: 15
RAD ONC ARIA PLAN TOTAL PRESCRIBED DOSE: 4005 CGY
RAD ONC ARIA REFERENCE POINT DOSAGE GIVEN TO DATE: 2.67 GY
RAD ONC ARIA REFERENCE POINT ID: NORMAL
RAD ONC ARIA REFERENCE POINT SESSION DOSAGE GIVEN: 2.67 GY

## 2025-01-20 PROCEDURE — 77412 RADIATION TX DELIVERY LVL 3: CPT | Performed by: RADIOLOGY

## 2025-01-20 PROCEDURE — 77387 GUIDANCE FOR RADJ TX DLVR: CPT | Performed by: RADIOLOGY

## 2025-01-21 ENCOUNTER — HOSPITAL ENCOUNTER (OUTPATIENT)
Facility: HOSPITAL | Age: 60
Discharge: HOME | End: 2025-01-21

## 2025-01-21 VITALS — WEIGHT: 204 LBS | BODY MASS INDEX: 37.3 KG/M2

## 2025-01-21 LAB
RAD ONC ARIA COURSE ID: 1
RAD ONC ARIA COURSE INTENT: NORMAL
RAD ONC ARIA COURSE LAST TREATMENT DATE: NORMAL
RAD ONC ARIA COURSE START DATE: NORMAL
RAD ONC ARIA COURSE TREATMENT ELAPSED DAYS: 1
RAD ONC ARIA FIRST TREATMENT DATE: NORMAL
RAD ONC ARIA PLAN FRACTIONS TREATED TO DATE: 2
RAD ONC ARIA PLAN ID: NORMAL
RAD ONC ARIA PLAN NAME: NORMAL
RAD ONC ARIA PLAN PRESCRIBED DOSE PER FRACTION: 2.67 GY
RAD ONC ARIA PLAN PRIMARY REFERENCE POINT: NORMAL
RAD ONC ARIA PLAN TOTAL FRACTIONS PRESCRIBED: 15
RAD ONC ARIA PLAN TOTAL PRESCRIBED DOSE: 4005 CGY
RAD ONC ARIA REFERENCE POINT DOSAGE GIVEN TO DATE: 5.34 GY
RAD ONC ARIA REFERENCE POINT ID: NORMAL
RAD ONC ARIA REFERENCE POINT SESSION DOSAGE GIVEN: 2.67 GY

## 2025-01-21 PROCEDURE — 77387 GUIDANCE FOR RADJ TX DLVR: CPT | Performed by: RADIOLOGY

## 2025-01-21 PROCEDURE — 77412 RADIATION TX DELIVERY LVL 3: CPT | Performed by: RADIOLOGY

## 2025-01-22 ENCOUNTER — HOSPITAL ENCOUNTER (OUTPATIENT)
Facility: HOSPITAL | Age: 60
Discharge: HOME | End: 2025-01-22

## 2025-01-22 LAB
RAD ONC ARIA COURSE ID: 1
RAD ONC ARIA COURSE INTENT: NORMAL
RAD ONC ARIA COURSE LAST TREATMENT DATE: NORMAL
RAD ONC ARIA COURSE START DATE: NORMAL
RAD ONC ARIA COURSE TREATMENT ELAPSED DAYS: 2
RAD ONC ARIA FIRST TREATMENT DATE: NORMAL
RAD ONC ARIA PLAN FRACTIONS TREATED TO DATE: 3
RAD ONC ARIA PLAN ID: NORMAL
RAD ONC ARIA PLAN NAME: NORMAL
RAD ONC ARIA PLAN PRESCRIBED DOSE PER FRACTION: 2.67 GY
RAD ONC ARIA PLAN PRIMARY REFERENCE POINT: NORMAL
RAD ONC ARIA PLAN TOTAL FRACTIONS PRESCRIBED: 15
RAD ONC ARIA PLAN TOTAL PRESCRIBED DOSE: 4005 CGY
RAD ONC ARIA REFERENCE POINT DOSAGE GIVEN TO DATE: 8.01 GY
RAD ONC ARIA REFERENCE POINT ID: NORMAL
RAD ONC ARIA REFERENCE POINT SESSION DOSAGE GIVEN: 2.67 GY

## 2025-01-22 PROCEDURE — 77412 RADIATION TX DELIVERY LVL 3: CPT | Performed by: RADIOLOGY

## 2025-01-22 PROCEDURE — 77387 GUIDANCE FOR RADJ TX DLVR: CPT | Performed by: RADIOLOGY

## 2025-01-23 ENCOUNTER — HOSPITAL ENCOUNTER (OUTPATIENT)
Facility: HOSPITAL | Age: 60
Discharge: HOME | End: 2025-01-23

## 2025-01-23 LAB
RAD ONC ARIA COURSE ID: 1
RAD ONC ARIA COURSE INTENT: NORMAL
RAD ONC ARIA COURSE LAST TREATMENT DATE: NORMAL
RAD ONC ARIA COURSE START DATE: NORMAL
RAD ONC ARIA COURSE TREATMENT ELAPSED DAYS: 3
RAD ONC ARIA FIRST TREATMENT DATE: NORMAL
RAD ONC ARIA PLAN FRACTIONS TREATED TO DATE: 4
RAD ONC ARIA PLAN ID: NORMAL
RAD ONC ARIA PLAN NAME: NORMAL
RAD ONC ARIA PLAN PRESCRIBED DOSE PER FRACTION: 2.67 GY
RAD ONC ARIA PLAN PRIMARY REFERENCE POINT: NORMAL
RAD ONC ARIA PLAN TOTAL FRACTIONS PRESCRIBED: 15
RAD ONC ARIA PLAN TOTAL PRESCRIBED DOSE: 4005 CGY
RAD ONC ARIA REFERENCE POINT DOSAGE GIVEN TO DATE: 10.68 GY
RAD ONC ARIA REFERENCE POINT ID: NORMAL
RAD ONC ARIA REFERENCE POINT SESSION DOSAGE GIVEN: 2.67 GY

## 2025-01-23 PROCEDURE — 77412 RADIATION TX DELIVERY LVL 3: CPT | Performed by: RADIOLOGY

## 2025-01-23 PROCEDURE — 77387 GUIDANCE FOR RADJ TX DLVR: CPT | Performed by: RADIOLOGY

## 2025-01-24 ENCOUNTER — HOSPITAL ENCOUNTER (OUTPATIENT)
Facility: HOSPITAL | Age: 60
Discharge: HOME | End: 2025-01-24

## 2025-01-24 LAB
RAD ONC ARIA COURSE ID: 1
RAD ONC ARIA COURSE INTENT: NORMAL
RAD ONC ARIA COURSE LAST TREATMENT DATE: NORMAL
RAD ONC ARIA COURSE START DATE: NORMAL
RAD ONC ARIA COURSE TREATMENT ELAPSED DAYS: 4
RAD ONC ARIA FIRST TREATMENT DATE: NORMAL
RAD ONC ARIA PLAN FRACTIONS TREATED TO DATE: 5
RAD ONC ARIA PLAN ID: NORMAL
RAD ONC ARIA PLAN NAME: NORMAL
RAD ONC ARIA PLAN PRESCRIBED DOSE PER FRACTION: 2.67 GY
RAD ONC ARIA PLAN PRIMARY REFERENCE POINT: NORMAL
RAD ONC ARIA PLAN TOTAL FRACTIONS PRESCRIBED: 15
RAD ONC ARIA PLAN TOTAL PRESCRIBED DOSE: 4005 CGY
RAD ONC ARIA REFERENCE POINT DOSAGE GIVEN TO DATE: 13.35 GY
RAD ONC ARIA REFERENCE POINT ID: NORMAL
RAD ONC ARIA REFERENCE POINT SESSION DOSAGE GIVEN: 2.67 GY

## 2025-01-24 PROCEDURE — 77387 GUIDANCE FOR RADJ TX DLVR: CPT | Performed by: RADIOLOGY

## 2025-01-24 PROCEDURE — 77336 RADIATION PHYSICS CONSULT: CPT | Performed by: RADIOLOGY

## 2025-01-24 PROCEDURE — 77412 RADIATION TX DELIVERY LVL 3: CPT | Performed by: RADIOLOGY

## 2025-01-27 ENCOUNTER — HOSPITAL ENCOUNTER (OUTPATIENT)
Facility: HOSPITAL | Age: 60
Discharge: HOME | End: 2025-01-27
Payer: COMMERCIAL

## 2025-01-27 LAB
RAD ONC ARIA COURSE ID: 1
RAD ONC ARIA COURSE INTENT: NORMAL
RAD ONC ARIA COURSE LAST TREATMENT DATE: NORMAL
RAD ONC ARIA COURSE START DATE: NORMAL
RAD ONC ARIA COURSE TREATMENT ELAPSED DAYS: 7
RAD ONC ARIA FIRST TREATMENT DATE: NORMAL
RAD ONC ARIA PLAN FRACTIONS TREATED TO DATE: 6
RAD ONC ARIA PLAN ID: NORMAL
RAD ONC ARIA PLAN NAME: NORMAL
RAD ONC ARIA PLAN PRESCRIBED DOSE PER FRACTION: 2.67 GY
RAD ONC ARIA PLAN PRIMARY REFERENCE POINT: NORMAL
RAD ONC ARIA PLAN TOTAL FRACTIONS PRESCRIBED: 15
RAD ONC ARIA PLAN TOTAL PRESCRIBED DOSE: 4005 CGY
RAD ONC ARIA REFERENCE POINT DOSAGE GIVEN TO DATE: 16.02 GY
RAD ONC ARIA REFERENCE POINT ID: NORMAL
RAD ONC ARIA REFERENCE POINT SESSION DOSAGE GIVEN: 2.67 GY

## 2025-01-27 PROCEDURE — 77412 RADIATION TX DELIVERY LVL 3: CPT | Performed by: RADIOLOGY

## 2025-01-27 PROCEDURE — 77387 GUIDANCE FOR RADJ TX DLVR: CPT | Performed by: RADIOLOGY

## 2025-01-28 ENCOUNTER — DOCUMENTATION (OUTPATIENT)
Dept: NUTRITION | Facility: HOSPITAL | Age: 60
End: 2025-01-28
Payer: COMMERCIAL

## 2025-01-28 ENCOUNTER — HOSPITAL ENCOUNTER (OUTPATIENT)
Facility: HOSPITAL | Age: 60
Discharge: HOME | End: 2025-01-28

## 2025-01-28 VITALS — WEIGHT: 204 LBS | BODY MASS INDEX: 37.3 KG/M2

## 2025-01-28 LAB
RAD ONC ARIA COURSE ID: 1
RAD ONC ARIA COURSE INTENT: NORMAL
RAD ONC ARIA COURSE LAST TREATMENT DATE: NORMAL
RAD ONC ARIA COURSE START DATE: NORMAL
RAD ONC ARIA COURSE TREATMENT ELAPSED DAYS: 8
RAD ONC ARIA FIRST TREATMENT DATE: NORMAL
RAD ONC ARIA PLAN FRACTIONS TREATED TO DATE: 7
RAD ONC ARIA PLAN ID: NORMAL
RAD ONC ARIA PLAN NAME: NORMAL
RAD ONC ARIA PLAN PRESCRIBED DOSE PER FRACTION: 2.67 GY
RAD ONC ARIA PLAN PRIMARY REFERENCE POINT: NORMAL
RAD ONC ARIA PLAN TOTAL FRACTIONS PRESCRIBED: 15
RAD ONC ARIA PLAN TOTAL PRESCRIBED DOSE: 4005 CGY
RAD ONC ARIA REFERENCE POINT DOSAGE GIVEN TO DATE: 18.69 GY
RAD ONC ARIA REFERENCE POINT ID: NORMAL
RAD ONC ARIA REFERENCE POINT SESSION DOSAGE GIVEN: 2.67 GY

## 2025-01-28 PROCEDURE — 77412 RADIATION TX DELIVERY LVL 3: CPT | Performed by: RADIOLOGY

## 2025-01-28 PROCEDURE — 77387 GUIDANCE FOR RADJ TX DLVR: CPT | Performed by: RADIOLOGY

## 2025-01-28 NOTE — PROGRESS NOTES
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     Surgery: right lumpectomy on 24     Radiation: whole breast to a dose of 40 Gy/15 fractions     Patient Active Problem List:    Patient Active Problem List   Diagnosis    Iron deficiency anemia    Irritable bowel syndrome with diarrhea    Annual physical exam    Other hyperlipidemia    Gastroesophageal reflux disease without esophagitis    Elevated blood-pressure reading without diagnosis of hypertension    Hemorrhage of rectum and anus    Mixed stress and urge urinary incontinence    Pain in right foot    Residual hemorrhoidal skin tags    Vulvovaginitis    Pain of right thumb    Anxiety    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    Goal: 100+ ounces    Enteral Feeding       Anthropometric Measurements     Height:    Ht Readings from Last 1 Encounters:   25 157.5 cm (62.01\")       Weight:    Wt Readings from Last 1 Encounters:   25 92.5 kg (204 lb)       BMI:  37.30 Obese    Weight Change:  Stable since Oct 2024, ~204#  25- 204#    Review of Lab Data (Time Frame - 1 month / 2 month)   Reviewed 25    Medication Review   Reviewed MAR 1/28/25; protonix    Nutrition Focused Physical Findings       Nutrition Impact Symptoms   No problems with eating      Physical Activity      Not my normal self, but able to be up and about with fairly normal activities    Current Nutritional Intake     Oral diet: Regular    Oral nutritional " supplements: none indicated    Intake: oral intake has been normal     Malnutrition Risk Assessment     Recent weight loss over the past 6 months:  0 = No    Eating poorly because of a decreased appetite:  0 = No    Malnutrition Screening Score:     MST = 0 or 1 Patient not at risk for malnutrition    Nutrition Diagnosis     Problem    Etiology    Signs / Symptoms      Nutrition Intervention   Reviewed the importance of good nutrition during her treatment course focusing on adequate calorie, protein, nutrient and fluid intake. Advised her to be consuming smaller more frequent meals/snacks throughout the day to aid with potential nausea management. Emphasized the importance of protein and its role in the diet; reviewed high protein foods; and recommended she have a protein source at each meal/snack. Discussed ONS's as needed to aid with calorie / protein intake.     Patient also asked questions regarding specific types of food- farm raised vs wild caught salmon, no fat vs whole fat dairy products, etc.     Patient education material provided last week-  Nutrition and Lifestyle Recommendations  Nutritional Considerations in Breast Cancer     Goal   To achieve adequate nutritional and hydration intake.    To aid with nutrition impact symptom management as needed.    Monitoring / Evaluation   Answered their questions and both voiced understanding of information discussed. RD's contact information provided and encouraged to call with questions. Will follow up as indicated.    Floridalma Snyder, MICHELLE, LD

## 2025-01-29 ENCOUNTER — HOSPITAL ENCOUNTER (OUTPATIENT)
Facility: HOSPITAL | Age: 60
Discharge: HOME | End: 2025-01-29

## 2025-01-29 LAB
RAD ONC ARIA COURSE ID: 1
RAD ONC ARIA COURSE INTENT: NORMAL
RAD ONC ARIA COURSE LAST TREATMENT DATE: NORMAL
RAD ONC ARIA COURSE START DATE: NORMAL
RAD ONC ARIA COURSE TREATMENT ELAPSED DAYS: 9
RAD ONC ARIA FIRST TREATMENT DATE: NORMAL
RAD ONC ARIA PLAN FRACTIONS TREATED TO DATE: 8
RAD ONC ARIA PLAN ID: NORMAL
RAD ONC ARIA PLAN NAME: NORMAL
RAD ONC ARIA PLAN PRESCRIBED DOSE PER FRACTION: 2.67 GY
RAD ONC ARIA PLAN PRIMARY REFERENCE POINT: NORMAL
RAD ONC ARIA PLAN TOTAL FRACTIONS PRESCRIBED: 15
RAD ONC ARIA PLAN TOTAL PRESCRIBED DOSE: 4005 CGY
RAD ONC ARIA REFERENCE POINT DOSAGE GIVEN TO DATE: 21.36 GY
RAD ONC ARIA REFERENCE POINT ID: NORMAL
RAD ONC ARIA REFERENCE POINT SESSION DOSAGE GIVEN: 2.67 GY

## 2025-01-29 PROCEDURE — 77387 GUIDANCE FOR RADJ TX DLVR: CPT | Performed by: RADIOLOGY

## 2025-01-29 PROCEDURE — 77412 RADIATION TX DELIVERY LVL 3: CPT | Performed by: RADIOLOGY

## 2025-01-30 ENCOUNTER — HOSPITAL ENCOUNTER (OUTPATIENT)
Facility: HOSPITAL | Age: 60
Discharge: HOME | End: 2025-01-30

## 2025-01-30 LAB
RAD ONC ARIA COURSE ID: 1
RAD ONC ARIA COURSE INTENT: NORMAL
RAD ONC ARIA COURSE LAST TREATMENT DATE: NORMAL
RAD ONC ARIA COURSE START DATE: NORMAL
RAD ONC ARIA COURSE TREATMENT ELAPSED DAYS: 10
RAD ONC ARIA FIRST TREATMENT DATE: NORMAL
RAD ONC ARIA PLAN FRACTIONS TREATED TO DATE: 9
RAD ONC ARIA PLAN ID: NORMAL
RAD ONC ARIA PLAN NAME: NORMAL
RAD ONC ARIA PLAN PRESCRIBED DOSE PER FRACTION: 2.67 GY
RAD ONC ARIA PLAN PRIMARY REFERENCE POINT: NORMAL
RAD ONC ARIA PLAN TOTAL FRACTIONS PRESCRIBED: 15
RAD ONC ARIA PLAN TOTAL PRESCRIBED DOSE: 4005 CGY
RAD ONC ARIA REFERENCE POINT DOSAGE GIVEN TO DATE: 24.03 GY
RAD ONC ARIA REFERENCE POINT ID: NORMAL
RAD ONC ARIA REFERENCE POINT SESSION DOSAGE GIVEN: 2.67 GY

## 2025-01-30 PROCEDURE — 77412 RADIATION TX DELIVERY LVL 3: CPT | Performed by: RADIOLOGY

## 2025-01-30 PROCEDURE — 77387 GUIDANCE FOR RADJ TX DLVR: CPT | Performed by: RADIOLOGY

## 2025-01-31 ENCOUNTER — HOSPITAL ENCOUNTER (OUTPATIENT)
Facility: HOSPITAL | Age: 60
Discharge: HOME | End: 2025-01-31

## 2025-01-31 LAB
RAD ONC ARIA COURSE ID: 1
RAD ONC ARIA COURSE INTENT: NORMAL
RAD ONC ARIA COURSE LAST TREATMENT DATE: NORMAL
RAD ONC ARIA COURSE START DATE: NORMAL
RAD ONC ARIA COURSE TREATMENT ELAPSED DAYS: 11
RAD ONC ARIA FIRST TREATMENT DATE: NORMAL
RAD ONC ARIA PLAN FRACTIONS TREATED TO DATE: 10
RAD ONC ARIA PLAN ID: NORMAL
RAD ONC ARIA PLAN NAME: NORMAL
RAD ONC ARIA PLAN PRESCRIBED DOSE PER FRACTION: 2.67 GY
RAD ONC ARIA PLAN PRIMARY REFERENCE POINT: NORMAL
RAD ONC ARIA PLAN TOTAL FRACTIONS PRESCRIBED: 15
RAD ONC ARIA PLAN TOTAL PRESCRIBED DOSE: 4005 CGY
RAD ONC ARIA REFERENCE POINT DOSAGE GIVEN TO DATE: 26.7 GY
RAD ONC ARIA REFERENCE POINT ID: NORMAL
RAD ONC ARIA REFERENCE POINT SESSION DOSAGE GIVEN: 2.67 GY

## 2025-01-31 PROCEDURE — 77336 RADIATION PHYSICS CONSULT: CPT | Performed by: RADIOLOGY

## 2025-01-31 PROCEDURE — 77412 RADIATION TX DELIVERY LVL 3: CPT | Performed by: RADIOLOGY

## 2025-01-31 PROCEDURE — 77387 GUIDANCE FOR RADJ TX DLVR: CPT | Performed by: RADIOLOGY

## 2025-02-03 ENCOUNTER — HOSPITAL ENCOUNTER (OUTPATIENT)
Facility: HOSPITAL | Age: 60
Discharge: HOME | End: 2025-02-03
Payer: COMMERCIAL

## 2025-02-03 ENCOUNTER — OFFICE VISIT (OUTPATIENT)
Dept: FAMILY MEDICINE CLINIC | Facility: CLINIC | Age: 60
End: 2025-02-03
Payer: COMMERCIAL

## 2025-02-03 ENCOUNTER — HOSPITAL ENCOUNTER (OUTPATIENT)
Facility: HOSPITAL | Age: 60
Setting detail: RADIATION/ONCOLOGY SERIES
End: 2025-02-03
Payer: COMMERCIAL

## 2025-02-03 VITALS
DIASTOLIC BLOOD PRESSURE: 82 MMHG | TEMPERATURE: 98.4 F | BODY MASS INDEX: 37.36 KG/M2 | HEIGHT: 62 IN | HEART RATE: 80 BPM | SYSTOLIC BLOOD PRESSURE: 118 MMHG | WEIGHT: 203 LBS | OXYGEN SATURATION: 97 %

## 2025-02-03 DIAGNOSIS — E78.5 HYPERLIPIDEMIA, UNSPECIFIED HYPERLIPIDEMIA TYPE: Primary | ICD-10-CM

## 2025-02-03 DIAGNOSIS — F33.0 MILD EPISODE OF RECURRENT DEPRESSIVE DISORDER: Chronic | ICD-10-CM

## 2025-02-03 LAB
RAD ONC ARIA COURSE ID: 1
RAD ONC ARIA COURSE INTENT: NORMAL
RAD ONC ARIA COURSE LAST TREATMENT DATE: NORMAL
RAD ONC ARIA COURSE START DATE: NORMAL
RAD ONC ARIA COURSE TREATMENT ELAPSED DAYS: 14
RAD ONC ARIA FIRST TREATMENT DATE: NORMAL
RAD ONC ARIA PLAN FRACTIONS TREATED TO DATE: 11
RAD ONC ARIA PLAN ID: NORMAL
RAD ONC ARIA PLAN NAME: NORMAL
RAD ONC ARIA PLAN PRESCRIBED DOSE PER FRACTION: 2.67 GY
RAD ONC ARIA PLAN PRIMARY REFERENCE POINT: NORMAL
RAD ONC ARIA PLAN TOTAL FRACTIONS PRESCRIBED: 15
RAD ONC ARIA PLAN TOTAL PRESCRIBED DOSE: 4005 CGY
RAD ONC ARIA REFERENCE POINT DOSAGE GIVEN TO DATE: 29.37 GY
RAD ONC ARIA REFERENCE POINT ID: NORMAL
RAD ONC ARIA REFERENCE POINT SESSION DOSAGE GIVEN: 2.67 GY

## 2025-02-03 PROCEDURE — 77412 RADIATION TX DELIVERY LVL 3: CPT | Performed by: RADIOLOGY

## 2025-02-03 PROCEDURE — 99214 OFFICE O/P EST MOD 30 MIN: CPT | Performed by: STUDENT IN AN ORGANIZED HEALTH CARE EDUCATION/TRAINING PROGRAM

## 2025-02-03 PROCEDURE — 77387 GUIDANCE FOR RADJ TX DLVR: CPT | Performed by: RADIOLOGY

## 2025-02-03 RX ORDER — AZELASTINE 1 MG/ML
2 SPRAY, METERED NASAL 2 TIMES DAILY
Qty: 1 EACH | Refills: 1 | Status: SHIPPED | OUTPATIENT
Start: 2025-02-03

## 2025-02-03 RX ORDER — ATORVASTATIN CALCIUM 40 MG/1
40 TABLET, FILM COATED ORAL DAILY
Qty: 90 TABLET | Refills: 3 | Status: SHIPPED | OUTPATIENT
Start: 2025-02-03

## 2025-02-03 RX ORDER — CITALOPRAM HYDROBROMIDE 20 MG/1
20 TABLET ORAL DAILY
Qty: 90 TABLET | Refills: 3 | Status: SHIPPED | OUTPATIENT
Start: 2025-02-03

## 2025-02-03 NOTE — PROGRESS NOTES
"Chief Complaint  Hyperlipidemia (fu)    History of Present Illness      Hld  She wants to stay on the lower dose.       Breast cancer  Tolerated the radiation.     She has been noticing irritation from the flonase.     No other complaints today.     The following portions of the patient's history were reviewed and updated as appropriate: allergies, current medications, past family history, past medical history, past social history, past surgical history, and problem list.    OBJECTIVE:  /82   Pulse 80   Temp 98.4 °F (36.9 °C) (Temporal)   Ht 157.5 cm (62.01\")   Wt 92.1 kg (203 lb)   SpO2 97%   BMI 37.12 kg/m²       Physical Exam  Constitutional:       General: She is not in acute distress.     Appearance: Normal appearance.   HENT:      Head: Normocephalic and atraumatic.   Eyes:      Extraocular Movements: Extraocular movements intact.   Cardiovascular:      Rate and Rhythm: Normal rate and regular rhythm.      Heart sounds: No murmur heard.  Pulmonary:      Effort: Pulmonary effort is normal. No respiratory distress.      Breath sounds: Normal breath sounds. No stridor. No wheezing, rhonchi or rales.   Skin:     Findings: No rash.   Neurological:      General: No focal deficit present.      Mental Status: She is alert.   Psychiatric:         Mood and Affect: Mood normal.                  Assessment and Plan   Diagnoses and all orders for this visit:    1. Hyperlipidemia, unspecified hyperlipidemia type (Primary)  -     Lipid Panel; Future  -     Comprehensive metabolic panel; Future    2. Mild episode of recurrent depressive disorder  -     citalopram (CeleXA) 20 MG tablet; Take 1 tablet by mouth Daily.  Dispense: 90 tablet; Refill: 3    Other orders  -     atorvastatin (LIPITOR) 40 MG tablet; Take 1 tablet by mouth Daily.  Dispense: 90 tablet; Refill: 3  -     azelastine (ASTELIN) 0.1 % nasal spray; Administer 2 sprays into the nostril(s) as directed by provider 2 (Two) Times a Day. Use in each " nostril as directed  Dispense: 1 each; Refill: 1      Continue meds.  Switch to astelin.         No follow-ups on file.       Shirin Dave D.O.  [facility]

## 2025-02-04 ENCOUNTER — DOCUMENTATION (OUTPATIENT)
Dept: NUTRITION | Facility: HOSPITAL | Age: 60
End: 2025-02-04
Payer: COMMERCIAL

## 2025-02-04 ENCOUNTER — HOSPITAL ENCOUNTER (OUTPATIENT)
Facility: HOSPITAL | Age: 60
Discharge: HOME | End: 2025-02-04

## 2025-02-04 ENCOUNTER — LAB (OUTPATIENT)
Dept: LAB | Facility: HOSPITAL | Age: 60
End: 2025-02-04
Payer: COMMERCIAL

## 2025-02-04 VITALS — WEIGHT: 203 LBS | BODY MASS INDEX: 37.12 KG/M2

## 2025-02-04 DIAGNOSIS — E78.5 HYPERLIPIDEMIA, UNSPECIFIED HYPERLIPIDEMIA TYPE: ICD-10-CM

## 2025-02-04 LAB
ALBUMIN SERPL-MCNC: 4.3 G/DL (ref 3.5–5.2)
ALBUMIN/GLOB SERPL: 1.4 G/DL
ALP SERPL-CCNC: 64 U/L (ref 39–117)
ALT SERPL W P-5'-P-CCNC: 33 U/L (ref 1–33)
ANION GAP SERPL CALCULATED.3IONS-SCNC: 12 MMOL/L (ref 5–15)
AST SERPL-CCNC: 31 U/L (ref 1–32)
BILIRUB SERPL-MCNC: 0.5 MG/DL (ref 0–1.2)
BUN SERPL-MCNC: 12 MG/DL (ref 6–20)
BUN/CREAT SERPL: 15.4 (ref 7–25)
CALCIUM SPEC-SCNC: 9.4 MG/DL (ref 8.6–10.5)
CHLORIDE SERPL-SCNC: 106 MMOL/L (ref 98–107)
CHOLEST SERPL-MCNC: 169 MG/DL (ref 0–200)
CO2 SERPL-SCNC: 22 MMOL/L (ref 22–29)
CREAT SERPL-MCNC: 0.78 MG/DL (ref 0.57–1)
EGFRCR SERPLBLD CKD-EPI 2021: 87.6 ML/MIN/1.73
GLOBULIN UR ELPH-MCNC: 3 GM/DL
GLUCOSE SERPL-MCNC: 93 MG/DL (ref 65–99)
HDLC SERPL-MCNC: 52 MG/DL (ref 40–60)
LDLC SERPL CALC-MCNC: 98 MG/DL (ref 0–100)
LDLC/HDLC SERPL: 1.84 {RATIO}
POTASSIUM SERPL-SCNC: 4.4 MMOL/L (ref 3.5–5.2)
PROT SERPL-MCNC: 7.3 G/DL (ref 6–8.5)
RAD ONC ARIA COURSE ID: 1
RAD ONC ARIA COURSE INTENT: NORMAL
RAD ONC ARIA COURSE LAST TREATMENT DATE: NORMAL
RAD ONC ARIA COURSE START DATE: NORMAL
RAD ONC ARIA COURSE TREATMENT ELAPSED DAYS: 15
RAD ONC ARIA FIRST TREATMENT DATE: NORMAL
RAD ONC ARIA PLAN FRACTIONS TREATED TO DATE: 12
RAD ONC ARIA PLAN ID: NORMAL
RAD ONC ARIA PLAN NAME: NORMAL
RAD ONC ARIA PLAN PRESCRIBED DOSE PER FRACTION: 2.67 GY
RAD ONC ARIA PLAN PRIMARY REFERENCE POINT: NORMAL
RAD ONC ARIA PLAN TOTAL FRACTIONS PRESCRIBED: 15
RAD ONC ARIA PLAN TOTAL PRESCRIBED DOSE: 4005 CGY
RAD ONC ARIA REFERENCE POINT DOSAGE GIVEN TO DATE: 32.04 GY
RAD ONC ARIA REFERENCE POINT ID: NORMAL
RAD ONC ARIA REFERENCE POINT SESSION DOSAGE GIVEN: 2.67 GY
SODIUM SERPL-SCNC: 140 MMOL/L (ref 136–145)
TRIGL SERPL-MCNC: 107 MG/DL (ref 0–150)
VLDLC SERPL-MCNC: 19 MG/DL (ref 5–40)

## 2025-02-04 PROCEDURE — 36415 COLL VENOUS BLD VENIPUNCTURE: CPT

## 2025-02-04 PROCEDURE — 80053 COMPREHEN METABOLIC PANEL: CPT

## 2025-02-04 PROCEDURE — 77387 GUIDANCE FOR RADJ TX DLVR: CPT | Performed by: RADIOLOGY

## 2025-02-04 PROCEDURE — 80061 LIPID PANEL: CPT

## 2025-02-04 PROCEDURE — 77412 RADIATION TX DELIVERY LVL 3: CPT | Performed by: RADIOLOGY

## 2025-02-04 NOTE — PROGRESS NOTES
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      Patient reports the past week has gone fairly well. Patient has noticed increased fatigue towards the end of the week but no other nutrition impact symptoms at this time. Patient reports she's doing well with her small, frequent meals, protein and hydrating fluids.     All questions/concerns addressed at this time.     Will follow as indicated.     Floridalma Snyder, MICHELLE, LD

## 2025-02-05 ENCOUNTER — TELEPHONE (OUTPATIENT)
Dept: FAMILY MEDICINE CLINIC | Facility: CLINIC | Age: 60
End: 2025-02-05
Payer: COMMERCIAL

## 2025-02-05 ENCOUNTER — HOSPITAL ENCOUNTER (OUTPATIENT)
Facility: HOSPITAL | Age: 60
Discharge: HOME | End: 2025-02-05

## 2025-02-05 LAB
RAD ONC ARIA COURSE ID: 1
RAD ONC ARIA COURSE INTENT: NORMAL
RAD ONC ARIA COURSE LAST TREATMENT DATE: NORMAL
RAD ONC ARIA COURSE START DATE: NORMAL
RAD ONC ARIA COURSE TREATMENT ELAPSED DAYS: 16
RAD ONC ARIA FIRST TREATMENT DATE: NORMAL
RAD ONC ARIA PLAN FRACTIONS TREATED TO DATE: 13
RAD ONC ARIA PLAN ID: NORMAL
RAD ONC ARIA PLAN NAME: NORMAL
RAD ONC ARIA PLAN PRESCRIBED DOSE PER FRACTION: 2.67 GY
RAD ONC ARIA PLAN PRIMARY REFERENCE POINT: NORMAL
RAD ONC ARIA PLAN TOTAL FRACTIONS PRESCRIBED: 15
RAD ONC ARIA PLAN TOTAL PRESCRIBED DOSE: 4005 CGY
RAD ONC ARIA REFERENCE POINT DOSAGE GIVEN TO DATE: 34.71 GY
RAD ONC ARIA REFERENCE POINT ID: NORMAL
RAD ONC ARIA REFERENCE POINT SESSION DOSAGE GIVEN: 2.67 GY

## 2025-02-05 PROCEDURE — 77387 GUIDANCE FOR RADJ TX DLVR: CPT | Performed by: RADIOLOGY

## 2025-02-05 PROCEDURE — 77412 RADIATION TX DELIVERY LVL 3: CPT | Performed by: RADIOLOGY

## 2025-02-05 NOTE — TELEPHONE ENCOUNTER
"Okay for the HUB to relay:      Please let the patient know her cholesterol is now in the normal range.           Name: Jessika Sousa \"Asmita\"    Relationship: Self    Best Callback Number: [phone]     HUB PROVIDED THE RELAY MESSAGE FROM THE OFFICE   PATIENT VOICED UNDERSTANDING AND HAS NO FURTHER QUESTIONS AT THIS TIME    ADDITIONAL INFORMATION:    "

## 2025-02-05 NOTE — TELEPHONE ENCOUNTER
Okay for the HUB to relay:     Please let the patient know her cholesterol is now in the normal range.

## 2025-02-06 ENCOUNTER — HOSPITAL ENCOUNTER (OUTPATIENT)
Facility: HOSPITAL | Age: 60
Discharge: HOME | End: 2025-02-06

## 2025-02-06 LAB
RAD ONC ARIA COURSE ID: 1
RAD ONC ARIA COURSE INTENT: NORMAL
RAD ONC ARIA COURSE LAST TREATMENT DATE: NORMAL
RAD ONC ARIA COURSE START DATE: NORMAL
RAD ONC ARIA COURSE TREATMENT ELAPSED DAYS: 17
RAD ONC ARIA FIRST TREATMENT DATE: NORMAL
RAD ONC ARIA PLAN FRACTIONS TREATED TO DATE: 14
RAD ONC ARIA PLAN ID: NORMAL
RAD ONC ARIA PLAN NAME: NORMAL
RAD ONC ARIA PLAN PRESCRIBED DOSE PER FRACTION: 2.67 GY
RAD ONC ARIA PLAN PRIMARY REFERENCE POINT: NORMAL
RAD ONC ARIA PLAN TOTAL FRACTIONS PRESCRIBED: 15
RAD ONC ARIA PLAN TOTAL PRESCRIBED DOSE: 4005 CGY
RAD ONC ARIA REFERENCE POINT DOSAGE GIVEN TO DATE: 37.38 GY
RAD ONC ARIA REFERENCE POINT ID: NORMAL
RAD ONC ARIA REFERENCE POINT SESSION DOSAGE GIVEN: 2.67 GY

## 2025-02-06 PROCEDURE — 77412 RADIATION TX DELIVERY LVL 3: CPT | Performed by: RADIOLOGY

## 2025-02-06 PROCEDURE — 77387 GUIDANCE FOR RADJ TX DLVR: CPT | Performed by: RADIOLOGY

## 2025-02-07 ENCOUNTER — HOSPITAL ENCOUNTER (OUTPATIENT)
Facility: HOSPITAL | Age: 60
Discharge: HOME | End: 2025-02-07

## 2025-02-07 LAB
RAD ONC ARIA COURSE ID: 1
RAD ONC ARIA COURSE INTENT: NORMAL
RAD ONC ARIA COURSE LAST TREATMENT DATE: NORMAL
RAD ONC ARIA COURSE START DATE: NORMAL
RAD ONC ARIA COURSE TREATMENT ELAPSED DAYS: 18
RAD ONC ARIA FIRST TREATMENT DATE: NORMAL
RAD ONC ARIA PLAN FRACTIONS TREATED TO DATE: 15
RAD ONC ARIA PLAN ID: NORMAL
RAD ONC ARIA PLAN NAME: NORMAL
RAD ONC ARIA PLAN PRESCRIBED DOSE PER FRACTION: 2.67 GY
RAD ONC ARIA PLAN PRIMARY REFERENCE POINT: NORMAL
RAD ONC ARIA PLAN TOTAL FRACTIONS PRESCRIBED: 15
RAD ONC ARIA PLAN TOTAL PRESCRIBED DOSE: 4005 CGY
RAD ONC ARIA REFERENCE POINT DOSAGE GIVEN TO DATE: 40.05 GY
RAD ONC ARIA REFERENCE POINT ID: NORMAL
RAD ONC ARIA REFERENCE POINT SESSION DOSAGE GIVEN: 2.67 GY

## 2025-02-07 PROCEDURE — 77336 RADIATION PHYSICS CONSULT: CPT | Performed by: RADIOLOGY

## 2025-02-07 PROCEDURE — 77412 RADIATION TX DELIVERY LVL 3: CPT | Performed by: RADIOLOGY

## 2025-02-07 PROCEDURE — 77387 GUIDANCE FOR RADJ TX DLVR: CPT | Performed by: RADIOLOGY

## 2025-02-13 ENCOUNTER — OFFICE VISIT (OUTPATIENT)
Dept: OBSTETRICS AND GYNECOLOGY | Facility: CLINIC | Age: 60
End: 2025-02-13
Payer: COMMERCIAL

## 2025-02-13 VITALS
SYSTOLIC BLOOD PRESSURE: 128 MMHG | BODY MASS INDEX: 37.73 KG/M2 | DIASTOLIC BLOOD PRESSURE: 74 MMHG | WEIGHT: 205 LBS | HEIGHT: 62 IN

## 2025-02-13 DIAGNOSIS — Z01.419 WOMEN'S ANNUAL ROUTINE GYNECOLOGICAL EXAMINATION: Primary | ICD-10-CM

## 2025-02-13 RX ORDER — MULTIPLE VITAMINS W/ MINERALS TAB 9MG-400MCG
1 TAB ORAL DAILY
COMMUNITY

## 2025-02-13 NOTE — PROGRESS NOTES
Gynecologic Annual Exam Note        GYN Annual Exam     CC - Here for annual exam.        HPI  Jessika Sousa is a 59 y.o. female, , who presents for annual well woman exam as an established patient.  She is postmenopausal. Denies vaginal bleeding.   Since her last visit the patient was diagnosed with breast cancer on right side, she underwent lumpectomy and radiation, just finished the radiation last week.  Marital Status: .  She is sexually active. She has not had new partners. STD testing recommendations have been explained to the patient and she declines STD testing.    The patient would like to discuss the following complaints today: none    Additional OB/GYN History   On HRT? No    Last Pap : 24. Results: negative. HPV: negative.   Last Completed Pap Smear            PAP SMEAR (Every 3 Years) Next due on 2024  LIQUID-BASED PAP SMEAR WITH HPV GENOTYPING REGARDLESS OF INTERPRETATION (AIDEE,COR,MAD)                  History of abnormal Pap smear: yes -    Family history of uterine, colon, breast, or ovarian cancer: yes -patient, mother and MGM:Breast  Performs monthly Self-Breast Exam: yes  Last mammogram: 10-24-24. There is a copy in the chart. Hx of breast cancer. Goes back to oncologist and breast surgeon in March.     Last Completed Mammogram            MAMMOGRAM (Every 2 Years) Next due on 10/24/2026      10/24/2024  Mammo Diagnostic Digital Tomosynthesis Bilateral With CAD    10/14/2024  Mammo Screening Digital Tomosynthesis Bilateral With CAD    10/12/2023  Mammo Screening Digital Tomosynthesis Bilateral With CAD    2022  Mammo Screening Digital Tomosynthesis Bilateral With CAD    2020  SCANNED - MAMMO    Only the first 5 history entries have been loaded, but more history exists.                  Last colonoscopy: has had a colonoscopy 23  Last Completed Colonoscopy            COLORECTAL CANCER SCREENING (COLONOSCOPY - Every 10 Years)  Tentatively due on 6/13/2033 06/13/2023  SCANNED - COLONOSCOPY    01/29/2020  SCANNED - COLONOSCOPY    10/01/2019  Outside Procedure: COLONOSCOPY                    Her last bone density scan was 8-29-23 and results were Normal  Exercises Regularly: yes  Feelings of Anxiety or Depression: no      Tobacco Usage?: No       Current Outpatient Medications:     atorvastatin (LIPITOR) 40 MG tablet, Take 1 tablet by mouth Daily., Disp: 90 tablet, Rfl: 3    azelastine (ASTELIN) 0.1 % nasal spray, Administer 2 sprays into the nostril(s) as directed by provider 2 (Two) Times a Day. Use in each nostril as directed, Disp: 1 each, Rfl: 1    citalopram (CeleXA) 20 MG tablet, Take 1 tablet by mouth Daily., Disp: 90 tablet, Rfl: 3    fexofenadine-pseudoephedrine (Allegra-D Allergy & Congestion) 180-240 MG per 24 hr tablet, Take 1 tablet by mouth Daily., Disp: 30 tablet, Rfl: 1    multivitamin with minerals tablet tablet, Take 1 tablet by mouth Daily., Disp: , Rfl:     pantoprazole (PROTONIX) 40 MG EC tablet, TAKE 1 TABLET BY MOUTH DAILY, Disp: 90 tablet, Rfl: 0    vitamin B-12 (CYANOCOBALAMIN) 1000 MCG tablet, Take 1 tablet by mouth Daily., Disp: 90 tablet, Rfl: 3    cholestyramine (QUESTRAN) 4 g packet, MIX AND DRINK 1 PACKET BY MOUTH DAILY AS NEEDED FOR DIARRHEA (Patient not taking: Reported on 2/13/2025), Disp: 90 each, Rfl: 0    clindamycin (CLEOCIN T) 1 % lotion, , Disp: , Rfl:     Current Facility-Administered Medications:     cyanocobalamin injection 1,000 mcg, 1,000 mcg, Intramuscular, Q28 Days, Kajal Fairbanks DO, 1,000 mcg at 09/10/20 1041    cyanocobalamin injection 1,000 mcg, 1,000 mcg, Intramuscular, Q28 Days, Kajal Fairbanks, DO, 1,000 mcg at 01/10/20 1242    cyanocobalamin injection 1,000 mcg, 1,000 mcg, Intramuscular, Q28 Days, Kajal Fairbanks DO, 1,000 mcg at 04/21/21 1152    cyanocobalamin injection 1,000 mcg, 1,000 mcg, Intramuscular, Q28 Days, Kajal Fairbanks DO, 1,000 mcg at 11/01/21 1120    Patient denies  the need for medication refills today.    OB History          2    Para   2    Term   2            AB        Living   2         SAB        IAB        Ectopic        Molar        Multiple        Live Births   2                Past Medical History:   Diagnosis Date    Allergic     Hay fever    Breast cancer     Cancer Oct 2024    Breast    Cholelithiasis Juky     Take about having it removed question will it make IBS worse Hida scan was 32    Colon polyp     Diverticulosis     Fibroid 2010    Approximately    GERD (gastroesophageal reflux disease)     Taking medicine for it    Hyperlipidemia     Taking medicine for it    Infectious viral hepatitis ?    Food related one    Irritable bowel syndrome     Varicella Not sure        Past Surgical History:   Procedure Laterality Date    BREAST BIOPSY N/A     side unknown    COLONOSCOPY      ENDOMETRIAL ABLATION          FOOT SURGERY      Bilateral Foot Spur Remove     LAPAROSCOPIC CHOLECYSTECTOMY  2023    UPPER GASTROINTESTINAL ENDOSCOPY      WISDOM TOOTH EXTRACTION         Health Maintenance   Topic Date Due    Pneumococcal Vaccine 50+ (1 of 2 - PCV) Never done    TDAP/TD VACCINES (2 - Td or Tdap) 2018    ZOSTER VACCINE (2 of 2) 2024    Annual Gynecologic Pelvic and Breast Exam  2025    COVID-19 Vaccine ( - - season) 2025 (Originally 2024)    BMI FOLLOWUP  2025    ANNUAL PHYSICAL  10/11/2025    LIPID PANEL  2026    MAMMOGRAM  10/24/2026    PAP SMEAR  2027    COLORECTAL CANCER SCREENING  2033    HEPATITIS C SCREENING  Completed    INFLUENZA VACCINE  Completed       The additional following portions of the patient's history were reviewed and updated as appropriate: allergies, current medications, past family history, past medical history, past social history, past surgical history, and problem list.    Review of Systems   Constitutional: Negative.    HENT: Negative.    "  Eyes: Negative.    Respiratory: Negative.     Cardiovascular: Negative.    Gastrointestinal: Negative.    Endocrine: Negative.    Genitourinary: Negative.    Musculoskeletal: Negative.    Skin: Negative.    Allergic/Immunologic: Negative.    Neurological: Negative.    Hematological: Negative.    Psychiatric/Behavioral: Negative.         I have reviewed and agree with the HPI, ROS, and historical information as entered above. Leah Pedroza, APRN      Objective   /74   Ht 157.5 cm (62.01\")   Wt 93 kg (205 lb)   LMP  (LMP Unknown)   Breastfeeding No   BMI 37.48 kg/m²     Physical Exam  Vitals and nursing note reviewed. Exam conducted with a chaperone present.   Constitutional:       General: She is not in acute distress.     Appearance: Normal appearance. She is well-developed. She is not ill-appearing.   Neck:      Thyroid: No thyroid mass or thyromegaly.   Pulmonary:      Effort: Pulmonary effort is normal. No respiratory distress or retractions.   Chest:      Chest wall: No mass.   Breasts:     Right: Normal. No mass, nipple discharge, skin change or tenderness.      Left: Normal. No mass, nipple discharge, skin change or tenderness.          Comments: Lumpectomy scar right breast. Whole breast slightly pink due to radiation treatment which finished less than a week ago.   Abdominal:      General: There is no distension.      Palpations: Abdomen is soft. Abdomen is not rigid. There is no mass.      Tenderness: There is no abdominal tenderness. There is no guarding or rebound.      Hernia: No hernia is present. There is no hernia in the left inguinal area.   Genitourinary:     General: Normal vulva.      Labia:         Right: No rash, tenderness or lesion.         Left: No rash, tenderness or lesion.       Vagina: Normal. No vaginal discharge or lesions.      Cervix: Normal.      Uterus: Normal. Not enlarged, not fixed and not tender.       Adnexa: Right adnexa normal and left adnexa normal.        Right: " No mass or tenderness.          Left: No mass or tenderness.        Rectum: No external hemorrhoid.   Musculoskeletal:      Cervical back: No muscular tenderness.   Skin:     General: Skin is warm and dry.   Neurological:      Mental Status: She is alert and oriented to person, place, and time.   Psychiatric:         Mood and Affect: Mood normal.         Behavior: Behavior normal.            Assessment and Plan    Problem List Items Addressed This Visit    None  Visit Diagnoses       Women's annual routine gynecological examination    -  Primary            GYN annual well woman exam.   S/p lumpectomy and radiation.  Reviewed monthly self breast exams.  Instructed to call with lumps, pain, or breast discharge.  Yearly mammograms ordered.  Recommended use of Vitamin D and getting adequate calcium in her diet. (1500mg)  RTC in 1 year or PRN with problems.  Return in about 1 year (around 2/13/2026) for Annual physical and DEXA.         Leah Pedroza, APRN  02/13/2025

## 2025-02-21 ENCOUNTER — TELEPHONE (OUTPATIENT)
Age: 60
End: 2025-02-21
Payer: COMMERCIAL

## 2025-02-21 NOTE — TELEPHONE ENCOUNTER
Patient reports she saw dermatologist today and was prescribed Triamcinolone for a rash above the surgical scar on her breast. She inquires if this is okay to use after having radiation treatment. Completed breast radiation (15 fractions on 2/7/25). Per Dr. Frey, this is okay to use on skin. Patient informed and verbalized understanding.

## 2025-03-07 LAB
RAD ONC ARIA COURSE END DATE: NORMAL
RAD ONC ARIA COURSE ID: 1
RAD ONC ARIA COURSE INTENT: NORMAL
RAD ONC ARIA COURSE LAST TREATMENT DATE: NORMAL
RAD ONC ARIA COURSE START DATE: NORMAL
RAD ONC ARIA COURSE TREATMENT ELAPSED DAYS: 18
RAD ONC ARIA FIRST TREATMENT DATE: NORMAL
RAD ONC ARIA PLAN FRACTIONS TREATED TO DATE: 15
RAD ONC ARIA PLAN ID: NORMAL
RAD ONC ARIA PLAN NAME: NORMAL
RAD ONC ARIA PLAN PRESCRIBED DOSE PER FRACTION: 2.67 GY
RAD ONC ARIA PLAN PRIMARY REFERENCE POINT: NORMAL
RAD ONC ARIA PLAN TOTAL FRACTIONS PRESCRIBED: 15
RAD ONC ARIA PLAN TOTAL PRESCRIBED DOSE: 4005 CGY
RAD ONC ARIA REFERENCE POINT DOSAGE GIVEN TO DATE: 40.05 GY
RAD ONC ARIA REFERENCE POINT ID: NORMAL

## 2025-03-11 ENCOUNTER — HOSPITAL ENCOUNTER (OUTPATIENT)
Dept: RADIATION ONCOLOGY | Facility: HOSPITAL | Age: 60
Setting detail: RADIATION/ONCOLOGY SERIES
Discharge: HOME | End: 2025-03-11
Payer: COMMERCIAL

## 2025-03-11 ENCOUNTER — OFFICE VISIT (OUTPATIENT)
Dept: RADIATION ONCOLOGY | Facility: HOSPITAL | Age: 60
End: 2025-03-11
Payer: COMMERCIAL

## 2025-03-11 VITALS
BODY MASS INDEX: 37.98 KG/M2 | SYSTOLIC BLOOD PRESSURE: 146 MMHG | TEMPERATURE: 97.7 F | HEART RATE: 87 BPM | RESPIRATION RATE: 16 BRPM | DIASTOLIC BLOOD PRESSURE: 88 MMHG | OXYGEN SATURATION: 95 % | WEIGHT: 207.7 LBS

## 2025-03-11 DIAGNOSIS — C50.911 DUCTAL CARCINOMA IN SITU (DCIS) OF RIGHT BREAST WITH MICROINVASIVE COMPONENT: Primary | ICD-10-CM

## 2025-03-11 PROCEDURE — G0463 HOSPITAL OUTPT CLINIC VISIT: HCPCS

## 2025-03-11 NOTE — PROGRESS NOTES
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        Chief Complaint:        Chief Complaint   Patient presents with    Breast Cancer       Oncologic History: Jessika Sousa is a 59 y.o. female who is here for initial follow up visit regarding her screening detected DCIS with microinvasion of the right breast. Screening mammography identified an abnormality in the central portion of the right breast which was further characterized on diagnostic mammo/US.   Stereotactic core biopsy - high grade DCIS, ER-/OH-  Bilateral breast MRI - no evidence of multifocal, multicentric, regional, or contralateral disease  Right lumpectomy - high grade DCIS with focal microinvasion, extended margins taken and appropriately negative (greater than 5mm invasive carcinoma, greater than 10 mm DCIS), pT1mi, -/-/-     Status post adjuvant RT directed towards right whole breast (40.05 Gy/15 fr), completed on 2/7/2025.    Interval History:  She is recovering well. Mild fatigue subsiding, she remains active. Gr 1 RT dermatitis improving - she has been using topical triamcinolone cream prescribed by her dermatologist. Occ R sharp breast pains. Denies lymphedema of R breast or RUE or decreased ROM of RUE. No concerns with cosmesis. No SOA, CP, swallowing difficulties.       Prior Radiation:  Adjuvant RT - R whole breast - 40.05 Gy/15 fr  Completion Date:   2/7/2025        Subjective      Review of Systems: Review of Systems   All other systems reviewed and are negative.      The following portions of the patient's history were reviewed and updated as appropriate: allergies, current medications, past  family history, past medical history, past social history, past surgical history and problem list.    Measures:   KPS/Quality of Life: 90 - Limited Activity      Objective     Physical Exam:   Vital Signs:   Vitals:    03/11/25 1500   BP: 146/88   Pulse: 87   Resp: 16   Temp: 97.7 °F (36.5 °C)   TempSrc: Temporal   SpO2: 95%   Weight: 94.2 kg (207 lb 11.2 oz)   PainSc: 0-No pain     Body mass index is 37.98 kg/m².     Constitutional: No acute distress, sitting comfortably  Eye: EOMI, anicteric sclerae  HENT: NC/AT, MMM   Respiratory: Symmetric expansion, nonlabored respiration  MSK: ROM intact in all four extremities, no obvious deformities  Neuro: Alert, oriented x3, CN3-12 grossly intact.   Psych: Appropriate mood and affect.  Right breast and axilla - incisions healed well. Residual hyperpigmentation and faint miliara rubra involving UOQ of R breast and axilla. No palpable masses or nodules. No SCV, IM, or axillary LAD identified.            Assessment / Plan        Assessment/Plan:     Diagnoses and all orders for this visit:    1. 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 female with a right-sided triple negative high grade DCIS with a microinvasive component managed with a right lumpectomy on 12/4/2024. pT1mi. She is s/p adjuvant RT on the right as part of her breast conserving treatment, completing 4 weeks ago. She tolerated treatment well. She developed the anticipated gr 1 fatigue and gr 1 RT dermatitis which are appropriately subsiding.  Clinical exam of the R breast today is benign. We discussed the role of local breast/scar massage to minimize the risk of treatment related fibrosis or lymphedema.  Given ER-negative status of her tumor, she is not receiving adjuvant AI. She will be due for repeat diagnostic mammogram 6 months out from radiation, ~8/2025. She is scheduled for f/u later this week with Dr. Power of  surgical oncology who plans on managing mammogram orders. The patient and I discussed follow-up intervals, including biannual diagnostic mammograms to alternate between the R and bilateral breasts, biannual clinical breast exams, and monthly self breast exams.        Follow Up:   Return in about 1 year (around 3/11/2026) for Office Visit.        Time:   I spent 40 minutes on this encounter today, 03/11/25. Activities that took place during this time include:   - preparing to see the patient  - obtaining and reviewing separately obtained history  - performing a medically appropriate examination and evaluation  - counseling and educating the patient  - ordering medications/tests/procedures  - communicating with other healthcare providers  - documenting clinical information in the health record  - coordinating care for this patient.     Sincerely,    PREETI Crenshaw, DNP, FNP-C  Radiation Oncology  This document has been signed by PREETI Ritchie on March 11, 2025 15:47 EDT           NOTICE TO PATIENTS  At [facility], we believe that sharing information builds trust and better relationships. You are receiving this note because you recently visited [facility]. It is possible you will see health information before a provider has talked with you about it. This kind of information can be easy to misunderstand. To help you fully understand what it means for your health, we urge you to discuss this note with your provider.

## 2025-03-14 ENCOUNTER — TRANSCRIBE ORDERS (OUTPATIENT)
Dept: ADMINISTRATIVE | Facility: HOSPITAL | Age: 60
End: 2025-03-14
Payer: COMMERCIAL

## 2025-03-14 DIAGNOSIS — D05.11 INTRADUCTAL CARCINOMA IN SITU OF RIGHT BREAST: Primary | ICD-10-CM

## 2025-03-25 RX ORDER — ATORVASTATIN CALCIUM 40 MG/1
40 TABLET, FILM COATED ORAL DAILY
Qty: 90 TABLET | Refills: 1 | Status: SHIPPED | OUTPATIENT
Start: 2025-03-25

## 2025-04-18 RX ORDER — PANTOPRAZOLE SODIUM 40 MG/1
40 TABLET, DELAYED RELEASE ORAL DAILY
Qty: 90 TABLET | Refills: 0 | Status: SHIPPED | OUTPATIENT
Start: 2025-04-18

## 2025-05-15 ENCOUNTER — OFFICE VISIT (OUTPATIENT)
Dept: FAMILY MEDICINE CLINIC | Facility: CLINIC | Age: 60
End: 2025-05-15
Payer: COMMERCIAL

## 2025-05-15 VITALS
OXYGEN SATURATION: 95 % | TEMPERATURE: 98.4 F | RESPIRATION RATE: 20 BRPM | SYSTOLIC BLOOD PRESSURE: 128 MMHG | HEART RATE: 67 BPM | HEIGHT: 62 IN | BODY MASS INDEX: 36.88 KG/M2 | WEIGHT: 200.4 LBS | DIASTOLIC BLOOD PRESSURE: 74 MMHG

## 2025-05-15 DIAGNOSIS — M25.362 INSTABILITY OF LEFT KNEE JOINT: ICD-10-CM

## 2025-05-15 DIAGNOSIS — M25.562 ACUTE PAIN OF LEFT KNEE: Primary | ICD-10-CM

## 2025-05-15 RX ORDER — CLINDAMYCIN PHOSPHATE 10 UG/ML
LOTION TOPICAL
COMMUNITY
Start: 2025-02-21

## 2025-05-15 RX ORDER — TRIAMCINOLONE ACETONIDE 1 MG/G
OINTMENT TOPICAL
COMMUNITY
Start: 2025-02-21

## 2025-05-15 NOTE — PROGRESS NOTES
Follow Up Office Visit      Patient Name: Jessika Sousa  : 1965   MRN: [id number]     Chief Complaint:  Knee Pain (Left, x 1 week)     History of Present Illness: Jessika Sousa is a 59 y.o. female who is here today for acute complaint of left knee pain for the past week.      History of Present Illness  The patient is a 59-year-old female who presents for left knee pain.    She reports experiencing discomfort in her left knee, which she attributes to a recent hiking activity. The pain was not immediate but manifested 1 to 2 days post-hike. She describes the sensation as if her patella is misaligned, causing significant pain, particularly when bending the knee in certain ways. Additionally, she experiences a bone-on-bone feeling at the back of her knee. The pain intensifies when she exerts pressure on the knee while attempting to rise from a seated position on her bed. Currently, she reports no pain but expresses concern about the instability of her knee during ambulation. She has been managing the pain with over-the-counter analgesics, specifically Advil.      Subjective     Subjective          The following portions of the patient's history were reviewed and updated as appropriate: allergies, current medications, past family history, past medical history, past social history, past surgical history and problem list.    Allergy:   No Known Allergies     Current Medications:   Current Outpatient Medications   Medication Sig Dispense Refill    atorvastatin (LIPITOR) 40 MG tablet TAKE 1 TABLET BY MOUTH DAILY 90 tablet 1    azelastine (ASTELIN) 0.1 % nasal spray Administer 2 sprays into the nostril(s) as directed by provider 2 (Two) Times a Day. Use in each nostril as directed 1 each 1    citalopram (CeleXA) 20 MG tablet Take 1 tablet by mouth Daily. 90 tablet 3    clindamycin (CLEOCIN T) 1 % lotion APPLY A THIN LAYER TO THE AFFECTED AREA(S) BY TOPICAL ROUTE ONCE A DAY       fexofenadine-pseudoephedrine (Allegra-D Allergy & Congestion) 180-240 MG per 24 hr tablet Take 1 tablet by mouth Daily. 30 tablet 1    multivitamin with minerals tablet tablet Take 1 tablet by mouth Daily.      pantoprazole (PROTONIX) 40 MG EC tablet TAKE 1 TABLET BY MOUTH DAILY 90 tablet 0    triamcinolone (KENALOG) 0.1 % ointment APPLY A THIN LAYER TO THE AFFECTED AREA(S) ON CHEST BY TOPICAL ROUTE 2 TIMES PER DAY FOR UP TO 2 WEEKS      vitamin B-12 (CYANOCOBALAMIN) 1000 MCG tablet Take 1 tablet by mouth Daily. 90 tablet 3     Current Facility-Administered Medications   Medication Dose Route Frequency Provider Last Rate Last Admin    cyanocobalamin injection 1,000 mcg  1,000 mcg Intramuscular Q28 Days Tiki, Kajal A, DO   1,000 mcg at 09/10/20 1041    cyanocobalamin injection 1,000 mcg  1,000 mcg Intramuscular Q28 Days Tiki, Kajal A, DO   1,000 mcg at 01/10/20 1242    cyanocobalamin injection 1,000 mcg  1,000 mcg Intramuscular Q28 Days Fairbanks, Kajal A, DO   1,000 mcg at 04/21/21 1152    cyanocobalamin injection 1,000 mcg  1,000 mcg Intramuscular Q28 Days Fairbanks, Kajal A, DO   1,000 mcg at 11/01/21 1120       Objective     Objective     Physical Exam:  Physical Exam  Vitals and nursing note reviewed.   Constitutional:       Appearance: Normal appearance.   HENT:      Head: Normocephalic and atraumatic.      Mouth/Throat:      Mouth: Mucous membranes are moist.   Eyes:      Pupils: Pupils are equal, round, and reactive to light.   Cardiovascular:      Rate and Rhythm: Normal rate and regular rhythm.      Heart sounds: Normal heart sounds.   Pulmonary:      Effort: Pulmonary effort is normal.      Breath sounds: Normal breath sounds.   Abdominal:      General: Bowel sounds are normal.      Palpations: Abdomen is soft.   Musculoskeletal:      Cervical back: Normal range of motion.      Left knee: Swelling present. Decreased range of motion. Tenderness present over the PCL.   Skin:     General: Skin is warm and dry.  "  Neurological:      General: No focal deficit present.      Mental Status: She is alert and oriented to person, place, and time.   Psychiatric:         Mood and Affect: Mood normal.         Behavior: Behavior normal.         Vital Signs:   /74 (BP Location: Right arm, Patient Position: Sitting, Cuff Size: Adult)   Pulse 67   Temp 98.4 °F (36.9 °C) (Temporal)   Resp 20   Ht 157.5 cm (62.01\")   Wt 90.9 kg (200 lb 6.4 oz)   SpO2 95%   BMI 36.64 kg/m²            PHQ-9 Score  PHQ-9 Total Score:      Lab Review  Orders Only on 03/07/2025   Component Date Value Ref Range Status    Course ID 03/07/2025 1   Final    Course Intent 03/07/2025 Curative   Final    Course Start Date 03/07/2025 1/7/2025  5:15 PM   Final    Course End Date 03/07/2025 3/7/2025  1:50 PM   Final    Course First Treatment Date 03/07/2025 1/20/2025  2:48 PM   Final    Course Last Treatment Date 03/07/2025 2/7/2025  3:15 PM   Final    Course Elapsed Days 03/07/2025 18   Final    Reference Point ID 03/07/2025 Right Breast   Final    Reference Point Dosage Given to Da* 03/07/2025 40.05  Gy Final    Plan ID 03/07/2025 Right Breast   Final    Plan Name 03/07/2025 Right Breast   Final    Plan Fractions Treated to Date 03/07/2025 15   Final    Plan Total Fractions Prescribed 03/07/2025 15   Final    Plan Prescribed Dose Per Fraction 03/07/2025 2.67  Gy Final    Plan Total Prescribed Dose 03/07/2025 4,005  cGy Final    Plan Primary Reference Point 03/07/2025 Right Breast   Final        Radiology Results        Assessment / Plan         Assessment and Plan     Diagnoses and all orders for this visit:    1. Acute pain of left knee (Primary)  Assessment & Plan:  - The patient reports left knee pain that began a day or two after a hike, describing it as feeling like the patella was off and experiencing bone-on-bone pain when bending the knee. The knee feels unstable, especially when walking.  - An MRI of the left knee will be ordered to evaluate the " tendons and other structures.   - She is currently using Advil for pain relief, which is appropriate for both pain and inflammation.  - No steroid treatment is deemed necessary at this time.    Orders:  -     MRI Breast Left Without Contrast; Future    2. Instability of left knee joint  -     MRI Breast Left Without Contrast; Future        Class 2 Severe Obesity (BMI >=35 and <=39.9). Obesity-related health conditions include the following: none. Obesity is improving with treatment. BMI is is above average; BMI management plan is completed. We discussed portion control, increasing exercise, and pharmacologic options including tirzepatide .       Health Maintenance  Health Maintenance:   Health Maintenance Due   Topic Date Due    Pneumococcal Vaccine 50+ (1 of 2 - PCV) Never done    TDAP/TD VACCINES (2 - Td or Tdap) 01/08/2018        Meds ordered during this visit  No orders of the defined types were placed in this encounter.      Meds stopped during this visit:  There are no discontinued medications.     Patient was given instructions and counseling regarding her condition or for health maintenance advice. Please see specific information pulled into the AVS if appropriate.     Follow Up   No follow-ups on file.    Claire Maldonado DO  [facility] [place]     Dictated Utilizing Dragon Dictation: Part of this note may be an electronic transcription/translation of spoken language to printed text using the Dragon Dictation System.    Patient or patient representative verbalized consent for the use of Ambient Listening during the visit with  Claire Maldonado DO for chart documentation. 5/15/2025  11:42 EDT      This document has been electronically signed by Claire Maldonado DO   May 15, 2025 11:42 EDT

## 2025-05-15 NOTE — ASSESSMENT & PLAN NOTE
- The patient reports left knee pain that began a day or two after a hike, describing it as feeling like the patella was off and experiencing bone-on-bone pain when bending the knee. The knee feels unstable, especially when walking.  - An MRI of the left knee will be ordered to evaluate the tendons and other structures.   - She is currently using Advil for pain relief, which is appropriate for both pain and inflammation.  - No steroid treatment is deemed necessary at this time.

## 2025-05-19 ENCOUNTER — OFFICE VISIT (OUTPATIENT)
Dept: FAMILY MEDICINE CLINIC | Facility: CLINIC | Age: 60
End: 2025-05-19
Payer: COMMERCIAL

## 2025-05-19 ENCOUNTER — TELEPHONE (OUTPATIENT)
Dept: URGENT CARE | Facility: CLINIC | Age: 60
End: 2025-05-19
Payer: COMMERCIAL

## 2025-05-19 VITALS
OXYGEN SATURATION: 97 % | HEART RATE: 79 BPM | BODY MASS INDEX: 36.69 KG/M2 | SYSTOLIC BLOOD PRESSURE: 136 MMHG | TEMPERATURE: 98.4 F | WEIGHT: 200.6 LBS | DIASTOLIC BLOOD PRESSURE: 94 MMHG

## 2025-05-19 DIAGNOSIS — R10.32 LLQ PAIN: ICD-10-CM

## 2025-05-19 DIAGNOSIS — N39.0 ACUTE UTI: Primary | ICD-10-CM

## 2025-05-19 DIAGNOSIS — R10.2 SUPRAPUBIC PAIN: Primary | ICD-10-CM

## 2025-05-19 LAB
BILIRUB BLD-MCNC: NEGATIVE MG/DL
CLARITY, POC: CLEAR
COLOR UR: YELLOW
EXPIRATION DATE: NORMAL
GLUCOSE UR STRIP-MCNC: NEGATIVE MG/DL
KETONES UR QL: NEGATIVE
LEUKOCYTE EST, POC: NEGATIVE
Lab: NORMAL
NITRITE UR-MCNC: NEGATIVE MG/ML
PH UR: 6 [PH] (ref 5–8)
PROT UR STRIP-MCNC: NEGATIVE MG/DL
RBC # UR STRIP: NEGATIVE /UL
SP GR UR: 1.02 (ref 1–1.03)
UROBILINOGEN UR QL: NORMAL

## 2025-05-19 PROCEDURE — 99213 OFFICE O/P EST LOW 20 MIN: CPT | Performed by: STUDENT IN AN ORGANIZED HEALTH CARE EDUCATION/TRAINING PROGRAM

## 2025-05-19 PROCEDURE — 81003 URINALYSIS AUTO W/O SCOPE: CPT | Performed by: STUDENT IN AN ORGANIZED HEALTH CARE EDUCATION/TRAINING PROGRAM

## 2025-05-19 PROCEDURE — 87086 URINE CULTURE/COLONY COUNT: CPT | Performed by: STUDENT IN AN ORGANIZED HEALTH CARE EDUCATION/TRAINING PROGRAM

## 2025-05-19 RX ORDER — CEPHALEXIN 500 MG/1
500 CAPSULE ORAL 2 TIMES DAILY
Qty: 14 CAPSULE | Refills: 0 | Status: SHIPPED | OUTPATIENT
Start: 2025-05-19 | End: 2025-05-26

## 2025-05-19 RX ORDER — AMOXICILLIN 500 MG/1
CAPSULE ORAL
Qty: 14 CAPSULE | Refills: 0 | Status: SHIPPED | OUTPATIENT
Start: 2025-05-19 | End: 2025-05-19

## 2025-05-19 NOTE — PROGRESS NOTES
Office Note     Name: Jessika oSusa    : 1965     MRN: [id number]     Chief Complaint  Follow-up (Urinalysis follow up)    Subjective     History of Present Illness:  Jessika Sousa is a 59 y.o. female who presents today for urine follow-up.  Patient was seen at an urgent treatment clinic and was found to have GBS in her urine culture.  She was treated with ciprofloxacin.  She took 2 days of this medication and she continues to have symptoms.  The urgent treatment clinic did send in amoxicillin 500 mg twice daily for her to take this afternoon after her culture was found to positive for GBS, but she has not started taking this yet.  She reports that all of her symptoms started with left-sided lower abdominal pain, into the pelvic area.  She denies any pain or burning with urination.  She ended up going to the urgent treatment clinic on Saturday because of this.  She reports her bowel movements have been normal without any blood.  Denies any fevers.        Objective     Past Medical History:   Diagnosis Date    Allergic     Hay fever    Breast cancer     Cancer Oct 2024    Breast    Cholelithiasis Juky     Take about having it removed question will it make IBS worse Hida scan was 32    Colon polyp     Diverticulitis of colon Colonoscopy 2023    What would cause that to appear?    Diverticulosis     Fatty liver     Fibroid     Approximately    GERD (gastroesophageal reflux disease)     Taking medicine for it    Hernia 2023    Stomach hernia    History of radiation therapy 2025    R whole breast    Hyperlipidemia     Taking medicine for it    Infectious viral hepatitis ?    Food related one    Irritable bowel syndrome     Varicella Not sure     Past Surgical History:   Procedure Laterality Date    BREAST BIOPSY N/A     side unknown    COLONOSCOPY      ENDOMETRIAL ABLATION      2013    FOOT SURGERY      Bilateral Foot Spur Remove     LAPAROSCOPIC  "CHOLECYSTECTOMY  Nov 2023    UPPER GASTROINTESTINAL ENDOSCOPY      WISDOM TOOTH EXTRACTION  1980     Family History   Problem Relation Age of Onset    Colon polyps Father     Ulcers Father     Liver disease Father     Cirrhosis Father     Stroke Mother     Breast cancer Mother         post menopause    Hypertension Mother     Diabetes Brother     Heart disease Brother         Corinary Artery Disease    Stroke Brother     Heart disease Sister     Stroke Sister     Diabetes Sister     Cancer Sister         Skin Cancer (nose)    Pancreatitis Sister     Diabetes Sister     Liver disease Sister     No Known Problems Paternal Grandmother     Breast cancer Maternal Grandmother         Post menopause    Colon polyps Brother         Colon resection    Ovarian cancer Neg Hx     Esophageal cancer Neg Hx     Uterine cancer Neg Hx     Colon cancer Neg Hx        Vital Signs  /94   Pulse 79   Temp 98.4 °F (36.9 °C) (Infrared)   Wt 91 kg (200 lb 9.6 oz)   SpO2 97%   BMI 36.69 kg/m²   Estimated body mass index is 36.69 kg/m² as calculated from the following:    Height as of 5/17/25: 157.5 cm (62\").    Weight as of this encounter: 91 kg (200 lb 9.6 oz).    Physical Exam  Vitals reviewed.   Constitutional:       Appearance: Normal appearance.   Cardiovascular:      Rate and Rhythm: Normal rate.   Pulmonary:      Effort: Pulmonary effort is normal.   Abdominal:      General: Abdomen is flat.      Palpations: Abdomen is soft.      Comments: Tender to palpation in mid pelvis and left portion of pelvis   Neurological:      Mental Status: She is alert.               Assessment and Plan     Diagnoses and all orders for this visit:    1. Suprapubic pain (Primary)  -     POC Urinalysis Dipstick, Automated  -     cephalexin (KEFLEX) 500 MG capsule; Take 1 capsule by mouth 2 (Two) Times a Day for 7 days.  Dispense: 14 capsule; Refill: 0  -     Urine Culture - Urine, Urine, Clean Catch; Future  -     Urine Culture - Urine, Urine, " Clean Catch    2. LLQ pain  -     CT Abdomen Pelvis Without Contrast; Future      I do think patient's symptoms are secondary to urinary tract infection.  Urinalysis today clean but has had positive culture for GBS 2 days ago.  We will not treat with amoxicillin that [facility] sent in given that it is 3 times a day dosing.  Will start Keflex 500 mg twice daily for 7 days.  Obtained repeat urine culture, will follow-up on those results and advise further.  Diverticulitis is also on the differential given that patient has had diverticulosis on previous imaging, she has no changes to her bowel movements however, so less likely etiology.  Advised patient to return to clinic if no improvement or worsening of symptoms.    Addendum: Patient rechecked on 5/22/2025 and reports that her pain has improved but she continues to have pain in the left lower quadrant and pelvic area.  She denies any stool changes but she has had diverticulosis and this is possibly diverticulitis.  I have ordered a CT scan stat for further evaluation.  I will follow-up on those results and advise further.  CT scan is positive for diverticulitis, will switch to Augmentin.  I also gave her ER precautions.      Follow Up  No follow-ups on file.    Gillian Esquivel MD

## 2025-05-21 LAB — BACTERIA SPEC AEROBE CULT: NO GROWTH

## 2025-05-23 ENCOUNTER — HOSPITAL ENCOUNTER (OUTPATIENT)
Dept: CT IMAGING | Facility: HOSPITAL | Age: 60
Discharge: HOME | End: 2025-05-23
Admitting: STUDENT IN AN ORGANIZED HEALTH CARE EDUCATION/TRAINING PROGRAM
Payer: COMMERCIAL

## 2025-05-23 DIAGNOSIS — R10.32 LLQ PAIN: ICD-10-CM

## 2025-05-23 PROCEDURE — 74176 CT ABD & PELVIS W/O CONTRAST: CPT

## 2025-05-27 ENCOUNTER — TELEPHONE (OUTPATIENT)
Dept: FAMILY MEDICINE CLINIC | Facility: CLINIC | Age: 60
End: 2025-05-27
Payer: COMMERCIAL

## 2025-05-27 NOTE — TELEPHONE ENCOUNTER
"    Caller: Jessika Sousa \"Asmita\"    Relationship to patient: Self    Best call back number: [phone]     Chief complaint: ABDOMINAL PAIN LOWER LEFT SIDE     Type of visit: OFFICE     Requested date: ASAP      If rescheduling, when is the original appointment: JUNE 13    Additional notes:WOULD LIKE TO BE SEEN SOONER BY DR NEWMAN     "

## 2025-05-29 ENCOUNTER — OFFICE VISIT (OUTPATIENT)
Dept: FAMILY MEDICINE CLINIC | Facility: CLINIC | Age: 60
End: 2025-05-29
Payer: COMMERCIAL

## 2025-05-29 ENCOUNTER — HOSPITAL ENCOUNTER (OUTPATIENT)
Dept: MAMMOGRAPHY | Facility: HOSPITAL | Age: 60
Discharge: HOME | End: 2025-05-29
Admitting: SURGERY
Payer: COMMERCIAL

## 2025-05-29 VITALS
SYSTOLIC BLOOD PRESSURE: 142 MMHG | DIASTOLIC BLOOD PRESSURE: 78 MMHG | BODY MASS INDEX: 37.73 KG/M2 | HEART RATE: 78 BPM | TEMPERATURE: 98.9 F | HEIGHT: 62 IN | WEIGHT: 205 LBS | RESPIRATION RATE: 20 BRPM

## 2025-05-29 DIAGNOSIS — R10.32 LEFT INGUINAL PAIN: Primary | ICD-10-CM

## 2025-05-29 DIAGNOSIS — D05.11 INTRADUCTAL CARCINOMA IN SITU OF RIGHT BREAST: ICD-10-CM

## 2025-05-29 PROCEDURE — G0279 TOMOSYNTHESIS, MAMMO: HCPCS

## 2025-05-29 PROCEDURE — 77066 DX MAMMO INCL CAD BI: CPT

## 2025-05-29 PROCEDURE — 99214 OFFICE O/P EST MOD 30 MIN: CPT | Performed by: STUDENT IN AN ORGANIZED HEALTH CARE EDUCATION/TRAINING PROGRAM

## 2025-05-29 RX ORDER — METHYLPREDNISOLONE 4 MG/1
TABLET ORAL
Qty: 21 TABLET | Refills: 0 | Status: SHIPPED | OUTPATIENT
Start: 2025-05-29

## 2025-05-29 NOTE — PROGRESS NOTES
"Chief Complaint  Abdominal Pain (Lower abdominal pain left sided 3 weeks. Pt seen dr medrano 5/19/25)    History of Present Illness        Belly pain  She has been having pain for a while now, seen by Dr. Rangel. Her urine was orange. She was treated with antibiotic. Antibiotic changed but she still has pain in her left groin worse with moving the leg. She has had gas more than normal. It is worse with turning. Her bms are normal. No blood in the stool. No fever.  1-10 the pain is 3 with turning but last week is was 6/10. No urinary symptoms today.         The following portions of the patient's history were reviewed and updated as appropriate: allergies, current medications, past family history, past medical history, past social history, past surgical history, and problem list.    OBJECTIVE:  /78   Pulse 78   Temp 98.9 °F (37.2 °C) (Temporal)   Resp 20   Ht 157.5 cm (62.01\")   Wt 93 kg (205 lb)   BMI 37.49 kg/m²       Physical Exam  Constitutional:       General: She is not in acute distress.     Appearance: Normal appearance.   HENT:      Head: Normocephalic and atraumatic.   Eyes:      Extraocular Movements: Extraocular movements intact.   Abdominal:      General: Bowel sounds are normal.      Palpations: Abdomen is soft.      Tenderness: There is no abdominal tenderness.      Comments: Pain in left inguinal ligament present without hernia   Skin:     Findings: No rash.   Neurological:      General: No focal deficit present.      Mental Status: She is alert.   Psychiatric:         Mood and Affect: Mood normal.                    Assessment and Plan   Diagnoses and all orders for this visit:    1. Left inguinal pain (Primary)  -     methylPREDNISolone (MEDROL) 4 MG dose pack; Take as directed on package instructions.  Dispense: 21 tablet; Refill: 0      Likely msk with no fever diarrhea and change with movement. At home pt with steroid. If no improvement consider further imaging and pt. She will " seek care for any diarrhea fever or new symptoms.     No follow-ups on file.       Shirin Dave D.O.  [facility]

## 2025-06-02 ENCOUNTER — TELEPHONE (OUTPATIENT)
Dept: FAMILY MEDICINE CLINIC | Facility: CLINIC | Age: 60
End: 2025-06-02
Payer: COMMERCIAL

## 2025-06-02 NOTE — TELEPHONE ENCOUNTER
Caller: [employer]    Relationship:     Best call back number: [phone]     What orders are you requesting (i.e. lab or imaging): X RAYS IS NEEDED BEFORE INSURANCE WILL COVER AN MRI    In what timeframe would the patient need to come in: ASAP    Where will you receive your lab/imaging services:     Additional notes:

## 2025-06-03 DIAGNOSIS — F33.0 MILD EPISODE OF RECURRENT DEPRESSIVE DISORDER: Chronic | ICD-10-CM

## 2025-06-04 RX ORDER — CITALOPRAM HYDROBROMIDE 20 MG/1
20 TABLET ORAL DAILY
Qty: 90 TABLET | Refills: 0 | Status: SHIPPED | OUTPATIENT
Start: 2025-06-04

## 2025-06-08 ENCOUNTER — HOSPITAL ENCOUNTER (OUTPATIENT)
Facility: HOSPITAL | Age: 60
Discharge: HOME | End: 2025-06-08
Admitting: HOSPITALIST
Payer: COMMERCIAL

## 2025-06-08 DIAGNOSIS — M25.562 ACUTE PAIN OF LEFT KNEE: ICD-10-CM

## 2025-06-08 DIAGNOSIS — M25.362 INSTABILITY OF LEFT KNEE JOINT: ICD-10-CM

## 2025-06-08 PROCEDURE — 73721 MRI JNT OF LWR EXTRE W/O DYE: CPT

## 2025-06-14 DIAGNOSIS — R10.32 LEFT INGUINAL PAIN: Primary | ICD-10-CM

## 2025-06-17 ENCOUNTER — LAB (OUTPATIENT)
Facility: HOSPITAL | Age: 60
End: 2025-06-17
Payer: COMMERCIAL

## 2025-06-17 DIAGNOSIS — R10.32 LEFT INGUINAL PAIN: ICD-10-CM

## 2025-06-17 PROCEDURE — 81001 URINALYSIS AUTO W/SCOPE: CPT

## 2025-06-18 LAB
BACTERIA UR QL AUTO: ABNORMAL /HPF
BILIRUB UR QL STRIP: NEGATIVE
CLARITY UR: CLEAR
COD CRY URNS QL: PRESENT /HPF
COLOR UR: ABNORMAL
GLUCOSE UR STRIP-MCNC: NEGATIVE MG/DL
HGB UR QL STRIP.AUTO: NEGATIVE
HYALINE CASTS UR QL AUTO: ABNORMAL /LPF
KETONES UR QL STRIP: ABNORMAL
LEUKOCYTE ESTERASE UR QL STRIP.AUTO: NEGATIVE
NITRITE UR QL STRIP: NEGATIVE
PH UR STRIP.AUTO: 5.5 [PH] (ref 5–8)
PROT UR QL STRIP: ABNORMAL
RBC # UR STRIP: ABNORMAL /HPF
REF LAB TEST METHOD: ABNORMAL
SP GR UR STRIP: >1.03 (ref 1–1.03)
SQUAMOUS #/AREA URNS HPF: ABNORMAL /HPF
UROBILINOGEN UR QL STRIP: ABNORMAL
WBC # UR STRIP: ABNORMAL /HPF

## 2025-07-29 ENCOUNTER — HOSPITAL ENCOUNTER (OUTPATIENT)
Facility: HOSPITAL | Age: 60
Discharge: HOME | End: 2025-07-29
Admitting: STUDENT IN AN ORGANIZED HEALTH CARE EDUCATION/TRAINING PROGRAM
Payer: COMMERCIAL

## 2025-07-29 DIAGNOSIS — R10.32 LEFT INGUINAL PAIN: ICD-10-CM

## 2025-07-29 PROCEDURE — 76830 TRANSVAGINAL US NON-OB: CPT

## 2025-08-11 RX ORDER — ATORVASTATIN CALCIUM 40 MG/1
40 TABLET, FILM COATED ORAL DAILY
Qty: 90 TABLET | Refills: 0 | Status: SHIPPED | OUTPATIENT
Start: 2025-08-11